# Patient Record
Sex: FEMALE | Race: WHITE | HISPANIC OR LATINO
[De-identification: names, ages, dates, MRNs, and addresses within clinical notes are randomized per-mention and may not be internally consistent; named-entity substitution may affect disease eponyms.]

---

## 2020-11-23 ENCOUNTER — APPOINTMENT (OUTPATIENT)
Dept: NEUROLOGY | Facility: CLINIC | Age: 75
End: 2020-11-23
Payer: MEDICARE

## 2020-11-23 VITALS
WEIGHT: 98 LBS | HEART RATE: 76 BPM | TEMPERATURE: 97.8 F | BODY MASS INDEX: 22.04 KG/M2 | SYSTOLIC BLOOD PRESSURE: 137 MMHG | DIASTOLIC BLOOD PRESSURE: 78 MMHG | HEIGHT: 56 IN

## 2020-11-23 DIAGNOSIS — Z86.79 PERSONAL HISTORY OF OTHER DISEASES OF THE CIRCULATORY SYSTEM: ICD-10-CM

## 2020-11-23 PROCEDURE — 99205 OFFICE O/P NEW HI 60 MIN: CPT

## 2020-11-24 PROBLEM — Z86.79 HISTORY OF ESSENTIAL HYPERTENSION: Status: RESOLVED | Noted: 2020-11-24 | Resolved: 2020-11-24

## 2020-11-24 NOTE — REVIEW OF SYSTEMS
[Confused or Disoriented] : confusion [Memory Lapses or Loss] : memory loss [Decr. Concentrating Ability] : decreased concentrating ability [Difficulty with Language] : ~M difficulty with language [Changed Thought Patterns] : changed thought patterns [Change In Personality] : personality change [Emotional Problems] : emotional problems [Negative] : Heme/Lymph

## 2020-11-24 NOTE — HISTORY OF PRESENT ILLNESS
[FreeTextEntry1] : DANISHA NAVARRETE is a 75 year old woman is being seen as a new patient for memory changes and two episode of unresponsiveness for the past 10 years. Patient is Latvian speaker and is accompanied to the clinic with her son. Per her son her symptoms include very poor short term memory, hallucination including both visual and auditory, restlessness and sometimes agitation. She has poor sleep and has hard time falling sleep. She lives with her niece and her son visits her frequently. In 2014 she was admitted to [facility] for one episode of altered mental status and stiffness. Her son reports that she was sitting on the table when suddenly stopped talking with on LOC. She was noted to have general stiffness.  Her Brain MRI back in 2014 showed microvascular changes but no acute infarct. Carotid Doppler showed bilateral <40 percent stenosis. Per her son VEEG was inconclusive but she was started on seizure medication but she stopped it after two months. She remain symptom free for few years until few years ago she had similar symptoms when she was in [facility]. She then stropped talking and her eyes remained closed for 3-4 hours. She was admitted to hospital and per report her work up was inconclusive.

## 2020-11-24 NOTE — PHYSICAL EXAM
[FreeTextEntry1] : Mental status: Awake, alert and oriented x1.  Poor Recent and remote memory intact.  Naming and repetition are intact. Poor Attention/concentration. No dysarthria, no aphasia. MOCA score is 11/20. She did poorly on visuospatial, naming, recalls, attention and calculation. \par Cranial nerves: Pupils equally round and reactive to light, visual fields full, no nystagmus, extraocular muscles intact, V1 through V3 intact bilaterally and symmetric, face symmetric, hearing intact to finger rub, palate elevation symmetric, tongue was midline.\par Motor:  MRC grading 5/5 b/l UE/LE.   strength 5/5 except the LLE( amputated) \par Sensation: Intact to light touch\par Coordination: No dysmetria on finger-to-nose\par Reflexes: 2+ in bilateral UE/LE. \par Gait:uses the wheelchair. \par \par

## 2020-11-24 NOTE — ASSESSMENT
[FreeTextEntry1] : DANISHA NAVARRETE is a 75 year old woman with history of hypertension, TIA, CVA and CAD is here to establish her care. She has two episodes of staring and unresponsiveness suspected for seizure but her work up been negative in 2014  and last year in [facility]. In addition she has symptoms of dementia including poor memory, insomnia, hallucinations and behavioral changes. Her MOCA score is 11/20.\par \par # AD/Dementia: \par - Start Aricept 5 mg \par - Seroquel 25 mg qhs ( after EKG) \par \par #TIA/CVA \par - Continue ASA and Lipitor  \par \par # History of unresponsiveness \par - Not on AED\par - Continue to monitor

## 2021-01-01 ENCOUNTER — TRANSCRIPTION ENCOUNTER (OUTPATIENT)
Age: 76
End: 2021-01-01

## 2021-01-01 ENCOUNTER — NON-APPOINTMENT (OUTPATIENT)
Age: 76
End: 2021-01-01

## 2021-01-01 ENCOUNTER — APPOINTMENT (OUTPATIENT)
Dept: NEUROLOGY | Facility: CLINIC | Age: 76
End: 2021-01-01
Payer: MEDICARE

## 2021-01-01 ENCOUNTER — APPOINTMENT (OUTPATIENT)
Dept: NEUROLOGY | Facility: CLINIC | Age: 76
End: 2021-01-01

## 2021-01-01 ENCOUNTER — INPATIENT (INPATIENT)
Facility: HOSPITAL | Age: 76
LOS: 3 days | Discharge: HOME | End: 2021-12-26
Attending: INTERNAL MEDICINE | Admitting: INTERNAL MEDICINE
Payer: MEDICARE

## 2021-01-01 ENCOUNTER — INPATIENT (INPATIENT)
Facility: HOSPITAL | Age: 76
LOS: 4 days | Discharge: HOME | End: 2021-06-30
Attending: INTERNAL MEDICINE | Admitting: INTERNAL MEDICINE
Payer: MEDICARE

## 2021-01-01 ENCOUNTER — INPATIENT (INPATIENT)
Facility: HOSPITAL | Age: 76
LOS: 8 days | Discharge: HOME | End: 2021-12-04
Attending: PSYCHIATRY & NEUROLOGY | Admitting: PSYCHIATRY & NEUROLOGY
Payer: MEDICARE

## 2021-01-01 VITALS
OXYGEN SATURATION: 99 % | SYSTOLIC BLOOD PRESSURE: 182 MMHG | DIASTOLIC BLOOD PRESSURE: 79 MMHG | HEART RATE: 55 BPM | RESPIRATION RATE: 18 BRPM | TEMPERATURE: 97 F

## 2021-01-01 VITALS
HEART RATE: 116 BPM | TEMPERATURE: 100 F | SYSTOLIC BLOOD PRESSURE: 107 MMHG | OXYGEN SATURATION: 99 % | RESPIRATION RATE: 18 BRPM | DIASTOLIC BLOOD PRESSURE: 58 MMHG | HEIGHT: 59 IN

## 2021-01-01 VITALS
DIASTOLIC BLOOD PRESSURE: 73 MMHG | HEIGHT: 59 IN | BODY MASS INDEX: 19.76 KG/M2 | WEIGHT: 98 LBS | OXYGEN SATURATION: 98 % | TEMPERATURE: 98.3 F | SYSTOLIC BLOOD PRESSURE: 130 MMHG | HEART RATE: 65 BPM

## 2021-01-01 VITALS
DIASTOLIC BLOOD PRESSURE: 64 MMHG | TEMPERATURE: 96 F | SYSTOLIC BLOOD PRESSURE: 133 MMHG | RESPIRATION RATE: 18 BRPM | HEART RATE: 86 BPM

## 2021-01-01 VITALS
RESPIRATION RATE: 18 BRPM | WEIGHT: 101.41 LBS | DIASTOLIC BLOOD PRESSURE: 94 MMHG | SYSTOLIC BLOOD PRESSURE: 168 MMHG | TEMPERATURE: 98 F

## 2021-01-01 VITALS
SYSTOLIC BLOOD PRESSURE: 132 MMHG | TEMPERATURE: 98 F | DIASTOLIC BLOOD PRESSURE: 70 MMHG | HEART RATE: 97 BPM | RESPIRATION RATE: 18 BRPM

## 2021-01-01 VITALS
TEMPERATURE: 98 F | DIASTOLIC BLOOD PRESSURE: 58 MMHG | RESPIRATION RATE: 18 BRPM | SYSTOLIC BLOOD PRESSURE: 147 MMHG | HEART RATE: 95 BPM

## 2021-01-01 DIAGNOSIS — Z89.612 ACQUIRED ABSENCE OF LEFT LEG ABOVE KNEE: Chronic | ICD-10-CM

## 2021-01-01 DIAGNOSIS — R13.10 DYSPHAGIA, UNSPECIFIED: ICD-10-CM

## 2021-01-01 DIAGNOSIS — G40.909 EPILEPSY, UNSPECIFIED, NOT INTRACTABLE, WITHOUT STATUS EPILEPTICUS: ICD-10-CM

## 2021-01-01 DIAGNOSIS — R74.8 ABNORMAL LEVELS OF OTHER SERUM ENZYMES: ICD-10-CM

## 2021-01-01 DIAGNOSIS — N39.0 URINARY TRACT INFECTION, SITE NOT SPECIFIED: ICD-10-CM

## 2021-01-01 DIAGNOSIS — Z79.2 LONG TERM (CURRENT) USE OF ANTIBIOTICS: ICD-10-CM

## 2021-01-01 DIAGNOSIS — R51.9 HEADACHE, UNSPECIFIED: ICD-10-CM

## 2021-01-01 DIAGNOSIS — R33.9 RETENTION OF URINE, UNSPECIFIED: ICD-10-CM

## 2021-01-01 DIAGNOSIS — J96.00 ACUTE RESPIRATORY FAILURE, UNSPECIFIED WHETHER WITH HYPOXIA OR HYPERCAPNIA: ICD-10-CM

## 2021-01-01 DIAGNOSIS — E53.8 DEFICIENCY OF OTHER SPECIFIED B GROUP VITAMINS: ICD-10-CM

## 2021-01-01 DIAGNOSIS — R41.82 ALTERED MENTAL STATUS, UNSPECIFIED: ICD-10-CM

## 2021-01-01 DIAGNOSIS — R41.9 UNSPECIFIED SYMPTOMS AND SIGNS INVOLVING COGNITIVE FUNCTIONS AND AWARENESS: ICD-10-CM

## 2021-01-01 DIAGNOSIS — F02.81 DEMENTIA IN OTHER DISEASES CLASSIFIED ELSEWHERE, UNSPECIFIED SEVERITY, WITH BEHAVIORAL DISTURBANCE: ICD-10-CM

## 2021-01-01 DIAGNOSIS — Z89.612 ACQUIRED ABSENCE OF LEFT LEG ABOVE KNEE: ICD-10-CM

## 2021-01-01 DIAGNOSIS — Z87.891 PERSONAL HISTORY OF NICOTINE DEPENDENCE: ICD-10-CM

## 2021-01-01 DIAGNOSIS — G93.41 METABOLIC ENCEPHALOPATHY: ICD-10-CM

## 2021-01-01 DIAGNOSIS — F03.90 UNSPECIFIED DEMENTIA WITHOUT BEHAVIORAL DISTURBANCE: ICD-10-CM

## 2021-01-01 DIAGNOSIS — Z89.522 ACQUIRED ABSENCE OF LEFT KNEE: ICD-10-CM

## 2021-01-01 DIAGNOSIS — I10 ESSENTIAL (PRIMARY) HYPERTENSION: ICD-10-CM

## 2021-01-01 DIAGNOSIS — Z90.5 ACQUIRED ABSENCE OF KIDNEY: Chronic | ICD-10-CM

## 2021-01-01 DIAGNOSIS — I69.392 FACIAL WEAKNESS FOLLOWING CEREBRAL INFARCTION: ICD-10-CM

## 2021-01-01 DIAGNOSIS — G81.94 HEMIPLEGIA, UNSPECIFIED AFFECTING LEFT NONDOMINANT SIDE: ICD-10-CM

## 2021-01-01 DIAGNOSIS — K59.00 CONSTIPATION, UNSPECIFIED: ICD-10-CM

## 2021-01-01 DIAGNOSIS — G30.9 ALZHEIMER'S DISEASE, UNSPECIFIED: ICD-10-CM

## 2021-01-01 DIAGNOSIS — Z91.14 PATIENT'S OTHER NONCOMPLIANCE WITH MEDICATION REGIMEN: ICD-10-CM

## 2021-01-01 DIAGNOSIS — R00.0 TACHYCARDIA, UNSPECIFIED: ICD-10-CM

## 2021-01-01 DIAGNOSIS — R29.734 NIHSS SCORE 34: ICD-10-CM

## 2021-01-01 DIAGNOSIS — E78.5 HYPERLIPIDEMIA, UNSPECIFIED: ICD-10-CM

## 2021-01-01 DIAGNOSIS — Z96.652 PRESENCE OF LEFT ARTIFICIAL KNEE JOINT: ICD-10-CM

## 2021-01-01 DIAGNOSIS — R41.0 DISORIENTATION, UNSPECIFIED: ICD-10-CM

## 2021-01-01 DIAGNOSIS — E83.52 HYPERCALCEMIA: ICD-10-CM

## 2021-01-01 DIAGNOSIS — Z90.5 ACQUIRED ABSENCE OF KIDNEY: ICD-10-CM

## 2021-01-01 DIAGNOSIS — Z86.73 PERSONAL HISTORY OF TRANSIENT ISCHEMIC ATTACK (TIA), AND CEREBRAL INFARCTION W/OUT RESIDUAL DEFICITS: ICD-10-CM

## 2021-01-01 DIAGNOSIS — R00.1 BRADYCARDIA, UNSPECIFIED: ICD-10-CM

## 2021-01-01 DIAGNOSIS — R29.6 REPEATED FALLS: ICD-10-CM

## 2021-01-01 LAB
25(OH)D3 SERPL-MCNC: 17 NG/ML
A1C WITH ESTIMATED AVERAGE GLUCOSE RESULT: 5.7 % — HIGH (ref 4–5.6)
ALBUMIN SERPL ELPH-MCNC: 3.2 G/DL — LOW (ref 3.5–5.2)
ALBUMIN SERPL ELPH-MCNC: 3.3 G/DL — LOW (ref 3.5–5.2)
ALBUMIN SERPL ELPH-MCNC: 3.3 G/DL — LOW (ref 3.5–5.2)
ALBUMIN SERPL ELPH-MCNC: 3.4 G/DL — LOW (ref 3.5–5.2)
ALBUMIN SERPL ELPH-MCNC: 3.5 G/DL — SIGNIFICANT CHANGE UP (ref 3.5–5.2)
ALBUMIN SERPL ELPH-MCNC: 3.5 G/DL — SIGNIFICANT CHANGE UP (ref 3.5–5.2)
ALBUMIN SERPL ELPH-MCNC: 3.7 G/DL — SIGNIFICANT CHANGE UP (ref 3.5–5.2)
ALBUMIN SERPL ELPH-MCNC: 3.8 G/DL — SIGNIFICANT CHANGE UP (ref 3.5–5.2)
ALBUMIN SERPL ELPH-MCNC: 3.9 G/DL — SIGNIFICANT CHANGE UP (ref 3.5–5.2)
ALBUMIN SERPL ELPH-MCNC: 4 G/DL — SIGNIFICANT CHANGE UP (ref 3.5–5.2)
ALBUMIN SERPL ELPH-MCNC: 4 G/DL — SIGNIFICANT CHANGE UP (ref 3.5–5.2)
ALBUMIN SERPL ELPH-MCNC: 4.1 G/DL — SIGNIFICANT CHANGE UP (ref 3.5–5.2)
ALBUMIN SERPL ELPH-MCNC: 4.4 G/DL
ALBUMIN SERPL ELPH-MCNC: 4.5 G/DL — SIGNIFICANT CHANGE UP (ref 3.5–5.2)
ALP BLD-CCNC: 174 U/L
ALP SERPL-CCNC: 104 U/L — SIGNIFICANT CHANGE UP (ref 30–115)
ALP SERPL-CCNC: 113 U/L — SIGNIFICANT CHANGE UP (ref 30–115)
ALP SERPL-CCNC: 119 U/L — HIGH (ref 30–115)
ALP SERPL-CCNC: 123 U/L — HIGH (ref 30–115)
ALP SERPL-CCNC: 124 U/L — HIGH (ref 30–115)
ALP SERPL-CCNC: 126 U/L — HIGH (ref 30–115)
ALP SERPL-CCNC: 134 U/L — HIGH (ref 30–115)
ALP SERPL-CCNC: 135 U/L — HIGH (ref 30–115)
ALP SERPL-CCNC: 150 U/L — HIGH (ref 30–115)
ALP SERPL-CCNC: 157 U/L — HIGH (ref 30–115)
ALP SERPL-CCNC: 161 U/L — HIGH (ref 30–115)
ALP SERPL-CCNC: 162 U/L — HIGH (ref 30–115)
ALP SERPL-CCNC: 169 U/L — HIGH (ref 30–115)
ALP SERPL-CCNC: 174 U/L — HIGH (ref 30–115)
ALP SERPL-CCNC: 183 U/L — HIGH (ref 30–115)
ALP SERPL-CCNC: 202 U/L — HIGH (ref 30–115)
ALP SERPL-CCNC: 205 U/L — HIGH (ref 30–115)
ALP SERPL-CCNC: 208 U/L — HIGH (ref 30–115)
ALP SERPL-CCNC: 97 U/L — SIGNIFICANT CHANGE UP (ref 30–115)
ALT FLD-CCNC: 10 U/L — SIGNIFICANT CHANGE UP (ref 0–41)
ALT FLD-CCNC: 11 U/L — SIGNIFICANT CHANGE UP (ref 0–41)
ALT FLD-CCNC: 11 U/L — SIGNIFICANT CHANGE UP (ref 0–41)
ALT FLD-CCNC: 14 U/L — SIGNIFICANT CHANGE UP (ref 0–41)
ALT FLD-CCNC: 14 U/L — SIGNIFICANT CHANGE UP (ref 0–41)
ALT FLD-CCNC: 187 U/L — HIGH (ref 0–41)
ALT FLD-CCNC: 19 U/L — SIGNIFICANT CHANGE UP (ref 0–41)
ALT FLD-CCNC: 20 U/L — SIGNIFICANT CHANGE UP (ref 0–41)
ALT FLD-CCNC: 229 U/L — HIGH (ref 0–41)
ALT FLD-CCNC: 27 U/L — SIGNIFICANT CHANGE UP (ref 0–41)
ALT FLD-CCNC: 34 U/L — SIGNIFICANT CHANGE UP (ref 0–41)
ALT FLD-CCNC: 45 U/L — HIGH (ref 0–41)
ALT FLD-CCNC: 6 U/L — SIGNIFICANT CHANGE UP (ref 0–41)
ALT FLD-CCNC: 64 U/L — HIGH (ref 0–41)
ALT FLD-CCNC: 8 U/L — SIGNIFICANT CHANGE UP (ref 0–41)
ALT FLD-CCNC: 9 U/L — SIGNIFICANT CHANGE UP (ref 0–41)
ALT FLD-CCNC: 9 U/L — SIGNIFICANT CHANGE UP (ref 0–41)
ALT SERPL-CCNC: 14 U/L
AMMONIA BLD-MCNC: 30 UMOL/L — SIGNIFICANT CHANGE UP (ref 11–55)
AMMONIA PLAS-MCNC: 18 UMOL/L
ANION GAP SERPL CALC-SCNC: 10 MMOL/L — SIGNIFICANT CHANGE UP (ref 7–14)
ANION GAP SERPL CALC-SCNC: 10 MMOL/L — SIGNIFICANT CHANGE UP (ref 7–14)
ANION GAP SERPL CALC-SCNC: 11 MMOL/L — SIGNIFICANT CHANGE UP (ref 7–14)
ANION GAP SERPL CALC-SCNC: 11 MMOL/L — SIGNIFICANT CHANGE UP (ref 7–14)
ANION GAP SERPL CALC-SCNC: 12 MMOL/L — SIGNIFICANT CHANGE UP (ref 7–14)
ANION GAP SERPL CALC-SCNC: 13 MMOL/L — SIGNIFICANT CHANGE UP (ref 7–14)
ANION GAP SERPL CALC-SCNC: 13 MMOL/L — SIGNIFICANT CHANGE UP (ref 7–14)
ANION GAP SERPL CALC-SCNC: 14 MMOL/L — SIGNIFICANT CHANGE UP (ref 7–14)
ANION GAP SERPL CALC-SCNC: 15 MMOL/L — HIGH (ref 7–14)
ANION GAP SERPL CALC-SCNC: 16 MMOL/L — HIGH (ref 7–14)
ANION GAP SERPL CALC-SCNC: 16 MMOL/L — HIGH (ref 7–14)
ANION GAP SERPL CALC-SCNC: 17 MMOL/L — HIGH (ref 7–14)
ANION GAP SERPL CALC-SCNC: 8 MMOL/L — SIGNIFICANT CHANGE UP (ref 7–14)
ANION GAP SERPL CALC-SCNC: 8 MMOL/L — SIGNIFICANT CHANGE UP (ref 7–14)
ANION GAP SERPL CALC-SCNC: 9 MMOL/L
APPEARANCE UR: ABNORMAL
APPEARANCE UR: ABNORMAL
APPEARANCE UR: CLEAR — SIGNIFICANT CHANGE UP
APTT BLD: 36.4 SEC — SIGNIFICANT CHANGE UP (ref 27–39.2)
APTT BLD: 54.4 SEC — HIGH (ref 27–39.2)
AST SERPL-CCNC: 10 U/L — SIGNIFICANT CHANGE UP (ref 0–41)
AST SERPL-CCNC: 11 U/L — SIGNIFICANT CHANGE UP (ref 0–41)
AST SERPL-CCNC: 14 U/L
AST SERPL-CCNC: 14 U/L — SIGNIFICANT CHANGE UP (ref 0–41)
AST SERPL-CCNC: 15 U/L — SIGNIFICANT CHANGE UP (ref 0–41)
AST SERPL-CCNC: 15 U/L — SIGNIFICANT CHANGE UP (ref 0–41)
AST SERPL-CCNC: 16 U/L — SIGNIFICANT CHANGE UP (ref 0–41)
AST SERPL-CCNC: 21 U/L — SIGNIFICANT CHANGE UP (ref 0–41)
AST SERPL-CCNC: 210 U/L — HIGH (ref 0–41)
AST SERPL-CCNC: 22 U/L — SIGNIFICANT CHANGE UP (ref 0–41)
AST SERPL-CCNC: 29 U/L — SIGNIFICANT CHANGE UP (ref 0–41)
AST SERPL-CCNC: 405 U/L — HIGH (ref 0–41)
BACTERIA # UR AUTO: ABNORMAL
BASE EXCESS BLDA CALC-SCNC: -3.8 MMOL/L — LOW (ref -2–3)
BASOPHILS # BLD AUTO: 0.02 K/UL — SIGNIFICANT CHANGE UP (ref 0–0.2)
BASOPHILS # BLD AUTO: 0.03 K/UL — SIGNIFICANT CHANGE UP (ref 0–0.2)
BASOPHILS # BLD AUTO: 0.04 K/UL — SIGNIFICANT CHANGE UP (ref 0–0.2)
BASOPHILS # BLD AUTO: 0.05 K/UL
BASOPHILS # BLD AUTO: 0.05 K/UL — SIGNIFICANT CHANGE UP (ref 0–0.2)
BASOPHILS # BLD AUTO: 0.05 K/UL — SIGNIFICANT CHANGE UP (ref 0–0.2)
BASOPHILS NFR BLD AUTO: 0.2 % — SIGNIFICANT CHANGE UP (ref 0–1)
BASOPHILS NFR BLD AUTO: 0.3 % — SIGNIFICANT CHANGE UP (ref 0–1)
BASOPHILS NFR BLD AUTO: 0.3 % — SIGNIFICANT CHANGE UP (ref 0–1)
BASOPHILS NFR BLD AUTO: 0.4 % — SIGNIFICANT CHANGE UP (ref 0–1)
BASOPHILS NFR BLD AUTO: 0.5 % — SIGNIFICANT CHANGE UP (ref 0–1)
BASOPHILS NFR BLD AUTO: 0.6 % — SIGNIFICANT CHANGE UP (ref 0–1)
BASOPHILS NFR BLD AUTO: 0.6 % — SIGNIFICANT CHANGE UP (ref 0–1)
BASOPHILS NFR BLD AUTO: 0.7 % — SIGNIFICANT CHANGE UP (ref 0–1)
BASOPHILS NFR BLD AUTO: 0.7 % — SIGNIFICANT CHANGE UP (ref 0–1)
BASOPHILS NFR BLD AUTO: 0.8 %
BILIRUB SERPL-MCNC: 0.2 MG/DL — SIGNIFICANT CHANGE UP (ref 0.2–1.2)
BILIRUB SERPL-MCNC: 0.3 MG/DL — SIGNIFICANT CHANGE UP (ref 0.2–1.2)
BILIRUB SERPL-MCNC: 0.4 MG/DL
BILIRUB SERPL-MCNC: 0.4 MG/DL — SIGNIFICANT CHANGE UP (ref 0.2–1.2)
BILIRUB SERPL-MCNC: 0.4 MG/DL — SIGNIFICANT CHANGE UP (ref 0.2–1.2)
BILIRUB SERPL-MCNC: 0.5 MG/DL — SIGNIFICANT CHANGE UP (ref 0.2–1.2)
BILIRUB SERPL-MCNC: 0.6 MG/DL — SIGNIFICANT CHANGE UP (ref 0.2–1.2)
BILIRUB SERPL-MCNC: 0.7 MG/DL — SIGNIFICANT CHANGE UP (ref 0.2–1.2)
BILIRUB SERPL-MCNC: 0.8 MG/DL — SIGNIFICANT CHANGE UP (ref 0.2–1.2)
BILIRUB SERPL-MCNC: 0.8 MG/DL — SIGNIFICANT CHANGE UP (ref 0.2–1.2)
BILIRUB UR-MCNC: NEGATIVE — SIGNIFICANT CHANGE UP
BUN SERPL-MCNC: 10 MG/DL — SIGNIFICANT CHANGE UP (ref 10–20)
BUN SERPL-MCNC: 11 MG/DL — SIGNIFICANT CHANGE UP (ref 10–20)
BUN SERPL-MCNC: 12 MG/DL — SIGNIFICANT CHANGE UP (ref 10–20)
BUN SERPL-MCNC: 13 MG/DL — SIGNIFICANT CHANGE UP (ref 10–20)
BUN SERPL-MCNC: 13 MG/DL — SIGNIFICANT CHANGE UP (ref 10–20)
BUN SERPL-MCNC: 14 MG/DL — SIGNIFICANT CHANGE UP (ref 10–20)
BUN SERPL-MCNC: 15 MG/DL — SIGNIFICANT CHANGE UP (ref 10–20)
BUN SERPL-MCNC: 16 MG/DL
BUN SERPL-MCNC: 16 MG/DL — SIGNIFICANT CHANGE UP (ref 10–20)
BUN SERPL-MCNC: 18 MG/DL — SIGNIFICANT CHANGE UP (ref 10–20)
BUN SERPL-MCNC: 20 MG/DL — SIGNIFICANT CHANGE UP (ref 10–20)
C DIFF BY PCR RESULT: POSITIVE
C DIFF TOX GENS STL QL NAA+PROBE: SIGNIFICANT CHANGE UP
CA-I BLD-SCNC: 1.3 MMOL/L — SIGNIFICANT CHANGE UP (ref 1.15–1.33)
CA-I BLD-SCNC: 1.4 MMOL/L — HIGH (ref 1.12–1.3)
CALCIUM SERPL-MCNC: 10 MG/DL — SIGNIFICANT CHANGE UP (ref 8.5–10.1)
CALCIUM SERPL-MCNC: 10.1 MG/DL — SIGNIFICANT CHANGE UP (ref 8.5–10.1)
CALCIUM SERPL-MCNC: 10.1 MG/DL — SIGNIFICANT CHANGE UP (ref 8.5–10.1)
CALCIUM SERPL-MCNC: 10.2 MG/DL — HIGH (ref 8.5–10.1)
CALCIUM SERPL-MCNC: 10.3 MG/DL — HIGH (ref 8.5–10.1)
CALCIUM SERPL-MCNC: 10.4 MG/DL — HIGH (ref 8.5–10.1)
CALCIUM SERPL-MCNC: 10.4 MG/DL — HIGH (ref 8.5–10.1)
CALCIUM SERPL-MCNC: 10.4 MG/DL — SIGNIFICANT CHANGE UP (ref 8.4–10.5)
CALCIUM SERPL-MCNC: 10.5 MG/DL — HIGH (ref 8.5–10.1)
CALCIUM SERPL-MCNC: 10.6 MG/DL — HIGH (ref 8.5–10.1)
CALCIUM SERPL-MCNC: 10.9 MG/DL
CALCIUM SERPL-MCNC: 9.2 MG/DL — SIGNIFICANT CHANGE UP (ref 8.5–10.1)
CALCIUM SERPL-MCNC: 9.3 MG/DL — SIGNIFICANT CHANGE UP (ref 8.5–10.1)
CALCIUM SERPL-MCNC: 9.3 MG/DL — SIGNIFICANT CHANGE UP (ref 8.5–10.1)
CALCIUM SERPL-MCNC: 9.4 MG/DL — SIGNIFICANT CHANGE UP (ref 8.5–10.1)
CALCIUM SERPL-MCNC: 9.5 MG/DL — SIGNIFICANT CHANGE UP (ref 8.5–10.1)
CALCIUM SERPL-MCNC: 9.5 MG/DL — SIGNIFICANT CHANGE UP (ref 8.5–10.1)
CALCIUM SERPL-MCNC: 9.6 MG/DL — SIGNIFICANT CHANGE UP (ref 8.5–10.1)
CALCIUM SERPL-MCNC: 9.7 MG/DL — SIGNIFICANT CHANGE UP (ref 8.5–10.1)
CALCIUM SERPL-MCNC: 9.7 MG/DL — SIGNIFICANT CHANGE UP (ref 8.5–10.1)
CALCIUM SERPL-MCNC: 9.8 MG/DL — SIGNIFICANT CHANGE UP (ref 8.5–10.1)
CALCIUM SERPL-MCNC: 9.8 MG/DL — SIGNIFICANT CHANGE UP (ref 8.5–10.1)
CALCIUM SERPL-MCNC: 9.9 MG/DL — SIGNIFICANT CHANGE UP (ref 8.5–10.1)
CHLORIDE SERPL-SCNC: 105 MMOL/L — SIGNIFICANT CHANGE UP (ref 98–110)
CHLORIDE SERPL-SCNC: 106 MMOL/L — SIGNIFICANT CHANGE UP (ref 98–110)
CHLORIDE SERPL-SCNC: 106 MMOL/L — SIGNIFICANT CHANGE UP (ref 98–110)
CHLORIDE SERPL-SCNC: 107 MMOL/L — SIGNIFICANT CHANGE UP (ref 98–110)
CHLORIDE SERPL-SCNC: 108 MMOL/L — SIGNIFICANT CHANGE UP (ref 98–110)
CHLORIDE SERPL-SCNC: 109 MMOL/L — SIGNIFICANT CHANGE UP (ref 98–110)
CHLORIDE SERPL-SCNC: 110 MMOL/L — SIGNIFICANT CHANGE UP (ref 98–110)
CHLORIDE SERPL-SCNC: 110 MMOL/L — SIGNIFICANT CHANGE UP (ref 98–110)
CHLORIDE SERPL-SCNC: 111 MMOL/L
CHLORIDE SERPL-SCNC: 111 MMOL/L — HIGH (ref 98–110)
CHLORIDE SERPL-SCNC: 112 MMOL/L — HIGH (ref 98–110)
CHLORIDE SERPL-SCNC: 112 MMOL/L — HIGH (ref 98–110)
CHLORIDE SERPL-SCNC: 113 MMOL/L — HIGH (ref 98–110)
CHLORIDE SERPL-SCNC: 113 MMOL/L — HIGH (ref 98–110)
CHOLEST SERPL-MCNC: 104 MG/DL — SIGNIFICANT CHANGE UP
CHOLEST SERPL-MCNC: 108 MG/DL
CHOLEST SERPL-MCNC: 111 MG/DL — SIGNIFICANT CHANGE UP
CO2 SERPL-SCNC: 13 MMOL/L — LOW (ref 17–32)
CO2 SERPL-SCNC: 15 MMOL/L — LOW (ref 17–32)
CO2 SERPL-SCNC: 16 MMOL/L — LOW (ref 17–32)
CO2 SERPL-SCNC: 17 MMOL/L — SIGNIFICANT CHANGE UP (ref 17–32)
CO2 SERPL-SCNC: 18 MMOL/L — SIGNIFICANT CHANGE UP (ref 17–32)
CO2 SERPL-SCNC: 18 MMOL/L — SIGNIFICANT CHANGE UP (ref 17–32)
CO2 SERPL-SCNC: 19 MMOL/L — SIGNIFICANT CHANGE UP (ref 17–32)
CO2 SERPL-SCNC: 20 MMOL/L — SIGNIFICANT CHANGE UP (ref 17–32)
CO2 SERPL-SCNC: 21 MMOL/L — SIGNIFICANT CHANGE UP (ref 17–32)
CO2 SERPL-SCNC: 22 MMOL/L — SIGNIFICANT CHANGE UP (ref 17–32)
CO2 SERPL-SCNC: 22 MMOL/L — SIGNIFICANT CHANGE UP (ref 17–32)
CO2 SERPL-SCNC: 23 MMOL/L — SIGNIFICANT CHANGE UP (ref 17–32)
CO2 SERPL-SCNC: 24 MMOL/L — SIGNIFICANT CHANGE UP (ref 17–32)
CO2 SERPL-SCNC: 25 MMOL/L — SIGNIFICANT CHANGE UP (ref 17–32)
CO2 SERPL-SCNC: 25 MMOL/L — SIGNIFICANT CHANGE UP (ref 17–32)
CO2 SERPL-SCNC: 26 MMOL/L
COLOR SPEC: YELLOW — SIGNIFICANT CHANGE UP
COVID-19 NUCLEOCAPSID GAM AB INTERP: NEGATIVE — SIGNIFICANT CHANGE UP
COVID-19 NUCLEOCAPSID TOTAL GAM ANTIBODY RESULT: 0.08 INDEX — SIGNIFICANT CHANGE UP
COVID-19 SPIKE DOMAIN AB INTERP: NEGATIVE — SIGNIFICANT CHANGE UP
COVID-19 SPIKE DOMAIN AB INTERP: POSITIVE
COVID-19 SPIKE DOMAIN ANTIBODY RESULT: 0.4 U/ML — SIGNIFICANT CHANGE UP
COVID-19 SPIKE DOMAIN ANTIBODY RESULT: >250 U/ML — HIGH
CREAT SERPL-MCNC: 0.7 MG/DL — SIGNIFICANT CHANGE UP (ref 0.7–1.5)
CREAT SERPL-MCNC: 0.8 MG/DL — SIGNIFICANT CHANGE UP (ref 0.7–1.5)
CREAT SERPL-MCNC: 0.9 MG/DL
CREAT SERPL-MCNC: 0.9 MG/DL — SIGNIFICANT CHANGE UP (ref 0.7–1.5)
CREAT SERPL-MCNC: 1.1 MG/DL — SIGNIFICANT CHANGE UP (ref 0.7–1.5)
CULTURE RESULTS: SIGNIFICANT CHANGE UP
DIFF PNL FLD: NEGATIVE — SIGNIFICANT CHANGE UP
DIFF PNL FLD: NEGATIVE — SIGNIFICANT CHANGE UP
DIFF PNL FLD: SIGNIFICANT CHANGE UP
EOSINOPHIL # BLD AUTO: 0 K/UL — SIGNIFICANT CHANGE UP (ref 0–0.7)
EOSINOPHIL # BLD AUTO: 0.01 K/UL — SIGNIFICANT CHANGE UP (ref 0–0.7)
EOSINOPHIL # BLD AUTO: 0.02 K/UL — SIGNIFICANT CHANGE UP (ref 0–0.7)
EOSINOPHIL # BLD AUTO: 0.02 K/UL — SIGNIFICANT CHANGE UP (ref 0–0.7)
EOSINOPHIL # BLD AUTO: 0.04 K/UL — SIGNIFICANT CHANGE UP (ref 0–0.7)
EOSINOPHIL # BLD AUTO: 0.05 K/UL — SIGNIFICANT CHANGE UP (ref 0–0.7)
EOSINOPHIL # BLD AUTO: 0.1 K/UL — SIGNIFICANT CHANGE UP (ref 0–0.7)
EOSINOPHIL # BLD AUTO: 0.13 K/UL — SIGNIFICANT CHANGE UP (ref 0–0.7)
EOSINOPHIL # BLD AUTO: 0.14 K/UL — SIGNIFICANT CHANGE UP (ref 0–0.7)
EOSINOPHIL # BLD AUTO: 0.15 K/UL — SIGNIFICANT CHANGE UP (ref 0–0.7)
EOSINOPHIL # BLD AUTO: 0.15 K/UL — SIGNIFICANT CHANGE UP (ref 0–0.7)
EOSINOPHIL # BLD AUTO: 0.16 K/UL — SIGNIFICANT CHANGE UP (ref 0–0.7)
EOSINOPHIL # BLD AUTO: 0.22 K/UL
EOSINOPHIL # BLD AUTO: 0.28 K/UL — SIGNIFICANT CHANGE UP (ref 0–0.7)
EOSINOPHIL # BLD AUTO: 0.38 K/UL — SIGNIFICANT CHANGE UP (ref 0–0.7)
EOSINOPHIL # BLD AUTO: 0.47 K/UL — SIGNIFICANT CHANGE UP (ref 0–0.7)
EOSINOPHIL # BLD AUTO: 0.5 K/UL — SIGNIFICANT CHANGE UP (ref 0–0.7)
EOSINOPHIL NFR BLD AUTO: 0 % — SIGNIFICANT CHANGE UP (ref 0–8)
EOSINOPHIL NFR BLD AUTO: 0.2 % — SIGNIFICANT CHANGE UP (ref 0–8)
EOSINOPHIL NFR BLD AUTO: 0.2 % — SIGNIFICANT CHANGE UP (ref 0–8)
EOSINOPHIL NFR BLD AUTO: 0.3 % — SIGNIFICANT CHANGE UP (ref 0–8)
EOSINOPHIL NFR BLD AUTO: 0.4 % — SIGNIFICANT CHANGE UP (ref 0–8)
EOSINOPHIL NFR BLD AUTO: 0.8 % — SIGNIFICANT CHANGE UP (ref 0–8)
EOSINOPHIL NFR BLD AUTO: 1.5 % — SIGNIFICANT CHANGE UP (ref 0–8)
EOSINOPHIL NFR BLD AUTO: 2 % — SIGNIFICANT CHANGE UP (ref 0–8)
EOSINOPHIL NFR BLD AUTO: 2.1 % — SIGNIFICANT CHANGE UP (ref 0–8)
EOSINOPHIL NFR BLD AUTO: 2.3 % — SIGNIFICANT CHANGE UP (ref 0–8)
EOSINOPHIL NFR BLD AUTO: 2.4 % — SIGNIFICANT CHANGE UP (ref 0–8)
EOSINOPHIL NFR BLD AUTO: 2.4 % — SIGNIFICANT CHANGE UP (ref 0–8)
EOSINOPHIL NFR BLD AUTO: 3.1 % — SIGNIFICANT CHANGE UP (ref 0–8)
EOSINOPHIL NFR BLD AUTO: 3.5 %
EOSINOPHIL NFR BLD AUTO: 4.4 % — SIGNIFICANT CHANGE UP (ref 0–8)
EOSINOPHIL NFR BLD AUTO: 6.5 % — SIGNIFICANT CHANGE UP (ref 0–8)
EOSINOPHIL NFR BLD AUTO: 6.7 % — SIGNIFICANT CHANGE UP (ref 0–8)
EPI CELLS # UR: 0 /HPF — SIGNIFICANT CHANGE UP (ref 0–5)
EPI CELLS # UR: 3 /HPF — SIGNIFICANT CHANGE UP (ref 0–5)
EPI CELLS # UR: 3 /HPF — SIGNIFICANT CHANGE UP (ref 0–5)
ESTIMATED AVERAGE GLUCOSE: 117 MG/DL — HIGH (ref 68–114)
FOLATE SERPL-MCNC: 17.7 NG/ML
FOLATE SERPL-MCNC: >20 NG/ML — SIGNIFICANT CHANGE UP
GAS PNL BLDA: SIGNIFICANT CHANGE UP
GGT SERPL-CCNC: 18 U/L — SIGNIFICANT CHANGE UP (ref 1–40)
GLUCOSE SERPL-MCNC: 100 MG/DL — HIGH (ref 70–99)
GLUCOSE SERPL-MCNC: 100 MG/DL — HIGH (ref 70–99)
GLUCOSE SERPL-MCNC: 104 MG/DL — HIGH (ref 70–99)
GLUCOSE SERPL-MCNC: 106 MG/DL — HIGH (ref 70–99)
GLUCOSE SERPL-MCNC: 106 MG/DL — HIGH (ref 70–99)
GLUCOSE SERPL-MCNC: 107 MG/DL — HIGH (ref 70–99)
GLUCOSE SERPL-MCNC: 107 MG/DL — HIGH (ref 70–99)
GLUCOSE SERPL-MCNC: 109 MG/DL — HIGH (ref 70–99)
GLUCOSE SERPL-MCNC: 112 MG/DL — HIGH (ref 70–99)
GLUCOSE SERPL-MCNC: 113 MG/DL — HIGH (ref 70–99)
GLUCOSE SERPL-MCNC: 122 MG/DL — HIGH (ref 70–99)
GLUCOSE SERPL-MCNC: 125 MG/DL — HIGH (ref 70–99)
GLUCOSE SERPL-MCNC: 129 MG/DL — HIGH (ref 70–99)
GLUCOSE SERPL-MCNC: 144 MG/DL — HIGH (ref 70–99)
GLUCOSE SERPL-MCNC: 171 MG/DL — HIGH (ref 70–99)
GLUCOSE SERPL-MCNC: 74 MG/DL — SIGNIFICANT CHANGE UP (ref 70–99)
GLUCOSE SERPL-MCNC: 82 MG/DL — SIGNIFICANT CHANGE UP (ref 70–99)
GLUCOSE SERPL-MCNC: 85 MG/DL
GLUCOSE SERPL-MCNC: 87 MG/DL — SIGNIFICANT CHANGE UP (ref 70–99)
GLUCOSE SERPL-MCNC: 89 MG/DL — SIGNIFICANT CHANGE UP (ref 70–99)
GLUCOSE SERPL-MCNC: 92 MG/DL — SIGNIFICANT CHANGE UP (ref 70–99)
GLUCOSE SERPL-MCNC: 93 MG/DL — SIGNIFICANT CHANGE UP (ref 70–99)
GLUCOSE UR QL: NEGATIVE — SIGNIFICANT CHANGE UP
HCO3 BLDA-SCNC: 21 MMOL/L — SIGNIFICANT CHANGE UP (ref 21–28)
HCT VFR BLD CALC: 31.8 % — LOW (ref 37–47)
HCT VFR BLD CALC: 34.8 % — LOW (ref 37–47)
HCT VFR BLD CALC: 35.8 % — LOW (ref 37–47)
HCT VFR BLD CALC: 36.7 % — LOW (ref 37–47)
HCT VFR BLD CALC: 36.9 % — LOW (ref 37–47)
HCT VFR BLD CALC: 37.1 % — SIGNIFICANT CHANGE UP (ref 37–47)
HCT VFR BLD CALC: 37.4 % — SIGNIFICANT CHANGE UP (ref 37–47)
HCT VFR BLD CALC: 38.3 % — SIGNIFICANT CHANGE UP (ref 37–47)
HCT VFR BLD CALC: 38.3 % — SIGNIFICANT CHANGE UP (ref 37–47)
HCT VFR BLD CALC: 38.6 % — SIGNIFICANT CHANGE UP (ref 37–47)
HCT VFR BLD CALC: 38.8 % — SIGNIFICANT CHANGE UP (ref 37–47)
HCT VFR BLD CALC: 40.2 %
HCT VFR BLD CALC: 40.2 % — SIGNIFICANT CHANGE UP (ref 37–47)
HCT VFR BLD CALC: 40.4 % — SIGNIFICANT CHANGE UP (ref 37–47)
HCT VFR BLD CALC: 40.6 % — SIGNIFICANT CHANGE UP (ref 37–47)
HCT VFR BLD CALC: 40.7 % — SIGNIFICANT CHANGE UP (ref 37–47)
HCT VFR BLD CALC: 42.1 % — SIGNIFICANT CHANGE UP (ref 37–47)
HCT VFR BLD CALC: 43 % — SIGNIFICANT CHANGE UP (ref 37–47)
HCT VFR BLD CALC: 43.1 % — SIGNIFICANT CHANGE UP (ref 37–47)
HDLC SERPL-MCNC: 43 MG/DL
HDLC SERPL-MCNC: 43 MG/DL — LOW
HDLC SERPL-MCNC: 47 MG/DL — LOW
HGB BLD-MCNC: 10.4 G/DL — LOW (ref 12–16)
HGB BLD-MCNC: 11.3 G/DL — LOW (ref 12–16)
HGB BLD-MCNC: 11.6 G/DL — LOW (ref 12–16)
HGB BLD-MCNC: 11.7 G/DL — LOW (ref 12–16)
HGB BLD-MCNC: 11.9 G/DL — LOW (ref 12–16)
HGB BLD-MCNC: 12 G/DL — SIGNIFICANT CHANGE UP (ref 12–16)
HGB BLD-MCNC: 12 G/DL — SIGNIFICANT CHANGE UP (ref 12–16)
HGB BLD-MCNC: 12.1 G/DL — SIGNIFICANT CHANGE UP (ref 12–16)
HGB BLD-MCNC: 12.4 G/DL — SIGNIFICANT CHANGE UP (ref 12–16)
HGB BLD-MCNC: 12.6 G/DL — SIGNIFICANT CHANGE UP (ref 12–16)
HGB BLD-MCNC: 12.6 G/DL — SIGNIFICANT CHANGE UP (ref 12–16)
HGB BLD-MCNC: 12.7 G/DL — SIGNIFICANT CHANGE UP (ref 12–16)
HGB BLD-MCNC: 12.7 G/DL — SIGNIFICANT CHANGE UP (ref 12–16)
HGB BLD-MCNC: 12.8 G/DL
HGB BLD-MCNC: 12.9 G/DL — SIGNIFICANT CHANGE UP (ref 12–16)
HGB BLD-MCNC: 13 G/DL — SIGNIFICANT CHANGE UP (ref 12–16)
HGB BLD-MCNC: 13.2 G/DL — SIGNIFICANT CHANGE UP (ref 12–16)
HGB BLD-MCNC: 13.3 G/DL — SIGNIFICANT CHANGE UP (ref 12–16)
HGB BLD-MCNC: 13.5 G/DL — SIGNIFICANT CHANGE UP (ref 12–16)
HGB BLD-MCNC: 13.6 G/DL — SIGNIFICANT CHANGE UP (ref 12–16)
HGB BLD-MCNC: 13.8 G/DL — SIGNIFICANT CHANGE UP (ref 12–16)
HOROWITZ INDEX BLDA+IHG-RTO: 30 — SIGNIFICANT CHANGE UP
HYALINE CASTS # UR AUTO: 1 /LPF — SIGNIFICANT CHANGE UP (ref 0–7)
HYALINE CASTS # UR AUTO: 1 /LPF — SIGNIFICANT CHANGE UP (ref 0–7)
HYALINE CASTS # UR AUTO: 3 /LPF — SIGNIFICANT CHANGE UP (ref 0–7)
IMM GRANULOCYTES NFR BLD AUTO: 0.1 % — SIGNIFICANT CHANGE UP (ref 0.1–0.3)
IMM GRANULOCYTES NFR BLD AUTO: 0.2 % — SIGNIFICANT CHANGE UP (ref 0.1–0.3)
IMM GRANULOCYTES NFR BLD AUTO: 0.3 %
IMM GRANULOCYTES NFR BLD AUTO: 0.3 % — SIGNIFICANT CHANGE UP (ref 0.1–0.3)
IMM GRANULOCYTES NFR BLD AUTO: 0.4 % — HIGH (ref 0.1–0.3)
IMM GRANULOCYTES NFR BLD AUTO: 0.4 % — HIGH (ref 0.1–0.3)
IMM GRANULOCYTES NFR BLD AUTO: 0.5 % — HIGH (ref 0.1–0.3)
IMM GRANULOCYTES NFR BLD AUTO: 0.5 % — HIGH (ref 0.1–0.3)
INR BLD: 1.05 RATIO — SIGNIFICANT CHANGE UP (ref 0.65–1.3)
INR BLD: 1.08 RATIO — SIGNIFICANT CHANGE UP (ref 0.65–1.3)
KETONES UR-MCNC: NEGATIVE — SIGNIFICANT CHANGE UP
LDLC SERPL CALC-MCNC: 46 MG/DL
LEUKOCYTE ESTERASE UR-ACNC: ABNORMAL
LIPID PNL WITH DIRECT LDL SERPL: 45 MG/DL — SIGNIFICANT CHANGE UP
LIPID PNL WITH DIRECT LDL SERPL: 47 MG/DL — SIGNIFICANT CHANGE UP
LYMPHOCYTES # BLD AUTO: 1.01 K/UL — LOW (ref 1.2–3.4)
LYMPHOCYTES # BLD AUTO: 1.23 K/UL — SIGNIFICANT CHANGE UP (ref 1.2–3.4)
LYMPHOCYTES # BLD AUTO: 1.31 K/UL — SIGNIFICANT CHANGE UP (ref 1.2–3.4)
LYMPHOCYTES # BLD AUTO: 1.37 K/UL — SIGNIFICANT CHANGE UP (ref 1.2–3.4)
LYMPHOCYTES # BLD AUTO: 1.43 K/UL — SIGNIFICANT CHANGE UP (ref 1.2–3.4)
LYMPHOCYTES # BLD AUTO: 1.46 K/UL — SIGNIFICANT CHANGE UP (ref 1.2–3.4)
LYMPHOCYTES # BLD AUTO: 1.5 K/UL — SIGNIFICANT CHANGE UP (ref 1.2–3.4)
LYMPHOCYTES # BLD AUTO: 1.55 K/UL — SIGNIFICANT CHANGE UP (ref 1.2–3.4)
LYMPHOCYTES # BLD AUTO: 1.57 K/UL — SIGNIFICANT CHANGE UP (ref 1.2–3.4)
LYMPHOCYTES # BLD AUTO: 1.62 K/UL — SIGNIFICANT CHANGE UP (ref 1.2–3.4)
LYMPHOCYTES # BLD AUTO: 1.68 K/UL — SIGNIFICANT CHANGE UP (ref 1.2–3.4)
LYMPHOCYTES # BLD AUTO: 1.69 K/UL
LYMPHOCYTES # BLD AUTO: 1.69 K/UL — SIGNIFICANT CHANGE UP (ref 1.2–3.4)
LYMPHOCYTES # BLD AUTO: 1.71 K/UL — SIGNIFICANT CHANGE UP (ref 1.2–3.4)
LYMPHOCYTES # BLD AUTO: 1.73 K/UL — SIGNIFICANT CHANGE UP (ref 1.2–3.4)
LYMPHOCYTES # BLD AUTO: 1.97 K/UL — SIGNIFICANT CHANGE UP (ref 1.2–3.4)
LYMPHOCYTES # BLD AUTO: 1.98 K/UL — SIGNIFICANT CHANGE UP (ref 1.2–3.4)
LYMPHOCYTES # BLD AUTO: 12 % — LOW (ref 20.5–51.1)
LYMPHOCYTES # BLD AUTO: 15.5 % — LOW (ref 20.5–51.1)
LYMPHOCYTES # BLD AUTO: 16.4 % — LOW (ref 20.5–51.1)
LYMPHOCYTES # BLD AUTO: 18.8 % — LOW (ref 20.5–51.1)
LYMPHOCYTES # BLD AUTO: 19.9 % — LOW (ref 20.5–51.1)
LYMPHOCYTES # BLD AUTO: 20 % — LOW (ref 20.5–51.1)
LYMPHOCYTES # BLD AUTO: 23.8 % — SIGNIFICANT CHANGE UP (ref 20.5–51.1)
LYMPHOCYTES # BLD AUTO: 24.8 % — SIGNIFICANT CHANGE UP (ref 20.5–51.1)
LYMPHOCYTES # BLD AUTO: 24.9 % — SIGNIFICANT CHANGE UP (ref 20.5–51.1)
LYMPHOCYTES # BLD AUTO: 25.4 % — SIGNIFICANT CHANGE UP (ref 20.5–51.1)
LYMPHOCYTES # BLD AUTO: 27.1 % — SIGNIFICANT CHANGE UP (ref 20.5–51.1)
LYMPHOCYTES # BLD AUTO: 27.5 % — SIGNIFICANT CHANGE UP (ref 20.5–51.1)
LYMPHOCYTES # BLD AUTO: 27.8 % — SIGNIFICANT CHANGE UP (ref 20.5–51.1)
LYMPHOCYTES # BLD AUTO: 31.3 % — SIGNIFICANT CHANGE UP (ref 20.5–51.1)
LYMPHOCYTES # BLD AUTO: 33.2 % — SIGNIFICANT CHANGE UP (ref 20.5–51.1)
LYMPHOCYTES # BLD AUTO: 8.5 % — LOW (ref 20.5–51.1)
LYMPHOCYTES NFR BLD AUTO: 27 %
MAGNESIUM SERPL-MCNC: 1.7 MG/DL — LOW (ref 1.8–2.4)
MAGNESIUM SERPL-MCNC: 1.8 MG/DL — SIGNIFICANT CHANGE UP (ref 1.8–2.4)
MAGNESIUM SERPL-MCNC: 1.9 MG/DL — SIGNIFICANT CHANGE UP (ref 1.8–2.4)
MAGNESIUM SERPL-MCNC: 2 MG/DL — SIGNIFICANT CHANGE UP (ref 1.8–2.4)
MAGNESIUM SERPL-MCNC: 2.1 MG/DL — SIGNIFICANT CHANGE UP (ref 1.8–2.4)
MAGNESIUM SERPL-MCNC: 2.1 MG/DL — SIGNIFICANT CHANGE UP (ref 1.8–2.4)
MAGNESIUM SERPL-MCNC: 2.2 MG/DL — SIGNIFICANT CHANGE UP (ref 1.8–2.4)
MAN DIFF?: NORMAL
MCHC RBC-ENTMCNC: 28.5 PG — SIGNIFICANT CHANGE UP (ref 27–31)
MCHC RBC-ENTMCNC: 28.6 PG — SIGNIFICANT CHANGE UP (ref 27–31)
MCHC RBC-ENTMCNC: 28.7 PG — SIGNIFICANT CHANGE UP (ref 27–31)
MCHC RBC-ENTMCNC: 28.8 PG — SIGNIFICANT CHANGE UP (ref 27–31)
MCHC RBC-ENTMCNC: 29 PG — SIGNIFICANT CHANGE UP (ref 27–31)
MCHC RBC-ENTMCNC: 29.1 PG — SIGNIFICANT CHANGE UP (ref 27–31)
MCHC RBC-ENTMCNC: 29.2 PG — SIGNIFICANT CHANGE UP (ref 27–31)
MCHC RBC-ENTMCNC: 29.3 PG — SIGNIFICANT CHANGE UP (ref 27–31)
MCHC RBC-ENTMCNC: 29.4 PG — SIGNIFICANT CHANGE UP (ref 27–31)
MCHC RBC-ENTMCNC: 29.6 PG
MCHC RBC-ENTMCNC: 29.6 PG — SIGNIFICANT CHANGE UP (ref 27–31)
MCHC RBC-ENTMCNC: 31.2 G/DL — LOW (ref 32–37)
MCHC RBC-ENTMCNC: 31.4 G/DL — LOW (ref 32–37)
MCHC RBC-ENTMCNC: 31.6 G/DL — LOW (ref 32–37)
MCHC RBC-ENTMCNC: 31.6 G/DL — LOW (ref 32–37)
MCHC RBC-ENTMCNC: 31.8 G/DL
MCHC RBC-ENTMCNC: 31.8 G/DL — LOW (ref 32–37)
MCHC RBC-ENTMCNC: 31.9 G/DL — LOW (ref 32–37)
MCHC RBC-ENTMCNC: 31.9 G/DL — LOW (ref 32–37)
MCHC RBC-ENTMCNC: 32 G/DL — SIGNIFICANT CHANGE UP (ref 32–37)
MCHC RBC-ENTMCNC: 32 G/DL — SIGNIFICANT CHANGE UP (ref 32–37)
MCHC RBC-ENTMCNC: 32.1 G/DL — SIGNIFICANT CHANGE UP (ref 32–37)
MCHC RBC-ENTMCNC: 32.1 G/DL — SIGNIFICANT CHANGE UP (ref 32–37)
MCHC RBC-ENTMCNC: 32.3 G/DL — SIGNIFICANT CHANGE UP (ref 32–37)
MCHC RBC-ENTMCNC: 32.4 G/DL — SIGNIFICANT CHANGE UP (ref 32–37)
MCHC RBC-ENTMCNC: 32.5 G/DL — SIGNIFICANT CHANGE UP (ref 32–37)
MCHC RBC-ENTMCNC: 32.6 G/DL — SIGNIFICANT CHANGE UP (ref 32–37)
MCHC RBC-ENTMCNC: 32.7 G/DL — SIGNIFICANT CHANGE UP (ref 32–37)
MCHC RBC-ENTMCNC: 32.9 G/DL — SIGNIFICANT CHANGE UP (ref 32–37)
MCHC RBC-ENTMCNC: 33.2 G/DL — SIGNIFICANT CHANGE UP (ref 32–37)
MCV RBC AUTO: 88.5 FL — SIGNIFICANT CHANGE UP (ref 81–99)
MCV RBC AUTO: 89 FL — SIGNIFICANT CHANGE UP (ref 81–99)
MCV RBC AUTO: 89.1 FL — SIGNIFICANT CHANGE UP (ref 81–99)
MCV RBC AUTO: 89.2 FL — SIGNIFICANT CHANGE UP (ref 81–99)
MCV RBC AUTO: 89.3 FL — SIGNIFICANT CHANGE UP (ref 81–99)
MCV RBC AUTO: 89.4 FL — SIGNIFICANT CHANGE UP (ref 81–99)
MCV RBC AUTO: 89.6 FL — SIGNIFICANT CHANGE UP (ref 81–99)
MCV RBC AUTO: 89.6 FL — SIGNIFICANT CHANGE UP (ref 81–99)
MCV RBC AUTO: 89.7 FL — SIGNIFICANT CHANGE UP (ref 81–99)
MCV RBC AUTO: 89.8 FL — SIGNIFICANT CHANGE UP (ref 81–99)
MCV RBC AUTO: 90.2 FL — SIGNIFICANT CHANGE UP (ref 81–99)
MCV RBC AUTO: 90.6 FL — SIGNIFICANT CHANGE UP (ref 81–99)
MCV RBC AUTO: 90.7 FL — SIGNIFICANT CHANGE UP (ref 81–99)
MCV RBC AUTO: 90.9 FL — SIGNIFICANT CHANGE UP (ref 81–99)
MCV RBC AUTO: 91.1 FL — SIGNIFICANT CHANGE UP (ref 81–99)
MCV RBC AUTO: 91.1 FL — SIGNIFICANT CHANGE UP (ref 81–99)
MCV RBC AUTO: 91.4 FL — SIGNIFICANT CHANGE UP (ref 81–99)
MCV RBC AUTO: 91.7 FL — SIGNIFICANT CHANGE UP (ref 81–99)
MCV RBC AUTO: 92.1 FL — SIGNIFICANT CHANGE UP (ref 81–99)
MCV RBC AUTO: 93.1 FL
MCV RBC AUTO: 93.1 FL — SIGNIFICANT CHANGE UP (ref 81–99)
MONOCYTES # BLD AUTO: 0.32 K/UL — SIGNIFICANT CHANGE UP (ref 0.1–0.6)
MONOCYTES # BLD AUTO: 0.46 K/UL — SIGNIFICANT CHANGE UP (ref 0.1–0.6)
MONOCYTES # BLD AUTO: 0.46 K/UL — SIGNIFICANT CHANGE UP (ref 0.1–0.6)
MONOCYTES # BLD AUTO: 0.48 K/UL — SIGNIFICANT CHANGE UP (ref 0.1–0.6)
MONOCYTES # BLD AUTO: 0.48 K/UL — SIGNIFICANT CHANGE UP (ref 0.1–0.6)
MONOCYTES # BLD AUTO: 0.56 K/UL — SIGNIFICANT CHANGE UP (ref 0.1–0.6)
MONOCYTES # BLD AUTO: 0.59 K/UL
MONOCYTES # BLD AUTO: 0.6 K/UL — SIGNIFICANT CHANGE UP (ref 0.1–0.6)
MONOCYTES # BLD AUTO: 0.62 K/UL — HIGH (ref 0.1–0.6)
MONOCYTES # BLD AUTO: 0.62 K/UL — HIGH (ref 0.1–0.6)
MONOCYTES # BLD AUTO: 0.65 K/UL — HIGH (ref 0.1–0.6)
MONOCYTES # BLD AUTO: 0.66 K/UL — HIGH (ref 0.1–0.6)
MONOCYTES # BLD AUTO: 0.77 K/UL — HIGH (ref 0.1–0.6)
MONOCYTES # BLD AUTO: 0.95 K/UL — HIGH (ref 0.1–0.6)
MONOCYTES # BLD AUTO: 1.02 K/UL — HIGH (ref 0.1–0.6)
MONOCYTES NFR BLD AUTO: 11.5 % — HIGH (ref 1.7–9.3)
MONOCYTES NFR BLD AUTO: 11.9 % — HIGH (ref 1.7–9.3)
MONOCYTES NFR BLD AUTO: 12.5 % — HIGH (ref 1.7–9.3)
MONOCYTES NFR BLD AUTO: 4.9 % — SIGNIFICANT CHANGE UP (ref 1.7–9.3)
MONOCYTES NFR BLD AUTO: 6.2 % — SIGNIFICANT CHANGE UP (ref 1.7–9.3)
MONOCYTES NFR BLD AUTO: 6.8 % — SIGNIFICANT CHANGE UP (ref 1.7–9.3)
MONOCYTES NFR BLD AUTO: 7 % — SIGNIFICANT CHANGE UP (ref 1.7–9.3)
MONOCYTES NFR BLD AUTO: 7.7 % — SIGNIFICANT CHANGE UP (ref 1.7–9.3)
MONOCYTES NFR BLD AUTO: 7.7 % — SIGNIFICANT CHANGE UP (ref 1.7–9.3)
MONOCYTES NFR BLD AUTO: 8 % — SIGNIFICANT CHANGE UP (ref 1.7–9.3)
MONOCYTES NFR BLD AUTO: 8.2 % — SIGNIFICANT CHANGE UP (ref 1.7–9.3)
MONOCYTES NFR BLD AUTO: 8.6 % — SIGNIFICANT CHANGE UP (ref 1.7–9.3)
MONOCYTES NFR BLD AUTO: 8.7 % — SIGNIFICANT CHANGE UP (ref 1.7–9.3)
MONOCYTES NFR BLD AUTO: 8.7 % — SIGNIFICANT CHANGE UP (ref 1.7–9.3)
MONOCYTES NFR BLD AUTO: 8.8 % — SIGNIFICANT CHANGE UP (ref 1.7–9.3)
MONOCYTES NFR BLD AUTO: 8.9 % — SIGNIFICANT CHANGE UP (ref 1.7–9.3)
MONOCYTES NFR BLD AUTO: 9.4 %
MRSA PCR RESULT.: NEGATIVE — SIGNIFICANT CHANGE UP
NEUTROPHILS # BLD AUTO: 12.11 K/UL — HIGH (ref 1.4–6.5)
NEUTROPHILS # BLD AUTO: 2.78 K/UL — SIGNIFICANT CHANGE UP (ref 1.4–6.5)
NEUTROPHILS # BLD AUTO: 3.23 K/UL — SIGNIFICANT CHANGE UP (ref 1.4–6.5)
NEUTROPHILS # BLD AUTO: 3.38 K/UL — SIGNIFICANT CHANGE UP (ref 1.4–6.5)
NEUTROPHILS # BLD AUTO: 3.44 K/UL — SIGNIFICANT CHANGE UP (ref 1.4–6.5)
NEUTROPHILS # BLD AUTO: 3.59 K/UL — SIGNIFICANT CHANGE UP (ref 1.4–6.5)
NEUTROPHILS # BLD AUTO: 3.68 K/UL
NEUTROPHILS # BLD AUTO: 3.87 K/UL — SIGNIFICANT CHANGE UP (ref 1.4–6.5)
NEUTROPHILS # BLD AUTO: 4.33 K/UL — SIGNIFICANT CHANGE UP (ref 1.4–6.5)
NEUTROPHILS # BLD AUTO: 4.46 K/UL — SIGNIFICANT CHANGE UP (ref 1.4–6.5)
NEUTROPHILS # BLD AUTO: 4.72 K/UL — SIGNIFICANT CHANGE UP (ref 1.4–6.5)
NEUTROPHILS # BLD AUTO: 4.79 K/UL — SIGNIFICANT CHANGE UP (ref 1.4–6.5)
NEUTROPHILS # BLD AUTO: 5.09 K/UL — SIGNIFICANT CHANGE UP (ref 1.4–6.5)
NEUTROPHILS # BLD AUTO: 5.12 K/UL — SIGNIFICANT CHANGE UP (ref 1.4–6.5)
NEUTROPHILS # BLD AUTO: 5.89 K/UL — SIGNIFICANT CHANGE UP (ref 1.4–6.5)
NEUTROPHILS # BLD AUTO: 6.31 K/UL — SIGNIFICANT CHANGE UP (ref 1.4–6.5)
NEUTROPHILS # BLD AUTO: 9.47 K/UL — HIGH (ref 1.4–6.5)
NEUTROPHILS NFR BLD AUTO: 53.3 % — SIGNIFICANT CHANGE UP (ref 42.2–75.2)
NEUTROPHILS NFR BLD AUTO: 56.7 % — SIGNIFICANT CHANGE UP (ref 42.2–75.2)
NEUTROPHILS NFR BLD AUTO: 59 %
NEUTROPHILS NFR BLD AUTO: 59.9 % — SIGNIFICANT CHANGE UP (ref 42.2–75.2)
NEUTROPHILS NFR BLD AUTO: 60.2 % — SIGNIFICANT CHANGE UP (ref 42.2–75.2)
NEUTROPHILS NFR BLD AUTO: 61.1 % — SIGNIFICANT CHANGE UP (ref 42.2–75.2)
NEUTROPHILS NFR BLD AUTO: 61.4 % — SIGNIFICANT CHANGE UP (ref 42.2–75.2)
NEUTROPHILS NFR BLD AUTO: 62.1 % — SIGNIFICANT CHANGE UP (ref 42.2–75.2)
NEUTROPHILS NFR BLD AUTO: 63.8 % — SIGNIFICANT CHANGE UP (ref 42.2–75.2)
NEUTROPHILS NFR BLD AUTO: 65.7 % — SIGNIFICANT CHANGE UP (ref 42.2–75.2)
NEUTROPHILS NFR BLD AUTO: 65.9 % — SIGNIFICANT CHANGE UP (ref 42.2–75.2)
NEUTROPHILS NFR BLD AUTO: 68.4 % — SIGNIFICANT CHANGE UP (ref 42.2–75.2)
NEUTROPHILS NFR BLD AUTO: 68.7 % — SIGNIFICANT CHANGE UP (ref 42.2–75.2)
NEUTROPHILS NFR BLD AUTO: 70.9 % — SIGNIFICANT CHANGE UP (ref 42.2–75.2)
NEUTROPHILS NFR BLD AUTO: 78.8 % — HIGH (ref 42.2–75.2)
NEUTROPHILS NFR BLD AUTO: 79.2 % — HIGH (ref 42.2–75.2)
NEUTROPHILS NFR BLD AUTO: 83.5 % — HIGH (ref 42.2–75.2)
NITRITE UR-MCNC: NEGATIVE — SIGNIFICANT CHANGE UP
NITRITE UR-MCNC: NEGATIVE — SIGNIFICANT CHANGE UP
NITRITE UR-MCNC: POSITIVE
NON HDL CHOLESTEROL: 61 MG/DL — SIGNIFICANT CHANGE UP
NON HDL CHOLESTEROL: 64 MG/DL — SIGNIFICANT CHANGE UP
NONHDLC SERPL-MCNC: 65 MG/DL
NRBC # BLD: 0 /100 WBCS — SIGNIFICANT CHANGE UP (ref 0–0)
PCO2 BLDA: 35 MMHG — SIGNIFICANT CHANGE UP (ref 32–35)
PH BLDA: 7.38 — SIGNIFICANT CHANGE UP (ref 7.35–7.45)
PH UR: 6.5 — SIGNIFICANT CHANGE UP (ref 5–8)
PH UR: 6.5 — SIGNIFICANT CHANGE UP (ref 5–8)
PH UR: 7 — SIGNIFICANT CHANGE UP (ref 5–8)
PHOSPHATE SERPL-MCNC: 1.7 MG/DL — LOW (ref 2.1–4.9)
PHOSPHATE SERPL-MCNC: 2.4 MG/DL — SIGNIFICANT CHANGE UP (ref 2.1–4.9)
PHOSPHATE SERPL-MCNC: 2.5 MG/DL — SIGNIFICANT CHANGE UP (ref 2.1–4.9)
PHOSPHATE SERPL-MCNC: 2.5 MG/DL — SIGNIFICANT CHANGE UP (ref 2.1–4.9)
PHOSPHATE SERPL-MCNC: 2.6 MG/DL — SIGNIFICANT CHANGE UP (ref 2.1–4.9)
PHOSPHATE SERPL-MCNC: 2.8 MG/DL — SIGNIFICANT CHANGE UP (ref 2.1–4.9)
PHOSPHATE SERPL-MCNC: 3.1 MG/DL — SIGNIFICANT CHANGE UP (ref 2.1–4.9)
PLATELET # BLD AUTO: 129 K/UL — LOW (ref 130–400)
PLATELET # BLD AUTO: 129 K/UL — LOW (ref 130–400)
PLATELET # BLD AUTO: 144 K/UL — SIGNIFICANT CHANGE UP (ref 130–400)
PLATELET # BLD AUTO: 169 K/UL — SIGNIFICANT CHANGE UP (ref 130–400)
PLATELET # BLD AUTO: 184 K/UL — SIGNIFICANT CHANGE UP (ref 130–400)
PLATELET # BLD AUTO: 190 K/UL — SIGNIFICANT CHANGE UP (ref 130–400)
PLATELET # BLD AUTO: 191 K/UL — SIGNIFICANT CHANGE UP (ref 130–400)
PLATELET # BLD AUTO: 197 K/UL — SIGNIFICANT CHANGE UP (ref 130–400)
PLATELET # BLD AUTO: 198 K/UL — SIGNIFICANT CHANGE UP (ref 130–400)
PLATELET # BLD AUTO: 202 K/UL — SIGNIFICANT CHANGE UP (ref 130–400)
PLATELET # BLD AUTO: 206 K/UL — SIGNIFICANT CHANGE UP (ref 130–400)
PLATELET # BLD AUTO: 206 K/UL — SIGNIFICANT CHANGE UP (ref 130–400)
PLATELET # BLD AUTO: 210 K/UL — SIGNIFICANT CHANGE UP (ref 130–400)
PLATELET # BLD AUTO: 211 K/UL — SIGNIFICANT CHANGE UP (ref 130–400)
PLATELET # BLD AUTO: 222 K/UL — SIGNIFICANT CHANGE UP (ref 130–400)
PLATELET # BLD AUTO: 225 K/UL — SIGNIFICANT CHANGE UP (ref 130–400)
PLATELET # BLD AUTO: 230 K/UL
PLATELET # BLD AUTO: 231 K/UL — SIGNIFICANT CHANGE UP (ref 130–400)
PLATELET # BLD AUTO: 234 K/UL — SIGNIFICANT CHANGE UP (ref 130–400)
PLATELET # BLD AUTO: 279 K/UL — SIGNIFICANT CHANGE UP (ref 130–400)
PLATELET # BLD AUTO: 95 K/UL — LOW (ref 130–400)
PO2 BLDA: 162 MMHG — HIGH (ref 83–108)
POTASSIUM SERPL-MCNC: 3.1 MMOL/L — LOW (ref 3.5–5)
POTASSIUM SERPL-MCNC: 3.3 MMOL/L — LOW (ref 3.5–5)
POTASSIUM SERPL-MCNC: 3.5 MMOL/L — SIGNIFICANT CHANGE UP (ref 3.5–5)
POTASSIUM SERPL-MCNC: 3.7 MMOL/L — SIGNIFICANT CHANGE UP (ref 3.5–5)
POTASSIUM SERPL-MCNC: 3.9 MMOL/L — SIGNIFICANT CHANGE UP (ref 3.5–5)
POTASSIUM SERPL-MCNC: 4 MMOL/L — SIGNIFICANT CHANGE UP (ref 3.5–5)
POTASSIUM SERPL-MCNC: 4.1 MMOL/L — SIGNIFICANT CHANGE UP (ref 3.5–5)
POTASSIUM SERPL-MCNC: 4.1 MMOL/L — SIGNIFICANT CHANGE UP (ref 3.5–5)
POTASSIUM SERPL-MCNC: 4.2 MMOL/L — SIGNIFICANT CHANGE UP (ref 3.5–5)
POTASSIUM SERPL-MCNC: 4.5 MMOL/L — SIGNIFICANT CHANGE UP (ref 3.5–5)
POTASSIUM SERPL-MCNC: 4.9 MMOL/L — SIGNIFICANT CHANGE UP (ref 3.5–5)
POTASSIUM SERPL-SCNC: 3.1 MMOL/L — LOW (ref 3.5–5)
POTASSIUM SERPL-SCNC: 3.3 MMOL/L — LOW (ref 3.5–5)
POTASSIUM SERPL-SCNC: 3.5 MMOL/L — SIGNIFICANT CHANGE UP (ref 3.5–5)
POTASSIUM SERPL-SCNC: 3.7 MMOL/L — SIGNIFICANT CHANGE UP (ref 3.5–5)
POTASSIUM SERPL-SCNC: 3.9 MMOL/L — SIGNIFICANT CHANGE UP (ref 3.5–5)
POTASSIUM SERPL-SCNC: 4 MMOL/L — SIGNIFICANT CHANGE UP (ref 3.5–5)
POTASSIUM SERPL-SCNC: 4.1 MMOL/L — SIGNIFICANT CHANGE UP (ref 3.5–5)
POTASSIUM SERPL-SCNC: 4.1 MMOL/L — SIGNIFICANT CHANGE UP (ref 3.5–5)
POTASSIUM SERPL-SCNC: 4.2 MMOL/L — SIGNIFICANT CHANGE UP (ref 3.5–5)
POTASSIUM SERPL-SCNC: 4.4 MMOL/L
POTASSIUM SERPL-SCNC: 4.5 MMOL/L — SIGNIFICANT CHANGE UP (ref 3.5–5)
POTASSIUM SERPL-SCNC: 4.9 MMOL/L — SIGNIFICANT CHANGE UP (ref 3.5–5)
PROT SERPL-MCNC: 5.8 G/DL — LOW (ref 6–8)
PROT SERPL-MCNC: 5.8 G/DL — LOW (ref 6–8)
PROT SERPL-MCNC: 6 G/DL — SIGNIFICANT CHANGE UP (ref 6–8)
PROT SERPL-MCNC: 6.2 G/DL — SIGNIFICANT CHANGE UP (ref 6–8)
PROT SERPL-MCNC: 6.3 G/DL — SIGNIFICANT CHANGE UP (ref 6–8)
PROT SERPL-MCNC: 6.4 G/DL — SIGNIFICANT CHANGE UP (ref 6–8)
PROT SERPL-MCNC: 6.4 G/DL — SIGNIFICANT CHANGE UP (ref 6–8)
PROT SERPL-MCNC: 6.6 G/DL — SIGNIFICANT CHANGE UP (ref 6–8)
PROT SERPL-MCNC: 6.7 G/DL — SIGNIFICANT CHANGE UP (ref 6–8)
PROT SERPL-MCNC: 6.7 G/DL — SIGNIFICANT CHANGE UP (ref 6–8)
PROT SERPL-MCNC: 6.8 G/DL — SIGNIFICANT CHANGE UP (ref 6–8)
PROT SERPL-MCNC: 6.9 G/DL — SIGNIFICANT CHANGE UP (ref 6–8)
PROT SERPL-MCNC: 7.6 G/DL
PROT SERPL-MCNC: 7.7 G/DL — SIGNIFICANT CHANGE UP (ref 6–8)
PROT UR-MCNC: SIGNIFICANT CHANGE UP
PROTHROM AB SERPL-ACNC: 12.1 SEC — SIGNIFICANT CHANGE UP (ref 9.95–12.87)
PROTHROM AB SERPL-ACNC: 12.4 SEC — SIGNIFICANT CHANGE UP (ref 9.95–12.87)
PTH-INTACT FLD-MCNC: 84 PG/ML — HIGH (ref 15–65)
RBC # BLD: 3.51 M/UL — LOW (ref 4.2–5.4)
RBC # BLD: 3.9 M/UL — LOW (ref 4.2–5.4)
RBC # BLD: 3.97 M/UL — LOW (ref 4.2–5.4)
RBC # BLD: 4.03 M/UL — LOW (ref 4.2–5.4)
RBC # BLD: 4.08 M/UL — LOW (ref 4.2–5.4)
RBC # BLD: 4.11 M/UL — LOW (ref 4.2–5.4)
RBC # BLD: 4.16 M/UL — LOW (ref 4.2–5.4)
RBC # BLD: 4.17 M/UL — LOW (ref 4.2–5.4)
RBC # BLD: 4.32 M/UL
RBC # BLD: 4.33 M/UL — SIGNIFICANT CHANGE UP (ref 4.2–5.4)
RBC # BLD: 4.34 M/UL — SIGNIFICANT CHANGE UP (ref 4.2–5.4)
RBC # BLD: 4.42 M/UL — SIGNIFICANT CHANGE UP (ref 4.2–5.4)
RBC # BLD: 4.48 M/UL — SIGNIFICANT CHANGE UP (ref 4.2–5.4)
RBC # BLD: 4.52 M/UL — SIGNIFICANT CHANGE UP (ref 4.2–5.4)
RBC # BLD: 4.53 M/UL — SIGNIFICANT CHANGE UP (ref 4.2–5.4)
RBC # BLD: 4.56 M/UL — SIGNIFICANT CHANGE UP (ref 4.2–5.4)
RBC # BLD: 4.64 M/UL — SIGNIFICANT CHANGE UP (ref 4.2–5.4)
RBC # BLD: 4.73 M/UL — SIGNIFICANT CHANGE UP (ref 4.2–5.4)
RBC # BLD: 4.73 M/UL — SIGNIFICANT CHANGE UP (ref 4.2–5.4)
RBC # FLD: 13.3 % — SIGNIFICANT CHANGE UP (ref 11.5–14.5)
RBC # FLD: 13.4 % — SIGNIFICANT CHANGE UP (ref 11.5–14.5)
RBC # FLD: 13.5 %
RBC # FLD: 13.5 % — SIGNIFICANT CHANGE UP (ref 11.5–14.5)
RBC # FLD: 13.5 % — SIGNIFICANT CHANGE UP (ref 11.5–14.5)
RBC # FLD: 13.6 % — SIGNIFICANT CHANGE UP (ref 11.5–14.5)
RBC # FLD: 13.7 % — SIGNIFICANT CHANGE UP (ref 11.5–14.5)
RBC # FLD: 13.8 % — SIGNIFICANT CHANGE UP (ref 11.5–14.5)
RBC # FLD: 13.8 % — SIGNIFICANT CHANGE UP (ref 11.5–14.5)
RBC # FLD: 13.9 % — SIGNIFICANT CHANGE UP (ref 11.5–14.5)
RBC # FLD: 14 % — SIGNIFICANT CHANGE UP (ref 11.5–14.5)
RBC # FLD: 14 % — SIGNIFICANT CHANGE UP (ref 11.5–14.5)
RBC # FLD: 14.2 % — SIGNIFICANT CHANGE UP (ref 11.5–14.5)
RBC # FLD: 14.2 % — SIGNIFICANT CHANGE UP (ref 11.5–14.5)
RBC # FLD: 14.3 % — SIGNIFICANT CHANGE UP (ref 11.5–14.5)
RBC # FLD: 14.3 % — SIGNIFICANT CHANGE UP (ref 11.5–14.5)
RBC # FLD: 14.7 % — HIGH (ref 11.5–14.5)
RBC CASTS # UR COMP ASSIST: 0 /HPF — SIGNIFICANT CHANGE UP (ref 0–4)
RBC CASTS # UR COMP ASSIST: 1 /HPF — SIGNIFICANT CHANGE UP (ref 0–4)
RBC CASTS # UR COMP ASSIST: 1 /HPF — SIGNIFICANT CHANGE UP (ref 0–4)
SAO2 % BLDA: 100 % — HIGH (ref 94–98)
SARS-COV-2 IGG+IGM SERPL QL IA: 0.08 INDEX — SIGNIFICANT CHANGE UP
SARS-COV-2 IGG+IGM SERPL QL IA: 0.4 U/ML — SIGNIFICANT CHANGE UP
SARS-COV-2 IGG+IGM SERPL QL IA: >250 U/ML — HIGH
SARS-COV-2 IGG+IGM SERPL QL IA: NEGATIVE — SIGNIFICANT CHANGE UP
SARS-COV-2 IGG+IGM SERPL QL IA: NEGATIVE — SIGNIFICANT CHANGE UP
SARS-COV-2 IGG+IGM SERPL QL IA: POSITIVE
SARS-COV-2 RNA SPEC QL NAA+PROBE: SIGNIFICANT CHANGE UP
SODIUM SERPL-SCNC: 139 MMOL/L — SIGNIFICANT CHANGE UP (ref 135–146)
SODIUM SERPL-SCNC: 140 MMOL/L — SIGNIFICANT CHANGE UP (ref 135–146)
SODIUM SERPL-SCNC: 140 MMOL/L — SIGNIFICANT CHANGE UP (ref 135–146)
SODIUM SERPL-SCNC: 141 MMOL/L — SIGNIFICANT CHANGE UP (ref 135–146)
SODIUM SERPL-SCNC: 142 MMOL/L — SIGNIFICANT CHANGE UP (ref 135–146)
SODIUM SERPL-SCNC: 143 MMOL/L — SIGNIFICANT CHANGE UP (ref 135–146)
SODIUM SERPL-SCNC: 144 MMOL/L — SIGNIFICANT CHANGE UP (ref 135–146)
SODIUM SERPL-SCNC: 145 MMOL/L — SIGNIFICANT CHANGE UP (ref 135–146)
SODIUM SERPL-SCNC: 145 MMOL/L — SIGNIFICANT CHANGE UP (ref 135–146)
SODIUM SERPL-SCNC: 146 MMOL/L
SP GR SPEC: 1.01 — SIGNIFICANT CHANGE UP (ref 1.01–1.03)
SPECIMEN SOURCE: SIGNIFICANT CHANGE UP
T PALLIDUM AB TITR SER: NEGATIVE — SIGNIFICANT CHANGE UP
T4 AB SER-ACNC: 8.5 UG/DL — SIGNIFICANT CHANGE UP (ref 4.6–12)
TRIGL SERPL-MCNC: 64 MG/DL — SIGNIFICANT CHANGE UP
TRIGL SERPL-MCNC: 92 MG/DL — SIGNIFICANT CHANGE UP
TRIGL SERPL-MCNC: 98 MG/DL
TROPONIN T SERPL-MCNC: <0.01 NG/ML — SIGNIFICANT CHANGE UP
TSH SERPL-MCNC: 0.92 UIU/ML — SIGNIFICANT CHANGE UP (ref 0.27–4.2)
TSH SERPL-MCNC: 1.52 UIU/ML — SIGNIFICANT CHANGE UP (ref 0.27–4.2)
UROBILINOGEN FLD QL: ABNORMAL
UROBILINOGEN FLD QL: SIGNIFICANT CHANGE UP
UROBILINOGEN FLD QL: SIGNIFICANT CHANGE UP
VALPROATE SERPL-MCNC: 76 UG/ML — SIGNIFICANT CHANGE UP (ref 50–100)
VIT B12 SERPL-MCNC: 802 PG/ML
VIT B12 SERPL-MCNC: 810 PG/ML — SIGNIFICANT CHANGE UP (ref 232–1245)
WBC # BLD: 11.1 K/UL — HIGH (ref 4.8–10.8)
WBC # BLD: 11.94 K/UL — HIGH (ref 4.8–10.8)
WBC # BLD: 12.13 K/UL — HIGH (ref 4.8–10.8)
WBC # BLD: 13.92 K/UL — HIGH (ref 4.8–10.8)
WBC # BLD: 14.51 K/UL — HIGH (ref 4.8–10.8)
WBC # BLD: 5.08 K/UL — SIGNIFICANT CHANGE UP (ref 4.8–10.8)
WBC # BLD: 5.21 K/UL — SIGNIFICANT CHANGE UP (ref 4.8–10.8)
WBC # BLD: 5.26 K/UL — SIGNIFICANT CHANGE UP (ref 4.8–10.8)
WBC # BLD: 5.63 K/UL — SIGNIFICANT CHANGE UP (ref 4.8–10.8)
WBC # BLD: 5.64 K/UL — SIGNIFICANT CHANGE UP (ref 4.8–10.8)
WBC # BLD: 6.23 K/UL — SIGNIFICANT CHANGE UP (ref 4.8–10.8)
WBC # BLD: 6.32 K/UL — SIGNIFICANT CHANGE UP (ref 4.8–10.8)
WBC # BLD: 6.5 K/UL — SIGNIFICANT CHANGE UP (ref 4.8–10.8)
WBC # BLD: 6.77 K/UL — SIGNIFICANT CHANGE UP (ref 4.8–10.8)
WBC # BLD: 6.88 K/UL — SIGNIFICANT CHANGE UP (ref 4.8–10.8)
WBC # BLD: 7.19 K/UL — SIGNIFICANT CHANGE UP (ref 4.8–10.8)
WBC # BLD: 7.5 K/UL — SIGNIFICANT CHANGE UP (ref 4.8–10.8)
WBC # BLD: 7.75 K/UL — SIGNIFICANT CHANGE UP (ref 4.8–10.8)
WBC # BLD: 8.61 K/UL — SIGNIFICANT CHANGE UP (ref 4.8–10.8)
WBC # BLD: 8.9 K/UL — SIGNIFICANT CHANGE UP (ref 4.8–10.8)
WBC # FLD AUTO: 11.1 K/UL — HIGH (ref 4.8–10.8)
WBC # FLD AUTO: 11.94 K/UL — HIGH (ref 4.8–10.8)
WBC # FLD AUTO: 12.13 K/UL — HIGH (ref 4.8–10.8)
WBC # FLD AUTO: 13.92 K/UL — HIGH (ref 4.8–10.8)
WBC # FLD AUTO: 14.51 K/UL — HIGH (ref 4.8–10.8)
WBC # FLD AUTO: 5.08 K/UL — SIGNIFICANT CHANGE UP (ref 4.8–10.8)
WBC # FLD AUTO: 5.21 K/UL — SIGNIFICANT CHANGE UP (ref 4.8–10.8)
WBC # FLD AUTO: 5.26 K/UL — SIGNIFICANT CHANGE UP (ref 4.8–10.8)
WBC # FLD AUTO: 5.63 K/UL — SIGNIFICANT CHANGE UP (ref 4.8–10.8)
WBC # FLD AUTO: 5.64 K/UL — SIGNIFICANT CHANGE UP (ref 4.8–10.8)
WBC # FLD AUTO: 6.23 K/UL — SIGNIFICANT CHANGE UP (ref 4.8–10.8)
WBC # FLD AUTO: 6.25 K/UL
WBC # FLD AUTO: 6.32 K/UL — SIGNIFICANT CHANGE UP (ref 4.8–10.8)
WBC # FLD AUTO: 6.5 K/UL — SIGNIFICANT CHANGE UP (ref 4.8–10.8)
WBC # FLD AUTO: 6.77 K/UL — SIGNIFICANT CHANGE UP (ref 4.8–10.8)
WBC # FLD AUTO: 6.88 K/UL — SIGNIFICANT CHANGE UP (ref 4.8–10.8)
WBC # FLD AUTO: 7.19 K/UL — SIGNIFICANT CHANGE UP (ref 4.8–10.8)
WBC # FLD AUTO: 7.5 K/UL — SIGNIFICANT CHANGE UP (ref 4.8–10.8)
WBC # FLD AUTO: 7.75 K/UL — SIGNIFICANT CHANGE UP (ref 4.8–10.8)
WBC # FLD AUTO: 8.61 K/UL — SIGNIFICANT CHANGE UP (ref 4.8–10.8)
WBC # FLD AUTO: 8.9 K/UL — SIGNIFICANT CHANGE UP (ref 4.8–10.8)
WBC UR QL: 18 /HPF — HIGH (ref 0–5)
WBC UR QL: 224 /HPF — HIGH (ref 0–5)
WBC UR QL: 23 /HPF — HIGH (ref 0–5)

## 2021-01-01 PROCEDURE — 99291 CRITICAL CARE FIRST HOUR: CPT

## 2021-01-01 PROCEDURE — 71045 X-RAY EXAM CHEST 1 VIEW: CPT | Mod: 26

## 2021-01-01 PROCEDURE — 95720 EEG PHY/QHP EA INCR W/VEEG: CPT

## 2021-01-01 PROCEDURE — 99231 SBSQ HOSP IP/OBS SF/LOW 25: CPT

## 2021-01-01 PROCEDURE — 36620 INSERTION CATHETER ARTERY: CPT

## 2021-01-01 PROCEDURE — 72170 X-RAY EXAM OF PELVIS: CPT | Mod: 26

## 2021-01-01 PROCEDURE — 99233 SBSQ HOSP IP/OBS HIGH 50: CPT

## 2021-01-01 PROCEDURE — 70450 CT HEAD/BRAIN W/O DYE: CPT | Mod: 26

## 2021-01-01 PROCEDURE — 99232 SBSQ HOSP IP/OBS MODERATE 35: CPT

## 2021-01-01 PROCEDURE — 99221 1ST HOSP IP/OBS SF/LOW 40: CPT

## 2021-01-01 PROCEDURE — 73551 X-RAY EXAM OF FEMUR 1: CPT | Mod: 26,LT

## 2021-01-01 PROCEDURE — 99291 CRITICAL CARE FIRST HOUR: CPT | Mod: 25

## 2021-01-01 PROCEDURE — 99239 HOSP IP/OBS DSCHRG MGMT >30: CPT

## 2021-01-01 PROCEDURE — 70553 MRI BRAIN STEM W/O & W/DYE: CPT | Mod: 26

## 2021-01-01 PROCEDURE — 73562 X-RAY EXAM OF KNEE 3: CPT | Mod: 26,RT

## 2021-01-01 PROCEDURE — 93306 TTE W/DOPPLER COMPLETE: CPT | Mod: 26

## 2021-01-01 PROCEDURE — 70450 CT HEAD/BRAIN W/O DYE: CPT | Mod: 26,MA

## 2021-01-01 PROCEDURE — 99223 1ST HOSP IP/OBS HIGH 75: CPT

## 2021-01-01 PROCEDURE — 71045 X-RAY EXAM CHEST 1 VIEW: CPT | Mod: 26,76

## 2021-01-01 PROCEDURE — 93970 EXTREMITY STUDY: CPT | Mod: 26

## 2021-01-01 PROCEDURE — 93010 ELECTROCARDIOGRAM REPORT: CPT

## 2021-01-01 PROCEDURE — 95819 EEG AWAKE AND ASLEEP: CPT | Mod: 26

## 2021-01-01 PROCEDURE — 99221 1ST HOSP IP/OBS SF/LOW 40: CPT | Mod: GC,AI

## 2021-01-01 PROCEDURE — 99222 1ST HOSP IP/OBS MODERATE 55: CPT

## 2021-01-01 PROCEDURE — 99214 OFFICE O/P EST MOD 30 MIN: CPT

## 2021-01-01 PROCEDURE — 99285 EMERGENCY DEPT VISIT HI MDM: CPT

## 2021-01-01 PROCEDURE — 99213 OFFICE O/P EST LOW 20 MIN: CPT

## 2021-01-01 PROCEDURE — 71045 X-RAY EXAM CHEST 1 VIEW: CPT | Mod: 26,77

## 2021-01-01 PROCEDURE — 70498 CT ANGIOGRAPHY NECK: CPT | Mod: 26,MA

## 2021-01-01 PROCEDURE — 70496 CT ANGIOGRAPHY HEAD: CPT | Mod: 26,MA

## 2021-01-01 PROCEDURE — 95816 EEG AWAKE AND DROWSY: CPT | Mod: 26

## 2021-01-01 PROCEDURE — G0427: CPT | Mod: 95

## 2021-01-01 PROCEDURE — 0042T: CPT

## 2021-01-01 PROCEDURE — 76770 US EXAM ABDO BACK WALL COMP: CPT | Mod: 26

## 2021-01-01 PROCEDURE — 70551 MRI BRAIN STEM W/O DYE: CPT | Mod: 26

## 2021-01-01 PROCEDURE — 31500 INSERT EMERGENCY AIRWAY: CPT

## 2021-01-01 RX ORDER — IPRATROPIUM/ALBUTEROL SULFATE 18-103MCG
3 AEROSOL WITH ADAPTER (GRAM) INHALATION EVERY 6 HOURS
Refills: 0 | Status: DISCONTINUED | OUTPATIENT
Start: 2021-01-01 | End: 2021-01-01

## 2021-01-01 RX ORDER — ENOXAPARIN SODIUM 100 MG/ML
40 INJECTION SUBCUTANEOUS DAILY
Refills: 0 | Status: DISCONTINUED | OUTPATIENT
Start: 2021-01-01 | End: 2021-01-01

## 2021-01-01 RX ORDER — GABAPENTIN 400 MG/1
1 CAPSULE ORAL
Qty: 90 | Refills: 0
Start: 2021-01-01 | End: 2021-01-01

## 2021-01-01 RX ORDER — LIDOCAINE 4 G/100G
1 CREAM TOPICAL ONCE
Refills: 0 | Status: COMPLETED | OUTPATIENT
Start: 2021-01-01 | End: 2021-01-01

## 2021-01-01 RX ORDER — SODIUM,POTASSIUM PHOSPHATES 278-250MG
1 POWDER IN PACKET (EA) ORAL
Refills: 0 | Status: COMPLETED | OUTPATIENT
Start: 2021-01-01 | End: 2021-01-01

## 2021-01-01 RX ORDER — POTASSIUM CHLORIDE 20 MEQ
20 PACKET (EA) ORAL ONCE
Refills: 0 | Status: COMPLETED | OUTPATIENT
Start: 2021-01-01 | End: 2021-01-01

## 2021-01-01 RX ORDER — ACETAMINOPHEN 500 MG
650 TABLET ORAL EVERY 6 HOURS
Refills: 0 | Status: DISCONTINUED | OUTPATIENT
Start: 2021-01-01 | End: 2021-01-01

## 2021-01-01 RX ORDER — QUETIAPINE FUMARATE 200 MG/1
25 TABLET, FILM COATED ORAL
Qty: 0 | Refills: 0 | DISCHARGE

## 2021-01-01 RX ORDER — HALOPERIDOL DECANOATE 100 MG/ML
0.5 INJECTION INTRAMUSCULAR ONCE
Refills: 0 | Status: COMPLETED | OUTPATIENT
Start: 2021-01-01 | End: 2021-01-01

## 2021-01-01 RX ORDER — DIVALPROEX SODIUM 500 MG/1
1 TABLET, DELAYED RELEASE ORAL
Qty: 0 | Refills: 0 | DISCHARGE
Start: 2021-01-01

## 2021-01-01 RX ORDER — MAGNESIUM SULFATE 500 MG/ML
2 VIAL (ML) INJECTION ONCE
Refills: 0 | Status: COMPLETED | OUTPATIENT
Start: 2021-01-01 | End: 2021-01-01

## 2021-01-01 RX ORDER — TRAMADOL HYDROCHLORIDE 50 MG/1
25 TABLET ORAL ONCE
Refills: 0 | Status: DISCONTINUED | OUTPATIENT
Start: 2021-01-01 | End: 2021-01-01

## 2021-01-01 RX ORDER — QUETIAPINE FUMARATE 200 MG/1
25 TABLET, FILM COATED ORAL
Refills: 0 | Status: DISCONTINUED | OUTPATIENT
Start: 2021-01-01 | End: 2021-01-01

## 2021-01-01 RX ORDER — ASPIRIN/CALCIUM CARB/MAGNESIUM 324 MG
81 TABLET ORAL
Qty: 10 | Refills: 0
Start: 2021-01-01

## 2021-01-01 RX ORDER — ROCURONIUM BROMIDE 10 MG/ML
100 VIAL (ML) INTRAVENOUS ONCE
Refills: 0 | Status: COMPLETED | OUTPATIENT
Start: 2021-01-01 | End: 2021-01-01

## 2021-01-01 RX ORDER — ATORVASTATIN CALCIUM 80 MG/1
10 TABLET, FILM COATED ORAL AT BEDTIME
Refills: 0 | Status: DISCONTINUED | OUTPATIENT
Start: 2021-01-01 | End: 2021-01-01

## 2021-01-01 RX ORDER — ASPIRIN/CALCIUM CARB/MAGNESIUM 324 MG
81 TABLET ORAL DAILY
Refills: 0 | Status: DISCONTINUED | OUTPATIENT
Start: 2021-01-01 | End: 2021-01-01

## 2021-01-01 RX ORDER — CLONAZEPAM 1 MG
1 TABLET ORAL
Qty: 30 | Refills: 0
Start: 2021-01-01 | End: 2021-01-01

## 2021-01-01 RX ORDER — HALOPERIDOL DECANOATE 100 MG/ML
1 INJECTION INTRAMUSCULAR ONCE
Refills: 0 | Status: COMPLETED | OUTPATIENT
Start: 2021-01-01 | End: 2021-01-01

## 2021-01-01 RX ORDER — LEVETIRACETAM 250 MG/1
1000 TABLET, FILM COATED ORAL EVERY 12 HOURS
Refills: 0 | Status: DISCONTINUED | OUTPATIENT
Start: 2021-01-01 | End: 2021-01-01

## 2021-01-01 RX ORDER — POTASSIUM CHLORIDE 20 MEQ
40 PACKET (EA) ORAL ONCE
Refills: 0 | Status: DISCONTINUED | OUTPATIENT
Start: 2021-01-01 | End: 2021-01-01

## 2021-01-01 RX ORDER — CHLORHEXIDINE GLUCONATE 213 G/1000ML
15 SOLUTION TOPICAL EVERY 12 HOURS
Refills: 0 | Status: DISCONTINUED | OUTPATIENT
Start: 2021-01-01 | End: 2021-01-01

## 2021-01-01 RX ORDER — CLONAZEPAM 1 MG
0.5 TABLET ORAL AT BEDTIME
Refills: 0 | Status: DISCONTINUED | OUTPATIENT
Start: 2021-01-01 | End: 2021-01-01

## 2021-01-01 RX ORDER — FOLIC ACID 1 MG/1
1 TABLET ORAL
Qty: 30 | Refills: 5 | Status: ACTIVE | COMMUNITY
Start: 2021-03-02 | End: 1900-01-01

## 2021-01-01 RX ORDER — POTASSIUM CHLORIDE 20 MEQ
40 PACKET (EA) ORAL ONCE
Refills: 0 | Status: COMPLETED | OUTPATIENT
Start: 2021-01-01 | End: 2021-01-01

## 2021-01-01 RX ORDER — QUETIAPINE FUMARATE 200 MG/1
50 TABLET, FILM COATED ORAL ONCE
Refills: 0 | Status: COMPLETED | OUTPATIENT
Start: 2021-01-01 | End: 2021-01-01

## 2021-01-01 RX ORDER — ATORVASTATIN CALCIUM 80 MG/1
40 TABLET, FILM COATED ORAL AT BEDTIME
Refills: 0 | Status: DISCONTINUED | OUTPATIENT
Start: 2021-01-01 | End: 2021-01-01

## 2021-01-01 RX ORDER — ATORVASTATIN CALCIUM 80 MG/1
80 TABLET, FILM COATED ORAL AT BEDTIME
Refills: 0 | Status: DISCONTINUED | OUTPATIENT
Start: 2021-01-01 | End: 2021-01-01

## 2021-01-01 RX ORDER — SENNA PLUS 8.6 MG/1
2 TABLET ORAL AT BEDTIME
Refills: 0 | Status: DISCONTINUED | OUTPATIENT
Start: 2021-01-01 | End: 2021-01-01

## 2021-01-01 RX ORDER — PIPERACILLIN AND TAZOBACTAM 4; .5 G/20ML; G/20ML
3.38 INJECTION, POWDER, LYOPHILIZED, FOR SOLUTION INTRAVENOUS EVERY 8 HOURS
Refills: 0 | Status: DISCONTINUED | OUTPATIENT
Start: 2021-01-01 | End: 2021-01-01

## 2021-01-01 RX ORDER — QUETIAPINE FUMARATE 200 MG/1
25 TABLET, FILM COATED ORAL THREE TIMES A DAY
Refills: 0 | Status: DISCONTINUED | OUTPATIENT
Start: 2021-01-01 | End: 2021-01-01

## 2021-01-01 RX ORDER — EPINEPHRINE 11.25MG/ML
3 SOLUTION, NON-ORAL INHALATION ONCE
Refills: 0 | Status: DISCONTINUED | OUTPATIENT
Start: 2021-01-01 | End: 2021-01-01

## 2021-01-01 RX ORDER — AMLODIPINE BESYLATE 2.5 MG/1
1 TABLET ORAL
Qty: 0 | Refills: 0 | DISCHARGE

## 2021-01-01 RX ORDER — CEFTRIAXONE 500 MG/1
1000 INJECTION, POWDER, FOR SOLUTION INTRAMUSCULAR; INTRAVENOUS ONCE
Refills: 0 | Status: COMPLETED | OUTPATIENT
Start: 2021-01-01 | End: 2021-01-01

## 2021-01-01 RX ORDER — HALOPERIDOL DECANOATE 100 MG/ML
2.5 INJECTION INTRAMUSCULAR ONCE
Refills: 0 | Status: DISCONTINUED | OUTPATIENT
Start: 2021-01-01 | End: 2021-01-01

## 2021-01-01 RX ORDER — TRAMADOL HYDROCHLORIDE 50 MG/1
25 TABLET ORAL EVERY 6 HOURS
Refills: 0 | Status: DISCONTINUED | OUTPATIENT
Start: 2021-01-01 | End: 2021-01-01

## 2021-01-01 RX ORDER — SODIUM CHLORIDE 9 MG/ML
500 INJECTION INTRAMUSCULAR; INTRAVENOUS; SUBCUTANEOUS ONCE
Refills: 0 | Status: COMPLETED | OUTPATIENT
Start: 2021-01-01 | End: 2021-01-01

## 2021-01-01 RX ORDER — METOPROLOL TARTRATE 50 MG
1 TABLET ORAL
Qty: 0 | Refills: 0 | DISCHARGE

## 2021-01-01 RX ORDER — POTASSIUM PHOSPHATE, MONOBASIC POTASSIUM PHOSPHATE, DIBASIC 236; 224 MG/ML; MG/ML
30 INJECTION, SOLUTION INTRAVENOUS ONCE
Refills: 0 | Status: COMPLETED | OUTPATIENT
Start: 2021-01-01 | End: 2021-01-01

## 2021-01-01 RX ORDER — METOPROLOL TARTRATE 50 MG
25 TABLET ORAL ONCE
Refills: 0 | Status: COMPLETED | OUTPATIENT
Start: 2021-01-01 | End: 2021-01-01

## 2021-01-01 RX ORDER — ALTEPLASE 100 MG
4.1 KIT INTRAVENOUS ONCE
Refills: 0 | Status: COMPLETED | OUTPATIENT
Start: 2021-01-01 | End: 2021-01-01

## 2021-01-01 RX ORDER — QUETIAPINE FUMARATE 200 MG/1
100 TABLET, FILM COATED ORAL AT BEDTIME
Refills: 0 | Status: DISCONTINUED | OUTPATIENT
Start: 2021-01-01 | End: 2021-01-01

## 2021-01-01 RX ORDER — QUETIAPINE FUMARATE 200 MG/1
25 TABLET, FILM COATED ORAL EVERY 8 HOURS
Refills: 0 | Status: DISCONTINUED | OUTPATIENT
Start: 2021-01-01 | End: 2021-01-01

## 2021-01-01 RX ORDER — QUETIAPINE FUMARATE 200 MG/1
1 TABLET, FILM COATED ORAL
Qty: 0 | Refills: 0 | DISCHARGE
Start: 2021-01-01 | End: 2021-01-01

## 2021-01-01 RX ORDER — FOLIC ACID 0.8 MG
1 TABLET ORAL DAILY
Refills: 0 | Status: DISCONTINUED | OUTPATIENT
Start: 2021-01-01 | End: 2021-01-01

## 2021-01-01 RX ORDER — GABAPENTIN 400 MG/1
100 CAPSULE ORAL EVERY 8 HOURS
Refills: 0 | Status: DISCONTINUED | OUTPATIENT
Start: 2021-01-01 | End: 2021-01-01

## 2021-01-01 RX ORDER — HYDROCORTISONE 1 %
2.5 OINTMENT (GRAM) TOPICAL
Qty: 5 | Refills: 0
Start: 2021-01-01

## 2021-01-01 RX ORDER — PROPOFOL 10 MG/ML
40 INJECTION, EMULSION INTRAVENOUS
Qty: 1000 | Refills: 0 | Status: DISCONTINUED | OUTPATIENT
Start: 2021-01-01 | End: 2021-01-01

## 2021-01-01 RX ORDER — DIVALPROEX SODIUM 500 MG/1
1 TABLET, DELAYED RELEASE ORAL
Qty: 60 | Refills: 0
Start: 2021-01-01 | End: 2021-01-01

## 2021-01-01 RX ORDER — METOPROLOL TARTRATE 50 MG
25 TABLET ORAL
Refills: 0 | Status: DISCONTINUED | OUTPATIENT
Start: 2021-01-01 | End: 2021-01-01

## 2021-01-01 RX ORDER — METOPROLOL TARTRATE 50 MG
50 TABLET ORAL DAILY
Refills: 0 | Status: DISCONTINUED | OUTPATIENT
Start: 2021-01-01 | End: 2021-01-01

## 2021-01-01 RX ORDER — LANOLIN ALCOHOL/MO/W.PET/CERES
1 CREAM (GRAM) TOPICAL
Qty: 0 | Refills: 0 | DISCHARGE

## 2021-01-01 RX ORDER — NICARDIPINE HYDROCHLORIDE 30 MG/1
5 CAPSULE, EXTENDED RELEASE ORAL
Qty: 40 | Refills: 0 | Status: DISCONTINUED | OUTPATIENT
Start: 2021-01-01 | End: 2021-01-01

## 2021-01-01 RX ORDER — QUETIAPINE FUMARATE 200 MG/1
50 TABLET, FILM COATED ORAL AT BEDTIME
Refills: 0 | Status: DISCONTINUED | OUTPATIENT
Start: 2021-01-01 | End: 2021-01-01

## 2021-01-01 RX ORDER — VANCOMYCIN HCL 1 G
125 VIAL (EA) INTRAVENOUS EVERY 6 HOURS
Refills: 0 | Status: DISCONTINUED | OUTPATIENT
Start: 2021-01-01 | End: 2021-01-01

## 2021-01-01 RX ORDER — CHLORHEXIDINE GLUCONATE 213 G/1000ML
1 SOLUTION TOPICAL
Refills: 0 | Status: DISCONTINUED | OUTPATIENT
Start: 2021-01-01 | End: 2021-01-01

## 2021-01-01 RX ORDER — ROCURONIUM BROMIDE 10 MG/ML
46 VIAL (ML) INTRAVENOUS ONCE
Refills: 0 | Status: DISCONTINUED | OUTPATIENT
Start: 2021-01-01 | End: 2021-01-01

## 2021-01-01 RX ORDER — LANOLIN ALCOHOL/MO/W.PET/CERES
5 CREAM (GRAM) TOPICAL AT BEDTIME
Refills: 0 | Status: DISCONTINUED | OUTPATIENT
Start: 2021-01-01 | End: 2021-01-01

## 2021-01-01 RX ORDER — DEXMEDETOMIDINE HYDROCHLORIDE IN 0.9% SODIUM CHLORIDE 4 UG/ML
0.2 INJECTION INTRAVENOUS
Qty: 400 | Refills: 0 | Status: DISCONTINUED | OUTPATIENT
Start: 2021-01-01 | End: 2021-01-01

## 2021-01-01 RX ORDER — FAMOTIDINE 10 MG/ML
20 INJECTION INTRAVENOUS EVERY 12 HOURS
Refills: 0 | Status: DISCONTINUED | OUTPATIENT
Start: 2021-01-01 | End: 2021-01-01

## 2021-01-01 RX ORDER — PROPOFOL 10 MG/ML
5 INJECTION, EMULSION INTRAVENOUS
Qty: 1000 | Refills: 0 | Status: DISCONTINUED | OUTPATIENT
Start: 2021-01-01 | End: 2021-01-01

## 2021-01-01 RX ORDER — TAMSULOSIN HYDROCHLORIDE 0.4 MG/1
2 CAPSULE ORAL
Qty: 0 | Refills: 0 | DISCHARGE
Start: 2021-01-01

## 2021-01-01 RX ORDER — QUETIAPINE FUMARATE 200 MG/1
1 TABLET, FILM COATED ORAL
Qty: 90 | Refills: 0
Start: 2021-01-01 | End: 2021-01-01

## 2021-01-01 RX ORDER — PROPOFOL 10 MG/ML
30 INJECTION, EMULSION INTRAVENOUS
Qty: 1000 | Refills: 0 | Status: DISCONTINUED | OUTPATIENT
Start: 2021-01-01 | End: 2021-01-01

## 2021-01-01 RX ORDER — PIPERACILLIN AND TAZOBACTAM 4; .5 G/20ML; G/20ML
3.38 INJECTION, POWDER, LYOPHILIZED, FOR SOLUTION INTRAVENOUS ONCE
Refills: 0 | Status: COMPLETED | OUTPATIENT
Start: 2021-01-01 | End: 2021-01-01

## 2021-01-01 RX ORDER — FAMOTIDINE 10 MG/ML
20 INJECTION INTRAVENOUS DAILY
Refills: 0 | Status: DISCONTINUED | OUTPATIENT
Start: 2021-01-01 | End: 2021-01-01

## 2021-01-01 RX ORDER — QUETIAPINE FUMARATE 200 MG/1
1 TABLET, FILM COATED ORAL
Qty: 0 | Refills: 0 | DISCHARGE

## 2021-01-01 RX ORDER — LANOLIN ALCOHOL/MO/W.PET/CERES
1 CREAM (GRAM) TOPICAL
Qty: 30 | Refills: 0
Start: 2021-01-01 | End: 2021-01-01

## 2021-01-01 RX ORDER — VANCOMYCIN HCL 1 G
2.5 VIAL (EA) INTRAVENOUS
Qty: 120 | Refills: 0
Start: 2021-01-01 | End: 2022-01-01

## 2021-01-01 RX ORDER — TRAMADOL HYDROCHLORIDE 50 MG/1
0.5 TABLET ORAL
Qty: 10 | Refills: 0
Start: 2021-01-01

## 2021-01-01 RX ORDER — ALTEPLASE 100 MG
37.3 KIT INTRAVENOUS ONCE
Refills: 0 | Status: COMPLETED | OUTPATIENT
Start: 2021-01-01 | End: 2021-01-01

## 2021-01-01 RX ORDER — ACETAMINOPHEN 500 MG
1000 TABLET ORAL ONCE
Refills: 0 | Status: COMPLETED | OUTPATIENT
Start: 2021-01-01 | End: 2021-01-01

## 2021-01-01 RX ORDER — LANOLIN ALCOHOL/MO/W.PET/CERES
10 CREAM (GRAM) TOPICAL AT BEDTIME
Refills: 0 | Status: DISCONTINUED | OUTPATIENT
Start: 2021-01-01 | End: 2021-01-01

## 2021-01-01 RX ORDER — VANCOMYCIN HCL 1 G
1 VIAL (EA) INTRAVENOUS
Qty: 52 | Refills: 0
Start: 2021-01-01 | End: 2022-01-01

## 2021-01-01 RX ORDER — SODIUM CHLORIDE 9 MG/ML
500 INJECTION, SOLUTION INTRAVENOUS ONCE
Refills: 0 | Status: COMPLETED | OUTPATIENT
Start: 2021-01-01 | End: 2021-01-01

## 2021-01-01 RX ORDER — CEFTRIAXONE 500 MG/1
1000 INJECTION, POWDER, FOR SOLUTION INTRAMUSCULAR; INTRAVENOUS EVERY 24 HOURS
Refills: 0 | Status: COMPLETED | OUTPATIENT
Start: 2021-01-01 | End: 2021-01-01

## 2021-01-01 RX ORDER — LEVETIRACETAM 250 MG/1
1000 TABLET, FILM COATED ORAL
Refills: 0 | Status: DISCONTINUED | OUTPATIENT
Start: 2021-01-01 | End: 2021-01-01

## 2021-01-01 RX ORDER — TRAMADOL HYDROCHLORIDE 50 MG/1
0.5 TABLET ORAL
Qty: 0 | Refills: 0 | DISCHARGE
Start: 2021-01-01

## 2021-01-01 RX ORDER — TAMSULOSIN HYDROCHLORIDE 0.4 MG/1
0.8 CAPSULE ORAL AT BEDTIME
Refills: 0 | Status: DISCONTINUED | OUTPATIENT
Start: 2021-01-01 | End: 2021-01-01

## 2021-01-01 RX ORDER — METOPROLOL TARTRATE 50 MG
25 TABLET ORAL DAILY
Refills: 0 | Status: DISCONTINUED | OUTPATIENT
Start: 2021-01-01 | End: 2021-01-01

## 2021-01-01 RX ORDER — TRAMADOL HYDROCHLORIDE 50 MG/1
0.5 TABLET ORAL
Qty: 10 | Refills: 0
Start: 2021-01-01 | End: 2021-01-01

## 2021-01-01 RX ORDER — AMLODIPINE BESYLATE 2.5 MG/1
5 TABLET ORAL DAILY
Refills: 0 | Status: DISCONTINUED | OUTPATIENT
Start: 2021-01-01 | End: 2021-01-01

## 2021-01-01 RX ORDER — LORAZEPAM 0.5 MG/1
0.5 TABLET ORAL TWICE DAILY
Qty: 30 | Refills: 0 | Status: DISCONTINUED | COMMUNITY
Start: 2021-01-06 | End: 2021-01-01

## 2021-01-01 RX ORDER — PREGABALIN 225 MG/1
500 CAPSULE ORAL DAILY
Refills: 0 | Status: DISCONTINUED | OUTPATIENT
Start: 2021-01-01 | End: 2021-01-01

## 2021-01-01 RX ORDER — METOPROLOL TARTRATE 50 MG
1 TABLET ORAL
Qty: 30 | Refills: 0
Start: 2021-01-01 | End: 2021-01-01

## 2021-01-01 RX ORDER — FENTANYL CITRATE 50 UG/ML
25 INJECTION INTRAVENOUS
Refills: 0 | Status: DISCONTINUED | OUTPATIENT
Start: 2021-01-01 | End: 2021-01-01

## 2021-01-01 RX ORDER — LEVETIRACETAM 250 MG/1
1 TABLET, FILM COATED ORAL
Qty: 30 | Refills: 2
Start: 2021-01-01 | End: 2022-01-01

## 2021-01-01 RX ORDER — DIVALPROEX SODIUM 500 MG/1
250 TABLET, DELAYED RELEASE ORAL
Refills: 0 | Status: DISCONTINUED | OUTPATIENT
Start: 2021-01-01 | End: 2021-01-01

## 2021-01-01 RX ORDER — HEPARIN SODIUM 5000 [USP'U]/ML
5000 INJECTION INTRAVENOUS; SUBCUTANEOUS EVERY 12 HOURS
Refills: 0 | Status: DISCONTINUED | OUTPATIENT
Start: 2021-01-01 | End: 2021-01-01

## 2021-01-01 RX ORDER — DICLOFENAC SODIUM 30 MG/G
2 GEL TOPICAL
Qty: 10 | Refills: 0
Start: 2021-01-01

## 2021-01-01 RX ORDER — ETOMIDATE 2 MG/ML
14 INJECTION INTRAVENOUS ONCE
Refills: 0 | Status: DISCONTINUED | OUTPATIENT
Start: 2021-01-01 | End: 2021-01-01

## 2021-01-01 RX ORDER — GABAPENTIN 400 MG/1
1 CAPSULE ORAL
Qty: 0 | Refills: 0 | DISCHARGE
Start: 2021-01-01

## 2021-01-01 RX ORDER — PREGABALIN 225 MG/1
1 CAPSULE ORAL
Qty: 30 | Refills: 0
Start: 2021-01-01

## 2021-01-01 RX ORDER — ACETAMINOPHEN 500 MG
2 TABLET ORAL
Qty: 240 | Refills: 0
Start: 2021-01-01 | End: 2021-01-01

## 2021-01-01 RX ORDER — QUETIAPINE FUMARATE 200 MG/1
1 TABLET, FILM COATED ORAL
Qty: 30 | Refills: 0
Start: 2021-01-01 | End: 2021-01-01

## 2021-01-01 RX ORDER — LEVETIRACETAM 250 MG/1
1000 TABLET, FILM COATED ORAL ONCE
Refills: 0 | Status: COMPLETED | OUTPATIENT
Start: 2021-01-01 | End: 2021-01-01

## 2021-01-01 RX ORDER — GABAPENTIN 400 MG/1
100 CAPSULE ORAL THREE TIMES A DAY
Refills: 0 | Status: DISCONTINUED | OUTPATIENT
Start: 2021-01-01 | End: 2021-01-01

## 2021-01-01 RX ORDER — ETOMIDATE 2 MG/ML
20 INJECTION INTRAVENOUS ONCE
Refills: 0 | Status: COMPLETED | OUTPATIENT
Start: 2021-01-01 | End: 2021-01-01

## 2021-01-01 RX ORDER — TRAMADOL HYDROCHLORIDE 50 MG/1
25 TABLET ORAL EVERY 8 HOURS
Refills: 0 | Status: DISCONTINUED | OUTPATIENT
Start: 2021-01-01 | End: 2021-01-01

## 2021-01-01 RX ORDER — POLYETHYLENE GLYCOL 3350 17 G/17G
17 POWDER, FOR SOLUTION ORAL DAILY
Refills: 0 | Status: DISCONTINUED | OUTPATIENT
Start: 2021-01-01 | End: 2021-01-01

## 2021-01-01 RX ADMIN — LEVETIRACETAM 400 MILLIGRAM(S): 250 TABLET, FILM COATED ORAL at 05:48

## 2021-01-01 RX ADMIN — GABAPENTIN 100 MILLIGRAM(S): 400 CAPSULE ORAL at 21:10

## 2021-01-01 RX ADMIN — DEXMEDETOMIDINE HYDROCHLORIDE IN 0.9% SODIUM CHLORIDE 2.23 MICROGRAM(S)/KG/HR: 4 INJECTION INTRAVENOUS at 14:01

## 2021-01-01 RX ADMIN — Medication 25 MILLIGRAM(S): at 17:24

## 2021-01-01 RX ADMIN — ALTEPLASE 246 MILLIGRAM(S): KIT at 18:43

## 2021-01-01 RX ADMIN — Medication 5 MILLIGRAM(S): at 21:34

## 2021-01-01 RX ADMIN — Medication 25 MILLIGRAM(S): at 17:44

## 2021-01-01 RX ADMIN — PREGABALIN 500 MICROGRAM(S): 225 CAPSULE ORAL at 11:26

## 2021-01-01 RX ADMIN — Medication 25 GRAM(S): at 10:54

## 2021-01-01 RX ADMIN — TAMSULOSIN HYDROCHLORIDE 0.8 MILLIGRAM(S): 0.4 CAPSULE ORAL at 21:01

## 2021-01-01 RX ADMIN — Medication 650 MILLIGRAM(S): at 23:00

## 2021-01-01 RX ADMIN — Medication 650 MILLIGRAM(S): at 22:24

## 2021-01-01 RX ADMIN — HEPARIN SODIUM 5000 UNIT(S): 5000 INJECTION INTRAVENOUS; SUBCUTANEOUS at 05:15

## 2021-01-01 RX ADMIN — SENNA PLUS 2 TABLET(S): 8.6 TABLET ORAL at 21:28

## 2021-01-01 RX ADMIN — PROPOFOL 1.38 MICROGRAM(S)/KG/MIN: 10 INJECTION, EMULSION INTRAVENOUS at 18:33

## 2021-01-01 RX ADMIN — FENTANYL CITRATE 25 MICROGRAM(S): 50 INJECTION INTRAVENOUS at 08:04

## 2021-01-01 RX ADMIN — ENOXAPARIN SODIUM 40 MILLIGRAM(S): 100 INJECTION SUBCUTANEOUS at 11:15

## 2021-01-01 RX ADMIN — Medication 25 MILLIGRAM(S): at 05:45

## 2021-01-01 RX ADMIN — PROPOFOL 8.28 MICROGRAM(S)/KG/MIN: 10 INJECTION, EMULSION INTRAVENOUS at 04:53

## 2021-01-01 RX ADMIN — FENTANYL CITRATE 25 MICROGRAM(S): 50 INJECTION INTRAVENOUS at 05:30

## 2021-01-01 RX ADMIN — Medication 81 MILLIGRAM(S): at 11:06

## 2021-01-01 RX ADMIN — Medication 650 MILLIGRAM(S): at 17:06

## 2021-01-01 RX ADMIN — NICARDIPINE HYDROCHLORIDE 25 MG/HR: 30 CAPSULE, EXTENDED RELEASE ORAL at 20:02

## 2021-01-01 RX ADMIN — FENTANYL CITRATE 25 MICROGRAM(S): 50 INJECTION INTRAVENOUS at 17:00

## 2021-01-01 RX ADMIN — Medication 81 MILLIGRAM(S): at 12:24

## 2021-01-01 RX ADMIN — LEVETIRACETAM 400 MILLIGRAM(S): 250 TABLET, FILM COATED ORAL at 17:27

## 2021-01-01 RX ADMIN — HEPARIN SODIUM 5000 UNIT(S): 5000 INJECTION INTRAVENOUS; SUBCUTANEOUS at 05:44

## 2021-01-01 RX ADMIN — FENTANYL CITRATE 25 MICROGRAM(S): 50 INJECTION INTRAVENOUS at 16:00

## 2021-01-01 RX ADMIN — FAMOTIDINE 20 MILLIGRAM(S): 10 INJECTION INTRAVENOUS at 18:27

## 2021-01-01 RX ADMIN — HEPARIN SODIUM 5000 UNIT(S): 5000 INJECTION INTRAVENOUS; SUBCUTANEOUS at 18:06

## 2021-01-01 RX ADMIN — ATORVASTATIN CALCIUM 10 MILLIGRAM(S): 80 TABLET, FILM COATED ORAL at 21:01

## 2021-01-01 RX ADMIN — QUETIAPINE FUMARATE 50 MILLIGRAM(S): 200 TABLET, FILM COATED ORAL at 21:22

## 2021-01-01 RX ADMIN — LEVETIRACETAM 400 MILLIGRAM(S): 250 TABLET, FILM COATED ORAL at 18:18

## 2021-01-01 RX ADMIN — FENTANYL CITRATE 25 MICROGRAM(S): 50 INJECTION INTRAVENOUS at 19:19

## 2021-01-01 RX ADMIN — AMLODIPINE BESYLATE 5 MILLIGRAM(S): 2.5 TABLET ORAL at 05:05

## 2021-01-01 RX ADMIN — PIPERACILLIN AND TAZOBACTAM 25 GRAM(S): 4; .5 INJECTION, POWDER, LYOPHILIZED, FOR SOLUTION INTRAVENOUS at 21:19

## 2021-01-01 RX ADMIN — SENNA PLUS 2 TABLET(S): 8.6 TABLET ORAL at 21:18

## 2021-01-01 RX ADMIN — GABAPENTIN 100 MILLIGRAM(S): 400 CAPSULE ORAL at 22:01

## 2021-01-01 RX ADMIN — SENNA PLUS 2 TABLET(S): 8.6 TABLET ORAL at 21:31

## 2021-01-01 RX ADMIN — FENTANYL CITRATE 25 MICROGRAM(S): 50 INJECTION INTRAVENOUS at 07:50

## 2021-01-01 RX ADMIN — CHLORHEXIDINE GLUCONATE 1 APPLICATION(S): 213 SOLUTION TOPICAL at 05:47

## 2021-01-01 RX ADMIN — LEVETIRACETAM 400 MILLIGRAM(S): 250 TABLET, FILM COATED ORAL at 12:16

## 2021-01-01 RX ADMIN — PIPERACILLIN AND TAZOBACTAM 25 GRAM(S): 4; .5 INJECTION, POWDER, LYOPHILIZED, FOR SOLUTION INTRAVENOUS at 05:04

## 2021-01-01 RX ADMIN — QUETIAPINE FUMARATE 100 MILLIGRAM(S): 200 TABLET, FILM COATED ORAL at 22:23

## 2021-01-01 RX ADMIN — QUETIAPINE FUMARATE 25 MILLIGRAM(S): 200 TABLET, FILM COATED ORAL at 21:20

## 2021-01-01 RX ADMIN — LEVETIRACETAM 400 MILLIGRAM(S): 250 TABLET, FILM COATED ORAL at 05:43

## 2021-01-01 RX ADMIN — Medication 25 MILLIGRAM(S): at 18:18

## 2021-01-01 RX ADMIN — GABAPENTIN 100 MILLIGRAM(S): 400 CAPSULE ORAL at 21:30

## 2021-01-01 RX ADMIN — Medication 1 MILLIGRAM(S): at 11:17

## 2021-01-01 RX ADMIN — GABAPENTIN 100 MILLIGRAM(S): 400 CAPSULE ORAL at 06:20

## 2021-01-01 RX ADMIN — HEPARIN SODIUM 5000 UNIT(S): 5000 INJECTION INTRAVENOUS; SUBCUTANEOUS at 05:11

## 2021-01-01 RX ADMIN — Medication 5 MILLIGRAM(S): at 21:22

## 2021-01-01 RX ADMIN — ATORVASTATIN CALCIUM 40 MILLIGRAM(S): 80 TABLET, FILM COATED ORAL at 21:49

## 2021-01-01 RX ADMIN — Medication 40 MILLIEQUIVALENT(S): at 12:15

## 2021-01-01 RX ADMIN — CHLORHEXIDINE GLUCONATE 1 APPLICATION(S): 213 SOLUTION TOPICAL at 05:12

## 2021-01-01 RX ADMIN — Medication 3 MILLILITER(S): at 19:38

## 2021-01-01 RX ADMIN — Medication 1 PACKET(S): at 23:20

## 2021-01-01 RX ADMIN — GABAPENTIN 100 MILLIGRAM(S): 400 CAPSULE ORAL at 05:05

## 2021-01-01 RX ADMIN — QUETIAPINE FUMARATE 25 MILLIGRAM(S): 200 TABLET, FILM COATED ORAL at 15:16

## 2021-01-01 RX ADMIN — Medication 50 GRAM(S): at 06:43

## 2021-01-01 RX ADMIN — PIPERACILLIN AND TAZOBACTAM 25 GRAM(S): 4; .5 INJECTION, POWDER, LYOPHILIZED, FOR SOLUTION INTRAVENOUS at 05:32

## 2021-01-01 RX ADMIN — FENTANYL CITRATE 25 MICROGRAM(S): 50 INJECTION INTRAVENOUS at 18:22

## 2021-01-01 RX ADMIN — GABAPENTIN 100 MILLIGRAM(S): 400 CAPSULE ORAL at 15:17

## 2021-01-01 RX ADMIN — QUETIAPINE FUMARATE 25 MILLIGRAM(S): 200 TABLET, FILM COATED ORAL at 21:31

## 2021-01-01 RX ADMIN — FENTANYL CITRATE 25 MICROGRAM(S): 50 INJECTION INTRAVENOUS at 13:00

## 2021-01-01 RX ADMIN — Medication 25 MILLIGRAM(S): at 17:30

## 2021-01-01 RX ADMIN — LEVETIRACETAM 400 MILLIGRAM(S): 250 TABLET, FILM COATED ORAL at 18:41

## 2021-01-01 RX ADMIN — PIPERACILLIN AND TAZOBACTAM 25 GRAM(S): 4; .5 INJECTION, POWDER, LYOPHILIZED, FOR SOLUTION INTRAVENOUS at 15:17

## 2021-01-01 RX ADMIN — TRAMADOL HYDROCHLORIDE 25 MILLIGRAM(S): 50 TABLET ORAL at 01:45

## 2021-01-01 RX ADMIN — CHLORHEXIDINE GLUCONATE 1 APPLICATION(S): 213 SOLUTION TOPICAL at 05:11

## 2021-01-01 RX ADMIN — Medication 81 MILLIGRAM(S): at 11:17

## 2021-01-01 RX ADMIN — Medication 1 MILLIGRAM(S): at 11:35

## 2021-01-01 RX ADMIN — PIPERACILLIN AND TAZOBACTAM 25 GRAM(S): 4; .5 INJECTION, POWDER, LYOPHILIZED, FOR SOLUTION INTRAVENOUS at 05:43

## 2021-01-01 RX ADMIN — DIVALPROEX SODIUM 250 MILLIGRAM(S): 500 TABLET, DELAYED RELEASE ORAL at 05:12

## 2021-01-01 RX ADMIN — Medication 25 MILLIGRAM(S): at 06:01

## 2021-01-01 RX ADMIN — TRAMADOL HYDROCHLORIDE 25 MILLIGRAM(S): 50 TABLET ORAL at 12:30

## 2021-01-01 RX ADMIN — AMLODIPINE BESYLATE 5 MILLIGRAM(S): 2.5 TABLET ORAL at 05:15

## 2021-01-01 RX ADMIN — TAMSULOSIN HYDROCHLORIDE 0.8 MILLIGRAM(S): 0.4 CAPSULE ORAL at 21:32

## 2021-01-01 RX ADMIN — Medication 1 MILLIGRAM(S): at 11:06

## 2021-01-01 RX ADMIN — Medication 5 MILLIGRAM(S): at 21:27

## 2021-01-01 RX ADMIN — QUETIAPINE FUMARATE 25 MILLIGRAM(S): 200 TABLET, FILM COATED ORAL at 21:01

## 2021-01-01 RX ADMIN — QUETIAPINE FUMARATE 100 MILLIGRAM(S): 200 TABLET, FILM COATED ORAL at 21:10

## 2021-01-01 RX ADMIN — Medication 100 MILLIGRAM(S): at 18:30

## 2021-01-01 RX ADMIN — ATORVASTATIN CALCIUM 40 MILLIGRAM(S): 80 TABLET, FILM COATED ORAL at 21:32

## 2021-01-01 RX ADMIN — Medication 100 MILLIEQUIVALENT(S): at 08:14

## 2021-01-01 RX ADMIN — FENTANYL CITRATE 25 MICROGRAM(S): 50 INJECTION INTRAVENOUS at 07:30

## 2021-01-01 RX ADMIN — FENTANYL CITRATE 25 MICROGRAM(S): 50 INJECTION INTRAVENOUS at 09:30

## 2021-01-01 RX ADMIN — PROPOFOL 8.01 MICROGRAM(S)/KG/MIN: 10 INJECTION, EMULSION INTRAVENOUS at 05:31

## 2021-01-01 RX ADMIN — GABAPENTIN 100 MILLIGRAM(S): 400 CAPSULE ORAL at 05:11

## 2021-01-01 RX ADMIN — CHLORHEXIDINE GLUCONATE 15 MILLILITER(S): 213 SOLUTION TOPICAL at 18:27

## 2021-01-01 RX ADMIN — HEPARIN SODIUM 5000 UNIT(S): 5000 INJECTION INTRAVENOUS; SUBCUTANEOUS at 17:32

## 2021-01-01 RX ADMIN — QUETIAPINE FUMARATE 25 MILLIGRAM(S): 200 TABLET, FILM COATED ORAL at 11:13

## 2021-01-01 RX ADMIN — AMLODIPINE BESYLATE 5 MILLIGRAM(S): 2.5 TABLET ORAL at 05:51

## 2021-01-01 RX ADMIN — ATORVASTATIN CALCIUM 10 MILLIGRAM(S): 80 TABLET, FILM COATED ORAL at 21:34

## 2021-01-01 RX ADMIN — ATORVASTATIN CALCIUM 10 MILLIGRAM(S): 80 TABLET, FILM COATED ORAL at 21:10

## 2021-01-01 RX ADMIN — LEVETIRACETAM 400 MILLIGRAM(S): 250 TABLET, FILM COATED ORAL at 05:50

## 2021-01-01 RX ADMIN — CEFTRIAXONE 100 MILLIGRAM(S): 500 INJECTION, POWDER, FOR SOLUTION INTRAMUSCULAR; INTRAVENOUS at 11:12

## 2021-01-01 RX ADMIN — GABAPENTIN 100 MILLIGRAM(S): 400 CAPSULE ORAL at 12:45

## 2021-01-01 RX ADMIN — Medication 125 MILLIGRAM(S): at 23:14

## 2021-01-01 RX ADMIN — QUETIAPINE FUMARATE 100 MILLIGRAM(S): 200 TABLET, FILM COATED ORAL at 21:49

## 2021-01-01 RX ADMIN — POLYETHYLENE GLYCOL 3350 17 GRAM(S): 17 POWDER, FOR SOLUTION ORAL at 11:01

## 2021-01-01 RX ADMIN — FAMOTIDINE 20 MILLIGRAM(S): 10 INJECTION INTRAVENOUS at 05:03

## 2021-01-01 RX ADMIN — AMLODIPINE BESYLATE 5 MILLIGRAM(S): 2.5 TABLET ORAL at 05:30

## 2021-01-01 RX ADMIN — GABAPENTIN 100 MILLIGRAM(S): 400 CAPSULE ORAL at 21:32

## 2021-01-01 RX ADMIN — FAMOTIDINE 104 MILLIGRAM(S): 10 INJECTION INTRAVENOUS at 11:53

## 2021-01-01 RX ADMIN — Medication 25 GRAM(S): at 12:15

## 2021-01-01 RX ADMIN — TRAMADOL HYDROCHLORIDE 25 MILLIGRAM(S): 50 TABLET ORAL at 11:10

## 2021-01-01 RX ADMIN — QUETIAPINE FUMARATE 25 MILLIGRAM(S): 200 TABLET, FILM COATED ORAL at 05:47

## 2021-01-01 RX ADMIN — HEPARIN SODIUM 5000 UNIT(S): 5000 INJECTION INTRAVENOUS; SUBCUTANEOUS at 06:05

## 2021-01-01 RX ADMIN — Medication 25 MILLIGRAM(S): at 17:58

## 2021-01-01 RX ADMIN — Medication 25 MILLIGRAM(S): at 18:06

## 2021-01-01 RX ADMIN — Medication 650 MILLIGRAM(S): at 16:19

## 2021-01-01 RX ADMIN — Medication 650 MILLIGRAM(S): at 14:50

## 2021-01-01 RX ADMIN — Medication 650 MILLIGRAM(S): at 11:23

## 2021-01-01 RX ADMIN — HALOPERIDOL DECANOATE 1 MILLIGRAM(S): 100 INJECTION INTRAMUSCULAR at 00:22

## 2021-01-01 RX ADMIN — ATORVASTATIN CALCIUM 80 MILLIGRAM(S): 80 TABLET, FILM COATED ORAL at 21:25

## 2021-01-01 RX ADMIN — Medication 81 MILLIGRAM(S): at 12:42

## 2021-01-01 RX ADMIN — DEXMEDETOMIDINE HYDROCHLORIDE IN 0.9% SODIUM CHLORIDE 2.23 MICROGRAM(S)/KG/HR: 4 INJECTION INTRAVENOUS at 08:55

## 2021-01-01 RX ADMIN — Medication 81 MILLIGRAM(S): at 12:38

## 2021-01-01 RX ADMIN — Medication 25 MILLIGRAM(S): at 05:12

## 2021-01-01 RX ADMIN — GABAPENTIN 100 MILLIGRAM(S): 400 CAPSULE ORAL at 14:22

## 2021-01-01 RX ADMIN — ATORVASTATIN CALCIUM 40 MILLIGRAM(S): 80 TABLET, FILM COATED ORAL at 21:10

## 2021-01-01 RX ADMIN — CHLORHEXIDINE GLUCONATE 1 APPLICATION(S): 213 SOLUTION TOPICAL at 06:16

## 2021-01-01 RX ADMIN — ATORVASTATIN CALCIUM 80 MILLIGRAM(S): 80 TABLET, FILM COATED ORAL at 21:18

## 2021-01-01 RX ADMIN — LEVETIRACETAM 400 MILLIGRAM(S): 250 TABLET, FILM COATED ORAL at 00:18

## 2021-01-01 RX ADMIN — Medication 1 MILLIGRAM(S): at 11:15

## 2021-01-01 RX ADMIN — ATORVASTATIN CALCIUM 10 MILLIGRAM(S): 80 TABLET, FILM COATED ORAL at 21:19

## 2021-01-01 RX ADMIN — Medication 25 MILLIGRAM(S): at 17:33

## 2021-01-01 RX ADMIN — GABAPENTIN 100 MILLIGRAM(S): 400 CAPSULE ORAL at 22:23

## 2021-01-01 RX ADMIN — Medication 125 MILLIGRAM(S): at 12:40

## 2021-01-01 RX ADMIN — HEPARIN SODIUM 5000 UNIT(S): 5000 INJECTION INTRAVENOUS; SUBCUTANEOUS at 17:46

## 2021-01-01 RX ADMIN — FENTANYL CITRATE 25 MICROGRAM(S): 50 INJECTION INTRAVENOUS at 02:30

## 2021-01-01 RX ADMIN — SENNA PLUS 2 TABLET(S): 8.6 TABLET ORAL at 21:10

## 2021-01-01 RX ADMIN — FENTANYL CITRATE 25 MICROGRAM(S): 50 INJECTION INTRAVENOUS at 23:20

## 2021-01-01 RX ADMIN — Medication 650 MILLIGRAM(S): at 15:19

## 2021-01-01 RX ADMIN — LEVETIRACETAM 400 MILLIGRAM(S): 250 TABLET, FILM COATED ORAL at 17:33

## 2021-01-01 RX ADMIN — TRAMADOL HYDROCHLORIDE 25 MILLIGRAM(S): 50 TABLET ORAL at 01:04

## 2021-01-01 RX ADMIN — Medication 50 GRAM(S): at 10:42

## 2021-01-01 RX ADMIN — Medication 25 MILLIGRAM(S): at 06:20

## 2021-01-01 RX ADMIN — Medication 1 MILLIGRAM(S): at 11:09

## 2021-01-01 RX ADMIN — PIPERACILLIN AND TAZOBACTAM 25 GRAM(S): 4; .5 INJECTION, POWDER, LYOPHILIZED, FOR SOLUTION INTRAVENOUS at 14:34

## 2021-01-01 RX ADMIN — QUETIAPINE FUMARATE 25 MILLIGRAM(S): 200 TABLET, FILM COATED ORAL at 05:04

## 2021-01-01 RX ADMIN — QUETIAPINE FUMARATE 25 MILLIGRAM(S): 200 TABLET, FILM COATED ORAL at 21:30

## 2021-01-01 RX ADMIN — Medication 1 MILLIGRAM(S): at 12:24

## 2021-01-01 RX ADMIN — GABAPENTIN 100 MILLIGRAM(S): 400 CAPSULE ORAL at 13:50

## 2021-01-01 RX ADMIN — DIVALPROEX SODIUM 250 MILLIGRAM(S): 500 TABLET, DELAYED RELEASE ORAL at 06:05

## 2021-01-01 RX ADMIN — QUETIAPINE FUMARATE 25 MILLIGRAM(S): 200 TABLET, FILM COATED ORAL at 01:05

## 2021-01-01 RX ADMIN — CEFTRIAXONE 100 MILLIGRAM(S): 500 INJECTION, POWDER, FOR SOLUTION INTRAMUSCULAR; INTRAVENOUS at 13:13

## 2021-01-01 RX ADMIN — Medication 1 PACKET(S): at 18:29

## 2021-01-01 RX ADMIN — HEPARIN SODIUM 5000 UNIT(S): 5000 INJECTION INTRAVENOUS; SUBCUTANEOUS at 06:14

## 2021-01-01 RX ADMIN — Medication 1 PACKET(S): at 05:44

## 2021-01-01 RX ADMIN — Medication 1 MILLIGRAM(S): at 12:38

## 2021-01-01 RX ADMIN — LEVETIRACETAM 400 MILLIGRAM(S): 250 TABLET, FILM COATED ORAL at 05:03

## 2021-01-01 RX ADMIN — Medication 81 MILLIGRAM(S): at 11:26

## 2021-01-01 RX ADMIN — Medication 3 MILLILITER(S): at 09:14

## 2021-01-01 RX ADMIN — GABAPENTIN 100 MILLIGRAM(S): 400 CAPSULE ORAL at 21:01

## 2021-01-01 RX ADMIN — LEVETIRACETAM 400 MILLIGRAM(S): 250 TABLET, FILM COATED ORAL at 21:25

## 2021-01-01 RX ADMIN — GABAPENTIN 100 MILLIGRAM(S): 400 CAPSULE ORAL at 21:49

## 2021-01-01 RX ADMIN — LEVETIRACETAM 400 MILLIGRAM(S): 250 TABLET, FILM COATED ORAL at 05:11

## 2021-01-01 RX ADMIN — CHLORHEXIDINE GLUCONATE 15 MILLILITER(S): 213 SOLUTION TOPICAL at 05:50

## 2021-01-01 RX ADMIN — Medication 125 MILLIGRAM(S): at 12:42

## 2021-01-01 RX ADMIN — Medication 1 MILLIGRAM(S): at 12:16

## 2021-01-01 RX ADMIN — Medication 25 MILLIGRAM(S): at 05:05

## 2021-01-01 RX ADMIN — GABAPENTIN 100 MILLIGRAM(S): 400 CAPSULE ORAL at 06:01

## 2021-01-01 RX ADMIN — QUETIAPINE FUMARATE 25 MILLIGRAM(S): 200 TABLET, FILM COATED ORAL at 21:09

## 2021-01-01 RX ADMIN — FENTANYL CITRATE 25 MICROGRAM(S): 50 INJECTION INTRAVENOUS at 02:17

## 2021-01-01 RX ADMIN — LEVETIRACETAM 400 MILLIGRAM(S): 250 TABLET, FILM COATED ORAL at 17:24

## 2021-01-01 RX ADMIN — Medication 1 MILLIGRAM(S): at 11:21

## 2021-01-01 RX ADMIN — QUETIAPINE FUMARATE 50 MILLIGRAM(S): 200 TABLET, FILM COATED ORAL at 21:27

## 2021-01-01 RX ADMIN — HEPARIN SODIUM 5000 UNIT(S): 5000 INJECTION INTRAVENOUS; SUBCUTANEOUS at 17:57

## 2021-01-01 RX ADMIN — LEVETIRACETAM 400 MILLIGRAM(S): 250 TABLET, FILM COATED ORAL at 06:14

## 2021-01-01 RX ADMIN — TAMSULOSIN HYDROCHLORIDE 0.8 MILLIGRAM(S): 0.4 CAPSULE ORAL at 21:10

## 2021-01-01 RX ADMIN — Medication 125 MILLIGRAM(S): at 05:10

## 2021-01-01 RX ADMIN — Medication 3 MILLILITER(S): at 14:25

## 2021-01-01 RX ADMIN — POTASSIUM PHOSPHATE, MONOBASIC POTASSIUM PHOSPHATE, DIBASIC 83.33 MILLIMOLE(S): 236; 224 INJECTION, SOLUTION INTRAVENOUS at 11:38

## 2021-01-01 RX ADMIN — Medication 3 MILLILITER(S): at 08:20

## 2021-01-01 RX ADMIN — HEPARIN SODIUM 5000 UNIT(S): 5000 INJECTION INTRAVENOUS; SUBCUTANEOUS at 06:21

## 2021-01-01 RX ADMIN — Medication 1 MILLIGRAM(S): at 13:13

## 2021-01-01 RX ADMIN — LEVETIRACETAM 400 MILLIGRAM(S): 250 TABLET, FILM COATED ORAL at 17:32

## 2021-01-01 RX ADMIN — PROPOFOL 8.28 MICROGRAM(S)/KG/MIN: 10 INJECTION, EMULSION INTRAVENOUS at 21:25

## 2021-01-01 RX ADMIN — Medication 1 MILLIGRAM(S): at 12:40

## 2021-01-01 RX ADMIN — Medication 25 MILLIGRAM(S): at 18:10

## 2021-01-01 RX ADMIN — Medication 1 MILLIGRAM(S): at 11:05

## 2021-01-01 RX ADMIN — GABAPENTIN 100 MILLIGRAM(S): 400 CAPSULE ORAL at 13:41

## 2021-01-01 RX ADMIN — SODIUM CHLORIDE 2000 MILLILITER(S): 9 INJECTION, SOLUTION INTRAVENOUS at 14:58

## 2021-01-01 RX ADMIN — Medication 25 MILLIGRAM(S): at 05:15

## 2021-01-01 RX ADMIN — DIVALPROEX SODIUM 250 MILLIGRAM(S): 500 TABLET, DELAYED RELEASE ORAL at 17:44

## 2021-01-01 RX ADMIN — Medication 125 MILLIGRAM(S): at 11:17

## 2021-01-01 RX ADMIN — Medication 650 MILLIGRAM(S): at 10:41

## 2021-01-01 RX ADMIN — PIPERACILLIN AND TAZOBACTAM 25 GRAM(S): 4; .5 INJECTION, POWDER, LYOPHILIZED, FOR SOLUTION INTRAVENOUS at 21:09

## 2021-01-01 RX ADMIN — CHLORHEXIDINE GLUCONATE 15 MILLILITER(S): 213 SOLUTION TOPICAL at 05:03

## 2021-01-01 RX ADMIN — Medication 81 MILLIGRAM(S): at 11:49

## 2021-01-01 RX ADMIN — LEVETIRACETAM 400 MILLIGRAM(S): 250 TABLET, FILM COATED ORAL at 05:04

## 2021-01-01 RX ADMIN — FAMOTIDINE 20 MILLIGRAM(S): 10 INJECTION INTRAVENOUS at 05:50

## 2021-01-01 RX ADMIN — AMLODIPINE BESYLATE 5 MILLIGRAM(S): 2.5 TABLET ORAL at 05:19

## 2021-01-01 RX ADMIN — QUETIAPINE FUMARATE 100 MILLIGRAM(S): 200 TABLET, FILM COATED ORAL at 21:31

## 2021-01-01 RX ADMIN — GABAPENTIN 100 MILLIGRAM(S): 400 CAPSULE ORAL at 13:13

## 2021-01-01 RX ADMIN — Medication 3 MILLILITER(S): at 13:09

## 2021-01-01 RX ADMIN — FAMOTIDINE 20 MILLIGRAM(S): 10 INJECTION INTRAVENOUS at 17:42

## 2021-01-01 RX ADMIN — GABAPENTIN 100 MILLIGRAM(S): 400 CAPSULE ORAL at 16:05

## 2021-01-01 RX ADMIN — Medication 3 MILLILITER(S): at 15:04

## 2021-01-01 RX ADMIN — GABAPENTIN 100 MILLIGRAM(S): 400 CAPSULE ORAL at 11:07

## 2021-01-01 RX ADMIN — CEFTRIAXONE 100 MILLIGRAM(S): 500 INJECTION, POWDER, FOR SOLUTION INTRAMUSCULAR; INTRAVENOUS at 11:21

## 2021-01-01 RX ADMIN — CHLORHEXIDINE GLUCONATE 1 APPLICATION(S): 213 SOLUTION TOPICAL at 06:13

## 2021-01-01 RX ADMIN — Medication 125 MILLIGRAM(S): at 13:13

## 2021-01-01 RX ADMIN — CEFTRIAXONE 100 MILLIGRAM(S): 500 INJECTION, POWDER, FOR SOLUTION INTRAMUSCULAR; INTRAVENOUS at 17:04

## 2021-01-01 RX ADMIN — GABAPENTIN 100 MILLIGRAM(S): 400 CAPSULE ORAL at 05:46

## 2021-01-01 RX ADMIN — DIVALPROEX SODIUM 250 MILLIGRAM(S): 500 TABLET, DELAYED RELEASE ORAL at 14:43

## 2021-01-01 RX ADMIN — HEPARIN SODIUM 5000 UNIT(S): 5000 INJECTION INTRAVENOUS; SUBCUTANEOUS at 05:48

## 2021-01-01 RX ADMIN — Medication 25 MILLIGRAM(S): at 05:47

## 2021-01-01 RX ADMIN — HEPARIN SODIUM 5000 UNIT(S): 5000 INJECTION INTRAVENOUS; SUBCUTANEOUS at 05:29

## 2021-01-01 RX ADMIN — QUETIAPINE FUMARATE 25 MILLIGRAM(S): 200 TABLET, FILM COATED ORAL at 14:34

## 2021-01-01 RX ADMIN — HEPARIN SODIUM 5000 UNIT(S): 5000 INJECTION INTRAVENOUS; SUBCUTANEOUS at 18:42

## 2021-01-01 RX ADMIN — Medication 125 MILLIGRAM(S): at 19:40

## 2021-01-01 RX ADMIN — HEPARIN SODIUM 5000 UNIT(S): 5000 INJECTION INTRAVENOUS; SUBCUTANEOUS at 18:29

## 2021-01-01 RX ADMIN — DIVALPROEX SODIUM 250 MILLIGRAM(S): 500 TABLET, DELAYED RELEASE ORAL at 16:42

## 2021-01-01 RX ADMIN — Medication 81 MILLIGRAM(S): at 11:12

## 2021-01-01 RX ADMIN — Medication 650 MILLIGRAM(S): at 21:23

## 2021-01-01 RX ADMIN — Medication 100 MILLIEQUIVALENT(S): at 06:07

## 2021-01-01 RX ADMIN — Medication 40 MILLIEQUIVALENT(S): at 10:07

## 2021-01-01 RX ADMIN — HEPARIN SODIUM 5000 UNIT(S): 5000 INJECTION INTRAVENOUS; SUBCUTANEOUS at 06:02

## 2021-01-01 RX ADMIN — FENTANYL CITRATE 25 MICROGRAM(S): 50 INJECTION INTRAVENOUS at 12:29

## 2021-01-01 RX ADMIN — AMLODIPINE BESYLATE 5 MILLIGRAM(S): 2.5 TABLET ORAL at 05:47

## 2021-01-01 RX ADMIN — Medication 650 MILLIGRAM(S): at 09:41

## 2021-01-01 RX ADMIN — ENOXAPARIN SODIUM 40 MILLIGRAM(S): 100 INJECTION SUBCUTANEOUS at 11:09

## 2021-01-01 RX ADMIN — LEVETIRACETAM 400 MILLIGRAM(S): 250 TABLET, FILM COATED ORAL at 05:29

## 2021-01-01 RX ADMIN — PIPERACILLIN AND TAZOBACTAM 25 GRAM(S): 4; .5 INJECTION, POWDER, LYOPHILIZED, FOR SOLUTION INTRAVENOUS at 21:04

## 2021-01-01 RX ADMIN — Medication 3 MILLILITER(S): at 20:38

## 2021-01-01 RX ADMIN — QUETIAPINE FUMARATE 50 MILLIGRAM(S): 200 TABLET, FILM COATED ORAL at 01:37

## 2021-01-01 RX ADMIN — Medication 25 MILLIGRAM(S): at 05:28

## 2021-01-01 RX ADMIN — TAMSULOSIN HYDROCHLORIDE 0.8 MILLIGRAM(S): 0.4 CAPSULE ORAL at 21:19

## 2021-01-01 RX ADMIN — FENTANYL CITRATE 25 MICROGRAM(S): 50 INJECTION INTRAVENOUS at 22:49

## 2021-01-01 RX ADMIN — TRAMADOL HYDROCHLORIDE 25 MILLIGRAM(S): 50 TABLET ORAL at 11:23

## 2021-01-01 RX ADMIN — CEFTRIAXONE 100 MILLIGRAM(S): 500 INJECTION, POWDER, FOR SOLUTION INTRAMUSCULAR; INTRAVENOUS at 12:39

## 2021-01-01 RX ADMIN — Medication 125 MILLIGRAM(S): at 06:02

## 2021-01-01 RX ADMIN — SODIUM CHLORIDE 1000 MILLILITER(S): 9 INJECTION INTRAMUSCULAR; INTRAVENOUS; SUBCUTANEOUS at 03:15

## 2021-01-01 RX ADMIN — Medication 125 MILLIGRAM(S): at 18:07

## 2021-01-01 RX ADMIN — HEPARIN SODIUM 5000 UNIT(S): 5000 INJECTION INTRAVENOUS; SUBCUTANEOUS at 18:18

## 2021-01-01 RX ADMIN — CHLORHEXIDINE GLUCONATE 1 APPLICATION(S): 213 SOLUTION TOPICAL at 05:03

## 2021-01-01 RX ADMIN — CHLORHEXIDINE GLUCONATE 1 APPLICATION(S): 213 SOLUTION TOPICAL at 05:28

## 2021-01-01 RX ADMIN — ENOXAPARIN SODIUM 40 MILLIGRAM(S): 100 INJECTION SUBCUTANEOUS at 11:07

## 2021-01-01 RX ADMIN — PIPERACILLIN AND TAZOBACTAM 25 GRAM(S): 4; .5 INJECTION, POWDER, LYOPHILIZED, FOR SOLUTION INTRAVENOUS at 21:28

## 2021-01-01 RX ADMIN — Medication 1 MILLIGRAM(S): at 11:00

## 2021-01-01 RX ADMIN — DIVALPROEX SODIUM 250 MILLIGRAM(S): 500 TABLET, DELAYED RELEASE ORAL at 06:02

## 2021-01-01 RX ADMIN — ATORVASTATIN CALCIUM 40 MILLIGRAM(S): 80 TABLET, FILM COATED ORAL at 21:21

## 2021-01-01 RX ADMIN — Medication 400 MILLIGRAM(S): at 02:25

## 2021-01-01 RX ADMIN — GABAPENTIN 100 MILLIGRAM(S): 400 CAPSULE ORAL at 06:06

## 2021-01-01 RX ADMIN — ATORVASTATIN CALCIUM 40 MILLIGRAM(S): 80 TABLET, FILM COATED ORAL at 21:29

## 2021-01-01 RX ADMIN — Medication 81 MILLIGRAM(S): at 13:13

## 2021-01-01 RX ADMIN — LEVETIRACETAM 400 MILLIGRAM(S): 250 TABLET, FILM COATED ORAL at 18:27

## 2021-01-01 RX ADMIN — FENTANYL CITRATE 25 MICROGRAM(S): 50 INJECTION INTRAVENOUS at 21:05

## 2021-01-01 RX ADMIN — Medication 3 MILLILITER(S): at 20:58

## 2021-01-01 RX ADMIN — ETOMIDATE 20 MILLIGRAM(S): 2 INJECTION INTRAVENOUS at 18:30

## 2021-01-01 RX ADMIN — GABAPENTIN 100 MILLIGRAM(S): 400 CAPSULE ORAL at 05:29

## 2021-01-01 RX ADMIN — HEPARIN SODIUM 5000 UNIT(S): 5000 INJECTION INTRAVENOUS; SUBCUTANEOUS at 17:33

## 2021-01-01 RX ADMIN — AMLODIPINE BESYLATE 5 MILLIGRAM(S): 2.5 TABLET ORAL at 05:41

## 2021-01-01 RX ADMIN — QUETIAPINE FUMARATE 25 MILLIGRAM(S): 200 TABLET, FILM COATED ORAL at 05:28

## 2021-01-01 RX ADMIN — FENTANYL CITRATE 25 MICROGRAM(S): 50 INJECTION INTRAVENOUS at 05:57

## 2021-01-01 RX ADMIN — PREGABALIN 500 MICROGRAM(S): 225 CAPSULE ORAL at 11:05

## 2021-01-01 RX ADMIN — LEVETIRACETAM 400 MILLIGRAM(S): 250 TABLET, FILM COATED ORAL at 17:43

## 2021-01-01 RX ADMIN — POLYETHYLENE GLYCOL 3350 17 GRAM(S): 17 POWDER, FOR SOLUTION ORAL at 12:24

## 2021-01-01 RX ADMIN — Medication 650 MILLIGRAM(S): at 14:18

## 2021-01-01 RX ADMIN — PIPERACILLIN AND TAZOBACTAM 25 GRAM(S): 4; .5 INJECTION, POWDER, LYOPHILIZED, FOR SOLUTION INTRAVENOUS at 05:34

## 2021-01-01 RX ADMIN — Medication 125 MILLIGRAM(S): at 06:07

## 2021-01-01 RX ADMIN — HEPARIN SODIUM 5000 UNIT(S): 5000 INJECTION INTRAVENOUS; SUBCUTANEOUS at 17:30

## 2021-01-01 RX ADMIN — PIPERACILLIN AND TAZOBACTAM 25 GRAM(S): 4; .5 INJECTION, POWDER, LYOPHILIZED, FOR SOLUTION INTRAVENOUS at 13:49

## 2021-01-01 RX ADMIN — QUETIAPINE FUMARATE 50 MILLIGRAM(S): 200 TABLET, FILM COATED ORAL at 21:34

## 2021-01-01 RX ADMIN — QUETIAPINE FUMARATE 100 MILLIGRAM(S): 200 TABLET, FILM COATED ORAL at 21:30

## 2021-01-01 RX ADMIN — HEPARIN SODIUM 5000 UNIT(S): 5000 INJECTION INTRAVENOUS; SUBCUTANEOUS at 05:04

## 2021-01-01 RX ADMIN — ATORVASTATIN CALCIUM 40 MILLIGRAM(S): 80 TABLET, FILM COATED ORAL at 21:27

## 2021-01-01 RX ADMIN — ENOXAPARIN SODIUM 40 MILLIGRAM(S): 100 INJECTION SUBCUTANEOUS at 11:13

## 2021-01-01 RX ADMIN — Medication 25 GRAM(S): at 10:10

## 2021-01-01 RX ADMIN — QUETIAPINE FUMARATE 25 MILLIGRAM(S): 200 TABLET, FILM COATED ORAL at 13:51

## 2021-01-01 RX ADMIN — SENNA PLUS 2 TABLET(S): 8.6 TABLET ORAL at 21:20

## 2021-01-01 RX ADMIN — AMLODIPINE BESYLATE 5 MILLIGRAM(S): 2.5 TABLET ORAL at 21:30

## 2021-01-01 RX ADMIN — Medication 3 MILLILITER(S): at 08:01

## 2021-01-01 RX ADMIN — Medication 25 MILLIGRAM(S): at 06:06

## 2021-01-01 RX ADMIN — GABAPENTIN 100 MILLIGRAM(S): 400 CAPSULE ORAL at 05:44

## 2021-01-01 RX ADMIN — CHLORHEXIDINE GLUCONATE 15 MILLILITER(S): 213 SOLUTION TOPICAL at 17:36

## 2021-01-01 RX ADMIN — QUETIAPINE FUMARATE 25 MILLIGRAM(S): 200 TABLET, FILM COATED ORAL at 05:45

## 2021-01-01 RX ADMIN — FENTANYL CITRATE 25 MICROGRAM(S): 50 INJECTION INTRAVENOUS at 08:30

## 2021-01-01 RX ADMIN — LEVETIRACETAM 400 MILLIGRAM(S): 250 TABLET, FILM COATED ORAL at 05:14

## 2021-01-01 RX ADMIN — Medication 3 MILLILITER(S): at 13:03

## 2021-01-01 RX ADMIN — CEFTRIAXONE 100 MILLIGRAM(S): 500 INJECTION, POWDER, FOR SOLUTION INTRAMUSCULAR; INTRAVENOUS at 10:11

## 2021-01-01 RX ADMIN — ATORVASTATIN CALCIUM 40 MILLIGRAM(S): 80 TABLET, FILM COATED ORAL at 22:37

## 2021-01-01 RX ADMIN — Medication 3 MILLILITER(S): at 19:45

## 2021-01-01 RX ADMIN — CEFTRIAXONE 100 MILLIGRAM(S): 500 INJECTION, POWDER, FOR SOLUTION INTRAMUSCULAR; INTRAVENOUS at 11:15

## 2021-01-01 RX ADMIN — PREGABALIN 500 MICROGRAM(S): 225 CAPSULE ORAL at 12:24

## 2021-01-01 RX ADMIN — ATORVASTATIN CALCIUM 80 MILLIGRAM(S): 80 TABLET, FILM COATED ORAL at 21:13

## 2021-01-01 RX ADMIN — PIPERACILLIN AND TAZOBACTAM 25 GRAM(S): 4; .5 INJECTION, POWDER, LYOPHILIZED, FOR SOLUTION INTRAVENOUS at 06:08

## 2021-01-01 RX ADMIN — TRAMADOL HYDROCHLORIDE 25 MILLIGRAM(S): 50 TABLET ORAL at 01:01

## 2021-01-01 RX ADMIN — CHLORHEXIDINE GLUCONATE 15 MILLILITER(S): 213 SOLUTION TOPICAL at 06:28

## 2021-01-01 RX ADMIN — QUETIAPINE FUMARATE 25 MILLIGRAM(S): 200 TABLET, FILM COATED ORAL at 05:15

## 2021-01-01 RX ADMIN — CHLORHEXIDINE GLUCONATE 1 APPLICATION(S): 213 SOLUTION TOPICAL at 05:51

## 2021-01-01 RX ADMIN — AMLODIPINE BESYLATE 5 MILLIGRAM(S): 2.5 TABLET ORAL at 06:16

## 2021-01-01 RX ADMIN — AMLODIPINE BESYLATE 5 MILLIGRAM(S): 2.5 TABLET ORAL at 06:27

## 2021-01-01 RX ADMIN — PIPERACILLIN AND TAZOBACTAM 25 GRAM(S): 4; .5 INJECTION, POWDER, LYOPHILIZED, FOR SOLUTION INTRAVENOUS at 16:05

## 2021-01-01 RX ADMIN — PREGABALIN 500 MICROGRAM(S): 225 CAPSULE ORAL at 11:01

## 2021-01-01 RX ADMIN — GABAPENTIN 100 MILLIGRAM(S): 400 CAPSULE ORAL at 14:38

## 2021-01-01 RX ADMIN — Medication 1000 MILLIGRAM(S): at 03:37

## 2021-01-01 RX ADMIN — GABAPENTIN 100 MILLIGRAM(S): 400 CAPSULE ORAL at 05:16

## 2021-01-01 RX ADMIN — HEPARIN SODIUM 5000 UNIT(S): 5000 INJECTION INTRAVENOUS; SUBCUTANEOUS at 17:23

## 2021-01-01 RX ADMIN — CHLORHEXIDINE GLUCONATE 1 APPLICATION(S): 213 SOLUTION TOPICAL at 05:05

## 2021-01-01 RX ADMIN — QUETIAPINE FUMARATE 25 MILLIGRAM(S): 200 TABLET, FILM COATED ORAL at 14:33

## 2021-01-01 RX ADMIN — ALTEPLASE 37.3 MILLIGRAM(S): KIT at 18:45

## 2021-01-01 RX ADMIN — Medication 125 MILLIGRAM(S): at 06:42

## 2021-01-01 RX ADMIN — CHLORHEXIDINE GLUCONATE 1 APPLICATION(S): 213 SOLUTION TOPICAL at 05:42

## 2021-01-01 RX ADMIN — ATORVASTATIN CALCIUM 10 MILLIGRAM(S): 80 TABLET, FILM COATED ORAL at 21:29

## 2021-01-01 RX ADMIN — Medication 25 MILLIGRAM(S): at 18:29

## 2021-01-01 RX ADMIN — Medication 125 MILLIGRAM(S): at 00:00

## 2021-01-01 RX ADMIN — FENTANYL CITRATE 25 MICROGRAM(S): 50 INJECTION INTRAVENOUS at 05:02

## 2021-01-01 RX ADMIN — Medication 125 MILLIGRAM(S): at 17:45

## 2021-01-01 RX ADMIN — ATORVASTATIN CALCIUM 40 MILLIGRAM(S): 80 TABLET, FILM COATED ORAL at 21:34

## 2021-01-01 RX ADMIN — Medication 25 MILLIGRAM(S): at 02:45

## 2021-01-01 RX ADMIN — PIPERACILLIN AND TAZOBACTAM 200 GRAM(S): 4; .5 INJECTION, POWDER, LYOPHILIZED, FOR SOLUTION INTRAVENOUS at 12:04

## 2021-01-01 RX ADMIN — CHLORHEXIDINE GLUCONATE 1 APPLICATION(S): 213 SOLUTION TOPICAL at 05:45

## 2021-01-01 RX ADMIN — GABAPENTIN 100 MILLIGRAM(S): 400 CAPSULE ORAL at 14:35

## 2021-01-01 RX ADMIN — SENNA PLUS 2 TABLET(S): 8.6 TABLET ORAL at 21:12

## 2021-01-01 RX ADMIN — ENOXAPARIN SODIUM 40 MILLIGRAM(S): 100 INJECTION SUBCUTANEOUS at 11:21

## 2021-01-01 RX ADMIN — Medication 650 MILLIGRAM(S): at 12:30

## 2021-01-01 RX ADMIN — PIPERACILLIN AND TAZOBACTAM 25 GRAM(S): 4; .5 INJECTION, POWDER, LYOPHILIZED, FOR SOLUTION INTRAVENOUS at 21:30

## 2021-01-01 RX ADMIN — Medication 650 MILLIGRAM(S): at 21:00

## 2021-01-01 RX ADMIN — GABAPENTIN 100 MILLIGRAM(S): 400 CAPSULE ORAL at 21:19

## 2021-01-01 RX ADMIN — TRAMADOL HYDROCHLORIDE 25 MILLIGRAM(S): 50 TABLET ORAL at 12:47

## 2021-01-01 RX ADMIN — Medication 5 MILLIGRAM(S): at 21:29

## 2021-01-01 RX ADMIN — DIVALPROEX SODIUM 250 MILLIGRAM(S): 500 TABLET, DELAYED RELEASE ORAL at 17:30

## 2021-01-01 RX ADMIN — FENTANYL CITRATE 25 MICROGRAM(S): 50 INJECTION INTRAVENOUS at 18:30

## 2021-01-01 RX ADMIN — Medication 81 MILLIGRAM(S): at 12:16

## 2021-01-01 RX ADMIN — Medication 1 MILLIGRAM(S): at 11:13

## 2021-01-06 ENCOUNTER — LABORATORY RESULT (OUTPATIENT)
Age: 76
End: 2021-01-06

## 2021-01-06 ENCOUNTER — OUTPATIENT (OUTPATIENT)
Dept: OUTPATIENT SERVICES | Facility: HOSPITAL | Age: 76
LOS: 1 days | Discharge: HOME | End: 2021-01-06

## 2021-01-06 ENCOUNTER — APPOINTMENT (OUTPATIENT)
Dept: GERIATRICS | Facility: CLINIC | Age: 76
End: 2021-01-06
Payer: MEDICARE

## 2021-01-06 VITALS
WEIGHT: 98 LBS | HEART RATE: 59 BPM | SYSTOLIC BLOOD PRESSURE: 136 MMHG | DIASTOLIC BLOOD PRESSURE: 66 MMHG | OXYGEN SATURATION: 96 % | HEIGHT: 56 IN | TEMPERATURE: 98.7 F | BODY MASS INDEX: 22.04 KG/M2

## 2021-01-06 DIAGNOSIS — G45.9 TRANSIENT CEREBRAL ISCHEMIC ATTACK, UNSPECIFIED: ICD-10-CM

## 2021-01-06 DIAGNOSIS — Q60.0 RENAL AGENESIS, UNILATERAL: ICD-10-CM

## 2021-01-06 DIAGNOSIS — K21.9 GASTRO-ESOPHAGEAL REFLUX DISEASE W/OUT ESOPHAGITIS: ICD-10-CM

## 2021-01-06 DIAGNOSIS — Z60.2 PROBLEMS RELATED TO LIVING ALONE: ICD-10-CM

## 2021-01-06 DIAGNOSIS — I10 ESSENTIAL (PRIMARY) HYPERTENSION: ICD-10-CM

## 2021-01-06 DIAGNOSIS — Z86.79 PERSONAL HISTORY OF OTHER DISEASES OF THE CIRCULATORY SYSTEM: ICD-10-CM

## 2021-01-06 DIAGNOSIS — Z00.00 ENCOUNTER FOR GENERAL ADULT MEDICAL EXAMINATION W/OUT ABNORMAL FINDINGS: ICD-10-CM

## 2021-01-06 PROCEDURE — 99203 OFFICE O/P NEW LOW 30 MIN: CPT | Mod: GC

## 2021-01-06 RX ORDER — DONEPEZIL HYDROCHLORIDE 5 MG/1
5 TABLET ORAL
Qty: 30 | Refills: 3 | Status: DISCONTINUED | COMMUNITY
Start: 2020-11-23 | End: 2021-01-06

## 2021-01-06 RX ORDER — RIFAXIMIN 550 MG/1
550 TABLET ORAL
Qty: 60 | Refills: 0 | Status: ACTIVE | COMMUNITY

## 2021-01-06 RX ORDER — CIMETIDINE 400 MG
400 TABLET ORAL DAILY
Refills: 0 | Status: DISCONTINUED | COMMUNITY
End: 2021-01-06

## 2021-01-06 RX ORDER — AMLODIPINE BESYLATE 5 MG/1
5 TABLET ORAL DAILY
Qty: 30 | Refills: 1 | Status: ACTIVE | COMMUNITY

## 2021-01-06 RX ORDER — ASPIRIN 81 MG
81 TABLET, DELAYED RELEASE (ENTERIC COATED) ORAL DAILY
Refills: 0 | Status: ACTIVE | COMMUNITY

## 2021-01-06 RX ORDER — ATORVASTATIN CALCIUM 40 MG/1
40 TABLET, FILM COATED ORAL DAILY
Qty: 30 | Refills: 0 | Status: ACTIVE | COMMUNITY

## 2021-01-06 RX ORDER — FAMOTIDINE 20 MG/1
20 TABLET ORAL DAILY
Refills: 0 | Status: ACTIVE | COMMUNITY

## 2021-01-06 SDOH — SOCIAL STABILITY - SOCIAL INSECURITY: PROBLEMS RELATED TO LIVING ALONE: Z60.2

## 2021-01-07 DIAGNOSIS — K21.9 GASTRO-ESOPHAGEAL REFLUX DISEASE WITHOUT ESOPHAGITIS: ICD-10-CM

## 2021-01-07 DIAGNOSIS — I10 ESSENTIAL (PRIMARY) HYPERTENSION: ICD-10-CM

## 2021-01-07 DIAGNOSIS — F03.90 UNSPECIFIED DEMENTIA, UNSPECIFIED SEVERITY, WITHOUT BEHAVIORAL DISTURBANCE, PSYCHOTIC DISTURBANCE, MOOD DISTURBANCE, AND ANXIETY: ICD-10-CM

## 2021-01-07 DIAGNOSIS — Q60.0 RENAL AGENESIS, UNILATERAL: ICD-10-CM

## 2021-01-08 PROBLEM — Z60.2 LIVING ALONE: Status: ACTIVE | Noted: 2021-01-08

## 2021-01-08 PROBLEM — G45.9 TIA (TRANSIENT ISCHEMIC ATTACK): Status: RESOLVED | Noted: 2020-11-24 | Resolved: 2021-01-08

## 2021-01-08 PROBLEM — Z86.79 HISTORY OF CORONARY ARTERY DISEASE: Status: RESOLVED | Noted: 2020-11-24 | Resolved: 2021-01-08
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
## 2021-01-08 NOTE — SOCIAL HISTORY
[FreeTextEntry1] : son and niece [FreeTextEntry4] : impaired [No falls in past year] : Patient reported no falls in the past year [Independent] : feeding [Some assistance needed] : walking

## 2021-01-08 NOTE — PHYSICAL EXAM
[General Appearance - Alert] : alert [General Appearance - In No Acute Distress] : in no acute distress [General Appearance - Well-Appearing] : healthy appearing [Sclera] : the sclera and conjunctiva were normal [Normal Oral Mucosa] : normal oral mucosa [Outer Ear] : the ears and nose were normal in appearance [Neck Appearance] : the appearance of the neck was normal [] : the neck was supple [Neck Cervical Mass (___cm)] : no neck mass was observed [Thyroid Diffuse Enlargement] : the thyroid was not enlarged [Respiration, Rhythm And Depth] : normal respiratory rhythm and effort [Exaggerated Use Of Accessory Muscles For Inspiration] : no accessory muscle use [Auscultation Breath Sounds / Voice Sounds] : lungs were clear to auscultation bilaterally [Heart Rate And Rhythm] : heart rate was normal and rhythm regular [Heart Sounds] : normal S1 and S2 [Murmurs] : no murmurs [Edema] : there was no peripheral edema [Bowel Sounds] : normal bowel sounds [Abdomen Soft] : soft [Abdomen Tenderness] : non-tender [Cervical Lymph Nodes Enlarged Posterior Bilaterally] : posterior cervical [Cervical Lymph Nodes Enlarged Anterior Bilaterally] : anterior cervical [No Focal Deficits] : no focal deficits [FreeTextEntry1] : follows simple commends, MMSE done byt neurology
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
## 2021-01-08 NOTE — REVIEW OF SYSTEMS
[Confused] : confusion [Sleep Disturbances] : sleep disturbances [Anxiety] : anxiety [Emotional Problems] : emotional problems [Fever] : no fever [Chills] : no chills [Feeling Poorly] : not feeling poorly [Earache] : no earache [Sore Throat] : no sore throat [Chest Pain] : no chest pain [Palpitations] : no palpitations [Lower Ext Edema] : no lower extremity edema [Shortness Of Breath] : no shortness of breath [Wheezing] : no wheezing [Cough] : no cough [Abdominal Pain] : no abdominal pain [Vomiting] : no vomiting [Constipation] : no constipation [Diarrhea] : no diarrhea [Heartburn] : no heartburn [Dysuria] : no dysuria [Arthralgias] : no arthralgias [Skin Lesions] : no skin lesions [Suicidal] : not suicidal [Change In Personality] : no personality change

## 2021-01-11 ENCOUNTER — EMERGENCY (EMERGENCY)
Facility: HOSPITAL | Age: 76
LOS: 0 days | Discharge: HOME | End: 2021-01-11
Attending: EMERGENCY MEDICINE | Admitting: EMERGENCY MEDICINE
Payer: MEDICARE

## 2021-01-11 ENCOUNTER — NON-APPOINTMENT (OUTPATIENT)
Age: 76
End: 2021-01-11

## 2021-01-11 VITALS
SYSTOLIC BLOOD PRESSURE: 141 MMHG | DIASTOLIC BLOOD PRESSURE: 67 MMHG | TEMPERATURE: 99 F | OXYGEN SATURATION: 99 % | RESPIRATION RATE: 18 BRPM | HEART RATE: 68 BPM

## 2021-01-11 DIAGNOSIS — F03.90 UNSPECIFIED DEMENTIA WITHOUT BEHAVIORAL DISTURBANCE: ICD-10-CM

## 2021-01-11 DIAGNOSIS — F03.90 UNSPECIFIED DEMENTIA, UNSPECIFIED SEVERITY, WITHOUT BEHAVIORAL DISTURBANCE, PSYCHOTIC DISTURBANCE, MOOD DISTURBANCE, AND ANXIETY: ICD-10-CM

## 2021-01-11 DIAGNOSIS — M79.89 OTHER SPECIFIED SOFT TISSUE DISORDERS: ICD-10-CM

## 2021-01-11 DIAGNOSIS — N39.0 URINARY TRACT INFECTION, SITE NOT SPECIFIED: ICD-10-CM

## 2021-01-11 DIAGNOSIS — R44.3 HALLUCINATIONS, UNSPECIFIED: ICD-10-CM

## 2021-01-11 LAB
AFP-TM SERPL-MCNC: 4.2 NG/ML
ALBUMIN SERPL ELPH-MCNC: 4.1 G/DL — SIGNIFICANT CHANGE UP (ref 3.5–5.2)
ALP SERPL-CCNC: 128 U/L — HIGH (ref 30–115)
ALT FLD-CCNC: 17 U/L — SIGNIFICANT CHANGE UP (ref 0–41)
ANION GAP SERPL CALC-SCNC: 8 MMOL/L — SIGNIFICANT CHANGE UP (ref 7–14)
APPEARANCE UR: ABNORMAL
AST SERPL-CCNC: 17 U/L — SIGNIFICANT CHANGE UP (ref 0–41)
BACTERIA # UR AUTO: ABNORMAL
BASOPHILS # BLD AUTO: 0.04 K/UL — SIGNIFICANT CHANGE UP (ref 0–0.2)
BASOPHILS NFR BLD AUTO: 0.5 % — SIGNIFICANT CHANGE UP (ref 0–1)
BILIRUB SERPL-MCNC: 0.6 MG/DL — SIGNIFICANT CHANGE UP (ref 0.2–1.2)
BILIRUB UR-MCNC: NEGATIVE — SIGNIFICANT CHANGE UP
BUN SERPL-MCNC: 14 MG/DL — SIGNIFICANT CHANGE UP (ref 10–20)
CALCIUM SERPL-MCNC: 11 MG/DL — HIGH (ref 8.5–10.1)
CHLORIDE SERPL-SCNC: 105 MMOL/L — SIGNIFICANT CHANGE UP (ref 98–110)
CO2 SERPL-SCNC: 27 MMOL/L — SIGNIFICANT CHANGE UP (ref 17–32)
COLOR SPEC: YELLOW — SIGNIFICANT CHANGE UP
CREAT SERPL-MCNC: 0.8 MG/DL — SIGNIFICANT CHANGE UP (ref 0.7–1.5)
DIFF PNL FLD: NEGATIVE — SIGNIFICANT CHANGE UP
EOSINOPHIL # BLD AUTO: 0.17 K/UL — SIGNIFICANT CHANGE UP (ref 0–0.7)
EOSINOPHIL NFR BLD AUTO: 2.2 % — SIGNIFICANT CHANGE UP (ref 0–8)
EPI CELLS # UR: 3 /HPF — SIGNIFICANT CHANGE UP (ref 0–5)
ETHANOL SERPL-MCNC: <10 MG/DL — SIGNIFICANT CHANGE UP
FOLATE SERPL-MCNC: 3.1 NG/ML
GLUCOSE SERPL-MCNC: 100 MG/DL — HIGH (ref 70–99)
GLUCOSE UR QL: NEGATIVE — SIGNIFICANT CHANGE UP
GRAN CASTS # UR COMP ASSIST: 3 /LPF — HIGH
HCT VFR BLD CALC: 35.9 % — LOW (ref 37–47)
HGB BLD-MCNC: 11.8 G/DL — LOW (ref 12–16)
HYALINE CASTS # UR AUTO: 12 /LPF — HIGH (ref 0–7)
IMM GRANULOCYTES NFR BLD AUTO: 0.4 % — HIGH (ref 0.1–0.3)
KETONES UR-MCNC: NEGATIVE — SIGNIFICANT CHANGE UP
LEUKOCYTE ESTERASE UR-ACNC: ABNORMAL
LYMPHOCYTES # BLD AUTO: 1.85 K/UL — SIGNIFICANT CHANGE UP (ref 1.2–3.4)
LYMPHOCYTES # BLD AUTO: 24.5 % — SIGNIFICANT CHANGE UP (ref 20.5–51.1)
MCHC RBC-ENTMCNC: 30.5 PG — SIGNIFICANT CHANGE UP (ref 27–31)
MCHC RBC-ENTMCNC: 32.9 G/DL — SIGNIFICANT CHANGE UP (ref 32–37)
MCV RBC AUTO: 92.8 FL — SIGNIFICANT CHANGE UP (ref 81–99)
MONOCYTES # BLD AUTO: 0.57 K/UL — SIGNIFICANT CHANGE UP (ref 0.1–0.6)
MONOCYTES NFR BLD AUTO: 7.5 % — SIGNIFICANT CHANGE UP (ref 1.7–9.3)
NEUTROPHILS # BLD AUTO: 4.9 K/UL — SIGNIFICANT CHANGE UP (ref 1.4–6.5)
NEUTROPHILS NFR BLD AUTO: 64.9 % — SIGNIFICANT CHANGE UP (ref 42.2–75.2)
NITRITE UR-MCNC: NEGATIVE — SIGNIFICANT CHANGE UP
NRBC # BLD: 0 /100 WBCS — SIGNIFICANT CHANGE UP (ref 0–0)
PH UR: 6.5 — SIGNIFICANT CHANGE UP (ref 5–8)
PLATELET # BLD AUTO: 225 K/UL — SIGNIFICANT CHANGE UP (ref 130–400)
POTASSIUM SERPL-MCNC: 4.1 MMOL/L — SIGNIFICANT CHANGE UP (ref 3.5–5)
POTASSIUM SERPL-SCNC: 4.1 MMOL/L — SIGNIFICANT CHANGE UP (ref 3.5–5)
PROT SERPL-MCNC: 7 G/DL — SIGNIFICANT CHANGE UP (ref 6–8)
PROT UR-MCNC: SIGNIFICANT CHANGE UP
RBC # BLD: 3.87 M/UL — LOW (ref 4.2–5.4)
RBC # FLD: 14 % — SIGNIFICANT CHANGE UP (ref 11.5–14.5)
RBC CASTS # UR COMP ASSIST: 0 /HPF — SIGNIFICANT CHANGE UP (ref 0–4)
SARS-COV-2 IGG SERPL IA-ACNC: <0.1 INDEX
SARS-COV-2 IGG SERPL QL IA: NEGATIVE
SODIUM SERPL-SCNC: 140 MMOL/L — SIGNIFICANT CHANGE UP (ref 135–146)
SP GR SPEC: 1.01 — SIGNIFICANT CHANGE UP (ref 1.01–1.03)
TSH SERPL-ACNC: 1.24 UIU/ML
UROBILINOGEN FLD QL: SIGNIFICANT CHANGE UP
VIT B12 SERPL-MCNC: 212 PG/ML
WBC # BLD: 7.56 K/UL — SIGNIFICANT CHANGE UP (ref 4.8–10.8)
WBC # FLD AUTO: 7.56 K/UL — SIGNIFICANT CHANGE UP (ref 4.8–10.8)
WBC UR QL: 113 /HPF — HIGH (ref 0–5)

## 2021-01-11 PROCEDURE — 93970 EXTREMITY STUDY: CPT | Mod: 26

## 2021-01-11 PROCEDURE — 93010 ELECTROCARDIOGRAM REPORT: CPT

## 2021-01-11 PROCEDURE — 90792 PSYCH DIAG EVAL W/MED SRVCS: CPT | Mod: GC

## 2021-01-11 PROCEDURE — 99284 EMERGENCY DEPT VISIT MOD MDM: CPT

## 2021-01-11 PROCEDURE — 70450 CT HEAD/BRAIN W/O DYE: CPT | Mod: 26

## 2021-01-11 RX ORDER — CEFTRIAXONE 500 MG/1
1000 INJECTION, POWDER, FOR SOLUTION INTRAMUSCULAR; INTRAVENOUS ONCE
Refills: 0 | Status: COMPLETED | OUTPATIENT
Start: 2021-01-11 | End: 2021-01-11

## 2021-01-11 RX ORDER — CEFPODOXIME PROXETIL 100 MG
1 TABLET ORAL
Qty: 20 | Refills: 0
Start: 2021-01-11 | End: 2021-01-20

## 2021-01-11 RX ADMIN — CEFTRIAXONE 100 MILLIGRAM(S): 500 INJECTION, POWDER, FOR SOLUTION INTRAMUSCULAR; INTRAVENOUS at 18:18

## 2021-01-11 NOTE — ED PROVIDER NOTE - CARE PROVIDER_API CALL
Brien Lamb)  Neuromuscular Medicine  [address] [place] [address], [address]  [place], [place] [id number]  Phone: [phone]  Fax: [phone]  Follow Up Time:

## 2021-01-11 NOTE — ED ADULT TRIAGE NOTE - CHIEF COMPLAINT QUOTE
pt having hallucinations, was seen in clinic on 1/6 started on Trazadone but unable to start Seroquel yet which was prescribed for the hallucinations, son reports  left leg swelling and increase agitation at home. patient hasn't slept for 3 days    qi (son) [phone]

## 2021-01-11 NOTE — ED BEHAVIORAL HEALTH ASSESSMENT NOTE - RISK ASSESSMENT
pt is at increased risk due to her inability to safety plan, cognitive decline, visual hallucinations, insomnia, age, and visual hallucinations. This is mitigated by her strong social supports, her engagement with treatment, her lack of suicidality, and lack of past attempts Low Acute Suicide Risk

## 2021-01-11 NOTE — ED PROVIDER NOTE - PATIENT PORTAL LINK FT
You can access the FollowMyHealth Patient Portal offered by [facility] by registering at the following website: [email or web address]. By joining [facility]’s FollowMyHealth portal, you will also be able to view your health information using other applications (apps) compatible with our system.

## 2021-01-11 NOTE — ED ADULT NURSE NOTE - CHIEF COMPLAINT QUOTE
pt having hallucinations, was seen in clinic on 1/6 started on Trazadone but unable to start Seroquel yet which was prescribed for the hallucinations, son reports  left leg swelling and increase agitation at home. patient hasn't slept for 3 days    qi (son) [phone]

## 2021-01-11 NOTE — ED PROVIDER NOTE - OBJECTIVE STATEMENT
75y F pmh dementia presents for eval of hallucinations. As per son pt was recently diagnosed with dementia and has been taking haldol and ativan for agitations. She was started on trazodone x3 days ago and since has not slept with hallucinations of animals coming out of the walls. Son endorses RLE swelling. Denies fever, cough, hematuria, n/v

## 2021-01-11 NOTE — ED BEHAVIORAL HEALTH ASSESSMENT NOTE - HPI (INCLUDE ILLNESS QUALITY, SEVERITY, DURATION, TIMING, CONTEXT, MODIFYING FACTORS, ASSOCIATED SIGNS AND SYMPTOMS)
Pt is a 74yo  female, domiciled in [place] with her niece, retired , 8th grade graduate, with no past psychiatric history, no IPP admissions, no suicide attempts, with past medical history of HTN, TIA, and recent diagnosis of dementia, brought in by her son for worsening hallucinations and agitation. Psychiatry consulted to evaluate hallucinations. Upon approach the pt was lying in a hospital bed in the ED hallway with her son at bedside. The pt was awake and alert, recognized she was in a hospital but didn't know which one, stating the year is 1991, and saying she is in the hospital because of her leg. When asked what is happening she states "when someone is close to dying, the spirits visit them". When asked what she has been seeing in the home she states "I saw a snake" ".. its not that big" "..Its not there to hurt me, just visit". She denies any trouble with sleep or appetite. She denies feeling depressed or paranoid. She denied any thought of killing herself or other people. She denied hearing voices. The pt was unable to recall any words out of 3 at five minutes.     Collateral obtained from the pt's son Jose, [phone]. He states that the pt has been having memory problems and hallucinations for 4 years, and its been progressively getting worse, but that it has never been this severe. He reports that it only happens at night. That she sees people in the house and starts yelling at them to get out and bangs her cane on the wall. She also sees animals and gets just as distressed. He reports that she had a severe episode in november 2020 where she was screaming so they went to a neurologist that diagnosed her with dementia and prescribed Donepezil. He reports her hallucinations and agitation worsening on that medication so it was promptly stopped. After that the pt was still symptomatic but not as agitated so they left it alone. He reports the pt was still not sleeping to they went to a geriatrician who prescribed Trazodone, Ativan, and Seroquel. He reports the pt has been getting trazodone and ativan for 3 days, but he has not started the seroquel. He reports that the pt got worse on this medication, saying "her hallucinations and agitation have tripled". Pt is a 76yo  female, domiciled in [place] with her niece, retired , 8th grade graduate, with no past psychiatric history, no IPP admissions, no suicide attempts, with past medical history of HTN, TIA, , sp leg amputation, and recent diagnosis of dementia, brought in by her son for worsening hallucinations and agitation. Psychiatry consulted to evaluate hallucinations. Upon approach the pt was lying in a hospital bed in the ED hallway with her son at bedside. The pt was awake and alert, recognized she was in a hospital but didn't know which one, stating the year is 1991, and saying she is in the hospital because of her leg. When asked what is happening she states "when someone is close to dying, the spirits visit them". When asked what she has been seeing in the home she states "I saw a snake" ".. its not that big" "..Its not there to hurt me, just visit". She denies any trouble with sleep or appetite. She denies feeling depressed or paranoid. She denied any thought of killing herself or other people. She denied hearing voices. The pt was unable to recall any words out of 3 at five minutes.     Collateral obtained from the pt's son Jose, [phone]. He states that the pt has been having memory problems and hallucinations for 4 years, and its been progressively getting worse, but that it has never been this severe. He reports that it only happens at night. That she sees people in the house and starts yelling at them to get out and bangs her cane on the wall. She also sees animals and gets just as distressed. He reports that she had a severe episode in november 2020 where she was screaming so they went to a neurologist that diagnosed her with dementia and prescribed Donepezil. He reports her hallucinations and agitation worsening on that medication so it was promptly stopped. After that the pt was still symptomatic but not as agitated so they left it alone. He reports the pt was still not sleeping to they went to a geriatrician who prescribed Trazodone, Ativan, and Seroquel. He reports the pt has been getting trazodone and ativan for 3 days, but he has not started the seroquel. He reports that the pt got worse on this medication, saying "her hallucinations and agitation have tripled". Pt is a 76yo  female, domiciled in [place] with her niece, retired , 8th grade graduate, with no past psychiatric history, no IPP admissions, no suicide attempts, with past medical history of HTN, TIA, , sp leg amputation, and recent diagnosis of dementia, brought in by her son for worsening hallucinations and agitation. Psychiatry consulted to evaluate hallucinations. Upon approach the pt was lying in a hospital bed in the ED hallway with her son at bedside. The pt was awake and alert, recognized she was in a hospital but didn't know which one, stating the year is 1991, and saying she is in the hospital because of her leg. When asked what is happening she states "when someone is close to dying, the spirits visit them". When asked what she has been seeing in the home she states "I saw a snake" ".. its not that big" "..Its not there to hurt me, just visit". She denies any trouble with sleep or appetite. She denies feeling depressed or paranoid. She denied any thought of killing herself or other people. She denied hearing voices. The pt was unable to recall any words out of 3 at five minutes.     Collateral obtained from the pt's son Jose, [phone]. He states that the pt has been having memory problems and hallucinations for 4 years, and its been progressively getting worse, but that it has never been this severe. He reports that it only happens at night. That she sees people in the house and starts yelling at them to get out and bangs her cane on the wall. She also sees animals and gets just as distressed. He reports that she had a severe episode in november 2020 where she was screaming so they went to a neurologist that diagnosed her with dementia and prescribed Donepezil. He reports her hallucinations and agitation worsening on that medication so it was promptly stopped. After that the pt was still symptomatic but not as agitated so they left it alone. He reports the pt was still not sleeping to they went to a geriatrician who prescribed Trazodone, Ativan, and Seroquel. He reports the pt has been getting trazodone and ativan for 3 days, but he has not started the seroquel. He reports that the pt got worse on this medication, saying "her hallucinations and agitation have tripled". He reports that the pt did not sleep for 3 days, and slept last night for 3 hours. He reports that today she suddenly was unable to walk and the pt's doctor recommended coming to the ED. He reports the pt's biggest issue is her inability to sleep.

## 2021-01-11 NOTE — ED PROVIDER NOTE - CARE PLAN
Principal Discharge DX:	UTI (urinary tract infection)   Principal Discharge DX:	UTI (urinary tract infection)  Secondary Diagnosis:	Dementia  Secondary Diagnosis:	Leg swelling

## 2021-01-11 NOTE — ED BEHAVIORAL HEALTH ASSESSMENT NOTE - CASE SUMMARY
74 yo  female with history of HTN, TIA  and recent diagnosis of dementia, who presented here at the request of physician for ongoing behavioral disturbance and worsening visual hallucination at home.  Reportedly patient was diagnosed with dementia recently and was started on donepezil which worsened her condition. She was then put on prn Ativan along with standing trazodone at night, which did not provide positive therapeutic effect.  Bhutanese speaking female is seen calm and cooperative during the interview, with poor cognition as noted by her delayed recall of 0 out of 3. Collateral information revealed worsening episode of both agitation and psychosis at night, which can be component of both delirium with superimposed dementia. At this time diagnosis remains questionable Alzheimer disease vs Lewy body dementia vs vascular dementia (least likely).  CT of head is pending along with other dementia work up and EKG. Recommendation is for patient to continue home medication Seroquel that was prescribed which has not yet  started. She is recommended to take 25mg po hs of Seroquel and if not enough dose can be titrated to 50mg po hs. Patient’s son is advised to follow up with a sophia psychiatrist. Referral will be sent to [facility] outpatient psychiatry service. Patient and her family will benefit from social work referral while in ED.

## 2021-01-11 NOTE — ED PROVIDER NOTE - CLINICAL SUMMARY MEDICAL DECISION MAKING FREE TEXT BOX
pt here with worsening dementia, cleared by psych. screening head ct and labs wnl. found to have UTI.  offered admission  but son adamant that patient should be taken home, will have someone stay with the patient. Strict return precautions provided.

## 2021-01-11 NOTE — ED PROVIDER NOTE - PROGRESS NOTE DETAILS
ATTENDING NOTE: 76 y/o F here with son for evaluation of hallucinations. Pt’s son states Pt was diagnosed with dementia 3 days ago, was started on Haldol and PRN Ativan. Since taking the Trazodone Pt has not slept in 3 days and is now hallucinating, seeing animals, snakes and people coming out of her her. Her son also reports R leg swelling. No trauma, or fevers. On exam: On exam: CON: Calm, cooperative, knows name,  and location in ED. HENMT: clear oropharynx, neck supple, (+) PERRL, EOMI, (+) B/L upper lid lag which is baseline for the Pt but may be worse according to son, CV: rrr, equal pulses b/l, RESP: cta b/l, GI:  soft, nontender, no rebound, no guarding, SKIN: no rash, MSK: (+) R leg swelling, distal pulses intact, (+) L leg above knee amputation. NEURO: Moving all EXT, motor function intact. Impression: Worsening dementia now with hallucinations. Will obtain head CT, Screening labs, EKG and consult psych. Will also obtain duplex for LE swelling. ATTENDING NOTE: 74 y/o F here with son for evaluation of hallucinations. Pt’s son states Pt was diagnosed with dementia 3 days ago, was started on Haldol and PRN Ativan. Since taking the Trazodone Pt has not slept in 3 days and is now hallucinating, seeing animals, snakes and people coming out of her her. Her son also reports R leg swelling. No trauma, or fevers. On exam: On exam: CON: Calm, cooperative, knows name,  and location in ED. HENMT: clear oropharynx, neck supple, (+) PERRL, EOMI, (+) B/L upper lid lag which is baseline for the Pt but may be worse according to son, CV: rrr, equal pulses b/l, RESP: cta b/l, GI:  soft, nontender, no rebound, no guarding, SKIN: no rash, MSK: (+) R leg swelling, distal pulses intact, ( no redness or warmth to leg +) L leg above knee amputation. NEURO: Moving all EXT, motor function intact. Impression: Worsening dementia now with hallucinations. Will obtain head CT, Screening labs, EKG and consult psych. Will also obtain duplex for LE swelling.

## 2021-01-11 NOTE — ED PROVIDER NOTE - PHYSICAL EXAMINATION
CONST: NAD  EYES: Sclera and conjunctiva clear.   ENT: No nasal discharge. Oropharynx normal appearing  NECK: Non-tender, no meningeal signs. normal ROM. supple   CARD: S1 S2; No jvd  RESP: Equal BS B/L, No wheezes, rhonchi or rales. No distress  GI: Soft, non-tender, non-distended. no cva tenderness. normal BS  MS: Normal ROM in all extremities. pulses 2 +. no calf tenderness or swelling  SKIN: Warm, dry, no acute rashes. Good turgor  NEURO: A&Ox0, No focal deficits. Strength 5/5 with no sensory deficits.

## 2021-01-11 NOTE — ED PROVIDER NOTE - NSFOLLOWUPINSTRUCTIONS_ED_ALL_ED_FT
Follow up with PMD and Neurology in 1-2 days.    Urinary Tract Infection    A urinary tract infection (UTI) is an infection of any part of the urinary tract, which includes the kidneys, ureters, bladder, and urethra. Risk factors include ignoring your need to urinate, wiping back to front if female, being an uncircumcised male, and having diabetes or a weak immune system. Symptoms include frequent urination, pain or burning with urination, foul smelling urine, cloudy urine, pain in the lower abdomen, blood in the urine, and fever. If you were prescribed an antibiotic medicine, take it as told by your health care provider. Do not stop taking the antibiotic even if you start to feel better.    SEEK IMMEDIATE MEDICAL CARE IF YOU HAVE ANY OF THE FOLLOWING SYMPTOMS: severe back or abdominal pain, fever, inability to keep fluids or medicine down, dizziness/lightheadedness, or a change in mental status.

## 2021-01-11 NOTE — ED ADULT NURSE NOTE - NSIMPLEMENTINTERV_GEN_ALL_ED
Implemented All Fall with Harm Risk Interventions:  [place] to call system. Call bell, personal items and telephone within reach. Instruct patient to call for assistance. Room bathroom lighting operational. Non-slip footwear when patient is off stretcher. Physically safe environment: no spills, clutter or unnecessary equipment. Stretcher in lowest position, wheels locked, appropriate side rails in place. Provide visual cue, wrist band, yellow gown, etc. Monitor gait and stability. Monitor for mental status changes and reorient to person, place, and time. Review medications for side effects contributing to fall risk. Reinforce activity limits and safety measures with patient and family. Provide visual clues: red socks.

## 2021-01-11 NOTE — ED BEHAVIORAL HEALTH ASSESSMENT NOTE - SUMMARY
Pt is a 74yo  female, domiciled in [place] with her niece, retired , 8th grade graduate, with no past psychiatric history, no IPP admissions, no suicide attempts, with past medical history of HTN, TIA, , sp leg amputation, and recent diagnosis of dementia, brought in by her son for worsening hallucinations and agitation. Psychiatry consulted to evaluate hallucinations. The pt has a progressive decline in memory and worsening of her hallucinations. This is likely to be due to a worsening of her cognitive deficits, lewy body vs alzheimers, and less likely delirium, due to the sudden worsening of her symptoms and sudden inability to ambulate. The pt's family has been advised to stop the medications that make her worse: Ativan, trazodone, donepezil. And they have been advised to give the prescribed  outpatient medication seroquel 25mg qhs. The pt is not acutely suicidal, homicidal, depressed, manic, or psychotic, and does not warrant IPP admission at this time. The pt can be provided with a referral to [facility] outpatient psychiatry if they would like to continue seeing a [facility] psychiatrist to treat their insomnia.    Recommendations:  - Delirium workup including but not limited to: TSH, B12, Folate, RPR, UA, Ammonia, Brain MRI  - Consult Neurology  - Stop Ativan and trazodone  - Continue giving Seroquel 25mg qhs  - Consult social work to provide outpatient and housing resources    - Provide resources for [facility] outpatient psychiatry:  [address] [place] [address]   [phone]

## 2021-01-11 NOTE — ED PROVIDER NOTE - NS ED ROS FT
Constitutional: (-) fever  Eyes/ENT: (-) blurry vision, (-) epistaxis  Cardiovascular: (-) chest pain, (-) syncope  Respiratory: (-) cough, (-) shortness of breath  Gastrointestinal: (-) vomiting, (-) diarrhea  Musculoskeletal: (-) neck pain, (-) back pain, (-) joint pain  Integumentary: (+) swelling, (-) rash  Neurological: (-) headache, (-) altered mental status  Psychiatric: (+) hallucinations  Allergic/Immunologic: (-) pruritus

## 2021-01-11 NOTE — ED BEHAVIORAL HEALTH ASSESSMENT NOTE - DESCRIPTION
pt had CT and blood work, psychiatry consulted HTN, TIA domiciled in [place] with her niece, retired , 8th grade graduate

## 2021-01-15 LAB
CULTURE RESULTS: SIGNIFICANT CHANGE UP
SPECIMEN SOURCE: SIGNIFICANT CHANGE UP

## 2021-01-22 ENCOUNTER — APPOINTMENT (OUTPATIENT)
Dept: NEUROLOGY | Facility: CLINIC | Age: 76
End: 2021-01-22
Payer: MEDICARE

## 2021-01-22 VITALS
OXYGEN SATURATION: 99 % | TEMPERATURE: 97.2 F | HEART RATE: 64 BPM | SYSTOLIC BLOOD PRESSURE: 150 MMHG | DIASTOLIC BLOOD PRESSURE: 75 MMHG | HEIGHT: 59 IN | WEIGHT: 98 LBS | BODY MASS INDEX: 19.76 KG/M2

## 2021-01-22 PROCEDURE — 99214 OFFICE O/P EST MOD 30 MIN: CPT

## 2021-01-22 NOTE — HISTORY OF PRESENT ILLNESS
[FreeTextEntry1] : DANISHA JIMÉNEZ is a 75 year old woman is here for follow up visit for dementia. She was last seen as a new patient on November 23rd for memory and cognition decline. MOCA score was 11/30. She was started on Aricept and Seroquel.  She got more agitated and her hallucination got worse. She eventually started Seroquel 25 mg qhs. On January she was seen by Geriatrician and was started on trazodone and lorazepam but per her son she also got worse on those medications. She was taken to hospital on January 11th where CT head showed mild progression of cerebral atrophy and periventricular white matter, subcortical white matter, and internal and external capsule hypo densities.\par Today her son stated that her sleep is a little better with Seroquel and Melatonin. She still gets hallucinations at night. [place] Her nephew and her son visit him every night. She just finished a antibiotic course for UTI. \par

## 2021-01-22 NOTE — REVIEW OF SYSTEMS
[Confused or Disoriented] : confusion [Memory Lapses or Loss] : memory loss [Decr. Concentrating Ability] : decreased concentrating ability [Changed Thought Patterns] : changed thought patterns [Difficulty with Language] : ~M difficulty with language [Repeating Questions] : repeated questioning about recent events [Negative] : Integumentary

## 2021-01-22 NOTE — ASSESSMENT
[FreeTextEntry1] : DANISHA NAVARRETE is a 75 year old woman with history of hypertension, TIA, CVA and CAD is here for follow up visit.  Her MOCA score is 11/20 checked in the last visit. She could not tolerate Aricept, lorazepam and trazodone. Her sleep is better on Seroquel and melatonin. \par \par # AD/Dementia: \par - Cw Seroquel 25 mg qhs \par - Brain MRI w/o contrast checking for degree of atrophy and vascular dementia \par - Revisit in 8 weeks to consider increasing Seroquel \par \par # Vitamin B12 deficiency \par - Recommended to contact PCP to start vitamin B12 injections \par #TIA/CVA \par - Continue ASA and Lipitor \par \par # History of unresponsiveness \par - Not on AED\par - Continue to monitor.

## 2021-01-22 NOTE — PHYSICAL EXAM
[FreeTextEntry1] : Mental status: Awake, alert and oriented x1. Poor Recent and remote memory intact. Naming and repetition are intact. Poor Attention/concentration. \par Cranial nerves: Pupils equally round and reactive to light, visual fields full, no nystagmus, extraocular muscles intact, V1 through V3 intact bilaterally and symmetric, face symmetric, hearing intact to finger rub, palate elevation symmetric, tongue was midline.\par Motor: MRC grading 5/5 b/l UE/LE.  strength 5/5 except the LLE( amputated) \par Sensation: Intact to light touch\par Coordination: No dysmetria on finger-to-nose\par Reflexes: 2+ in bilateral UE\par Gait:deferred

## 2021-02-25 ENCOUNTER — LABORATORY RESULT (OUTPATIENT)
Age: 76
End: 2021-02-25

## 2021-02-26 ENCOUNTER — RX RENEWAL (OUTPATIENT)
Age: 76
End: 2021-02-26

## 2021-02-26 LAB
APPEARANCE: ABNORMAL
BILIRUBIN URINE: NEGATIVE
BLOOD URINE: ABNORMAL
COLOR: YELLOW
GLUCOSE QUALITATIVE U: NEGATIVE
KETONES URINE: NEGATIVE
LEUKOCYTE ESTERASE URINE: ABNORMAL
NITRITE URINE: NEGATIVE
PH URINE: 6.5
PROTEIN URINE: ABNORMAL
SPECIFIC GRAVITY URINE: 1.01
UROBILINOGEN URINE: NORMAL

## 2021-02-26 RX ORDER — METOPROLOL TARTRATE 25 MG/1
25 TABLET, FILM COATED ORAL TWICE DAILY
Qty: 60 | Refills: 0 | Status: ACTIVE | COMMUNITY
Start: 2021-02-26 | End: 1900-01-01

## 2021-03-02 ENCOUNTER — NON-APPOINTMENT (OUTPATIENT)
Age: 76
End: 2021-03-02

## 2021-03-02 LAB
APPEARANCE: ABNORMAL
BILIRUBIN URINE: NEGATIVE
BLOOD URINE: ABNORMAL
COLOR: YELLOW
GLUCOSE QUALITATIVE U: NEGATIVE
KETONES URINE: NEGATIVE
LEUKOCYTE ESTERASE URINE: ABNORMAL
NITRITE URINE: NEGATIVE
PH URINE: 7
PROTEIN URINE: ABNORMAL
SPECIFIC GRAVITY URINE: 1.01
UROBILINOGEN URINE: NORMAL

## 2021-03-03 RX ORDER — ACETAMINOPHEN/DIPHENHYDRAMINE 500MG-25MG
1000 TABLET ORAL DAILY
Qty: 90 | Refills: 5 | Status: ACTIVE | COMMUNITY
Start: 2021-03-02 | End: 1900-01-01

## 2021-06-22 NOTE — REVIEW OF SYSTEMS
[Memory Lapses or Loss] : memory loss [Changed Thought Patterns] : changed thought patterns [Repeating Questions] : repeated questioning about recent events [Sleep Disturbances] : sleep disturbances [Emotional Problems] : emotional problems [Negative] : Heme/Lymph

## 2021-06-22 NOTE — PHYSICAL EXAM
[FreeTextEntry1] : Mental status: Awake, alert and oriented x1. Poor Recent and remote memory intact. Naming and repetition are intact. Poor Attention/concentration. \par Cranial nerves: Pupils equally round and reactive to light, visual fields full, no nystagmus, extraocular muscles intact, V1 through V3 intact bilaterally and symmetric, face symmetric, hearing intact to finger rub, palate elevation symmetric, tongue was midline.\par Motor: MRC grading 5/5 b/l UE/LE.  strength 5/5 except the LLE( amputated) \par Sensation: Intact to light touch\par Coordination: No dysmetria on finger-to-nose\par Reflexes: 2+ in bilateral UE\par

## 2021-06-22 NOTE — HISTORY OF PRESENT ILLNESS
[FreeTextEntry1] : DANISHA JIMÉNEZ is a 75 year old woman is here as a follow up visit for dementia, cognitive decline and behavioral changes. \par She was last seen on Jan 2021. Her MOCA score was 11/30 at the first visit.  She was started on Aricept and Seroquel. She got more agitated and her hallucination got worse. She eventually started Seroquel 25 mg qhs. On January she was seen by Geriatrician and was started on trazodone and lorazepam but per her son she also got worse on those medications. She was taken to hospital on January 11th where CT head showed mild progression of cerebral atrophy and periventricular white matter, subcortical white matter, and internal and external capsule hypo densities. Brain MRI is still pending. Aricept was stopped due to side effect of worsening agitation. Today she is accompanied by her son. She reports visual hallucinations as she see water falling from the wall. She sometimes sees people on the wall. She has hard time falling sleep and sometimes stays on the bed before falling sleep.

## 2021-06-22 NOTE — ASSESSMENT
[FreeTextEntry1] : DANISHA NAVARRETE is a 75 year old woman with history of hypertension, TIA, CVA and CAD is here for follow up visit. Her diagnosis is most likely Alzheimer disease \par \par # AD/Dementia: \par - Increase Seroquel to 50 mg qhs and add 25 mg duting the day \par - Add Lexapro 10 mg \par - Brain MRI w/o contrast checking for degree of atrophy and vascular dementia \par - Revisit in 3-4 months \par \par # Vitamin B12 deficiency \par -Currently on PO supplement. Check level \par \par #TIA/CVA \par - Continue ASA and Lipitor \par \par # History of unresponsiveness \par - Not on AED\par - Routine EEG
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
## 2021-06-25 NOTE — H&P ADULT - NSHPLABSRESULTS_GEN_ALL_CORE
13.0   7.75  )-----------( 225      ( 25 Jun 2021 15:55 )             40.7       06-25    141  |  108  |  15  ----------------------------<  100<H>  4.0   |  25  |  0.8    Ca    10.6<H>      25 Jun 2021 15:55  Mg     1.9     06-25    TPro  7.7  /  Alb  4.5  /  TBili  0.5  /  DBili  x   /  AST  15  /  ALT  14  /  AlkPhos  202<H>  06-25

## 2021-06-25 NOTE — ED ADULT NURSE NOTE - NSIMPLEMENTINTERV_GEN_ALL_ED
Implemented All Fall Risk Interventions:  [place] to call system. Call bell, personal items and telephone within reach. Instruct patient to call for assistance. Room bathroom lighting operational. Non-slip footwear when patient is off stretcher. Physically safe environment: no spills, clutter or unnecessary equipment. Stretcher in lowest position, wheels locked, appropriate side rails in place. Provide visual cue, wrist band, yellow gown, etc. Monitor gait and stability. Monitor for mental status changes and reorient to person, place, and time. Review medications for side effects contributing to fall risk. Reinforce activity limits and safety measures with patient and family.
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
## 2021-06-25 NOTE — ED PROVIDER NOTE - NS ED ROS FT
Constitutional: (+) falls, (-) fever  Eyes/ENT: (-) blurry vision, (-) epistaxis  Cardiovascular: (-) chest pain, (-) syncope  Respiratory: (-) cough, (-) shortness of breath  Gastrointestinal: (-) vomiting, (-) diarrhea  Musculoskeletal: (-) neck pain, (-) back pain, (-) joint pain  Integumentary: (-) rash, (-) edema  Neurological: (-) headache, (-) altered mental status  Psychiatric: (+) aggression, (-) hallucinations  Allergic/Immunologic: (-) pruritus

## 2021-06-25 NOTE — ED PROVIDER NOTE - ATTENDING CONTRIBUTION TO CARE
75 yr old f w/ a pmh significant for dementia, htn, who presents s/p fall and worsening mental status. As per son, yesterday pt had a mechanical fall and landed on the ottoman. Pt did not hit her head, had LOC, or any other complaitns. Pts son also states that she has also been more aggressive than usual. Pt and son deny any other medical complaints.     VITAL SIGNS: I have reviewed nursing notes and confirm.  CONSTITUTIONAL: non-toxic, well appearing  SKIN: no rash, no petechiae.  EYES: EOMI, pink conjunctiva, anicteric  ENT: tongue midline, no exudates, MMM  NECK: Supple; no meningismus, no JVD  CARD: RRR, no murmurs, equal radial pulses bilaterally 2+  RESP: CTAB, no respiratory distress  ABD: Soft, non-tender, non-distended, no peritoneal signs, no HSM, no CVA tenderness  EXT: Normal ROM x3. No edema. No calves tenderness  NEURO: Alert, oriented. CN2-12 intact, equal strength bilaterally.  PSYCH: Cooperative, appropriate.    a/p  75 yr old f that presents with worsenign mental status and fall   -labs  -ekg  -ua  -imaging  -consider admission

## 2021-06-25 NOTE — ED ADULT TRIAGE NOTE - PATIENT ON (OXYGEN DELIVERY METHOD)
Renal-Progress Notes


Subjective Notes


Notes


NONE





History of Present Illness


Hx of present illness


NO CHANGE





Vitals


Vitals





 Vital Signs








  Date Time  Temp Pulse Resp B/P Pulse Ox O2 Delivery O2 Flow Rate FiO2


 


2/27/17 08:00      Room Air  


 


2/27/17 07:20 98.7 65 16 137/51 99   





 98.7       


 


2/26/17 20:00       2.0 








Weight


Weight [ ]





I.O.


Intake and Output











 Intake and Output 


 


 2/27/17





 06:59


 


Intake Total 1450 ml


 


Balance 1450 ml


 


 


 


Intake Oral 1450 ml


 


# Voids 2











Labs


Labs





Laboratory Tests








Test


  2/26/17


17:03 2/26/17


20:51 2/27/17


04:50 2/27/17


07:45


 


Glucose (Fingerstick)


  219mg/dL


(70-99) 276mg/dL


(70-99) 


  154mg/dL


(70-99)


 


White Blood Count


  


  


  3.9x10^3/uL


(4.0-11.0) 


 


 


Red Blood Count


  


  


  2.48x10^6/uL


(3.50-5.40) 


 


 


Hemoglobin


  


  


  7.6g/dL


(12.0-15.5) 


 


 


Hematocrit


  


  


  22.8%


(36.0-47.0) 


 


 


Mean Corpuscular Volume   92fL ()  


 


Mean Corpuscular Hemoglobin   31pg (25-35)  


 


Mean Corpuscular Hemoglobin


Concent 


  


  33g/dL (31-37) 


  


 


 


Red Cell Distribution Width


  


  


  13.9%


(11.5-14.5) 


 


 


Platelet Count


  


  


  174x10^3/uL


(140-400) 


 


 


Neutrophils (%) (Auto)   48% (31-73)  


 


Lymphocytes (%) (Auto)   36% (24-48)  


 


Monocytes (%) (Auto)   10% (0-9)  


 


Eosinophils (%) (Auto)   6% (0-3)  


 


Basophils (%) (Auto)   1% (0-3)  


 


Neutrophils # (Auto)


  


  


  1.9x10^3uL


(1.8-7.7) 


 


 


Lymphocytes # (Auto)


  


  


  1.4x10^3/uL


(1.0-4.8) 


 


 


Monocytes # (Auto)


  


  


  0.4x10^3/uL


(0.0-1.1) 


 


 


Eosinophils # (Auto)


  


  


  0.2x10^3/uL


(0.0-0.7) 


 


 


Basophils # (Auto)


  


  


  0.0x10^3/uL


(0.0-0.2) 


 


 


Sodium Level


  


  


  140mmol/L


(136-145) 


 


 


Potassium Level


  


  


  3.8mmol/L


(3.5-5.1) 


 


 


Chloride Level


  


  


  102mmol/L


() 


 


 


Carbon Dioxide Level


  


  


  26mmol/L


(21-32) 


 


 


Anion Gap   12 (6-14)  


 


Blood Urea Nitrogen   58mg/dL (7-20)  


 


Creatinine


  


  


  4.5mg/dL


(0.6-1.0) 


 


 


Estimated GFR


(Cockcroft-Gault) 


  


  10.0 


  


 


 


Glucose Level


  


  


  228mg/dL


(70-99) 


 


 


Calcium Level


  


  


  7.6mg/dL


(8.5-10.1) 


 











Micro


Micro





Microbiology


2/10/17 Blood Culture - Final, Complete


          NO GROWTH AFTER 5 DAYS


2/23/17 Throat Culture - Final, Complete


          


2/23/17 - Final, Complete


          


2/12/17 Urine Culture - Final, Complete


          


2/12/17 Urine Culture Result 1 (SEAN) - Final, Complete





Review of Systems


Constitutional:  yes: alert, oriented


Ears/Nose/Throat:  Yes: no symptom reported


Eyes:  Yes: no symptom reported


Pulmonary:  Yes no symptom reported


Cardiovascular:  Yes no symptom reported


Gastrointestional:  Yes: constipation


Genitourinary:  Yes: no symptom reported


Musculoskeletal:  Yes: muscle stiffness





Physical Exam


General Appearance:  no apparent distress


Skin:  warm


Respiratory:  bilateral CTA


Heart:  S1S2, RRR


Abdomen:  soft, bowel sounds present


Neurology:  alert, oriented





Assessment


Assessment


IMP





NEW ESRD


ANEMIA


HTN


DM II





PLAN





HD TODAY 


UF TO DW


D/C PLANS NOTED


WILL HD HERE MWF TILL INSURANCE ISSUES STRAIGHTENED OUT FOR OP HD


UPDATED DAUGHTER








JANE CAMARILLO MD Feb 27, 2017 10:59 room air

## 2021-06-25 NOTE — ED PROVIDER NOTE - CLINICAL SUMMARY MEDICAL DECISION MAKING FREE TEXT BOX
75 yr old f that presents with worsenign mental status and fall   -labs  -ekg  -ua  -imaging  -consider admission

## 2021-06-25 NOTE — ED ADULT NURSE NOTE - OBJECTIVE STATEMENT
Pt presents to ED from home after a fall from standing onto ottoman; denies LOC/blood thinners/head trauma. Pt has hx of dementia. As per her son, pt has worsening symptoms, w/ aggressive behavior, especially since her doctor increased her Seroquel several days ago. As per son, pt has not been able to sleep in over 48 hours, which is what he attributes her fall to.

## 2021-06-25 NOTE — H&P ADULT - NSHPPHYSICALEXAM_GEN_ALL_CORE
PHYSICAL EXAM:  GENERAL: NAD, lying in bed comfortably  HEAD:  Atraumatic, Normocephalic  EYES: EOMI, PERRLA, conjunctiva and sclera clear  ENT: Moist mucous membranes  NECK: Supple, No JVD  CHEST/LUNG: Clear to auscultation bilaterally; No rales, rhonchi, wheezing, or rubs. Unlabored respirations  HEART: Regular rate and rhythm; No murmurs, rubs, or gallops  ABDOMEN: Bowel sounds present; Soft, Nontender, Nondistended. No hepatomegaly  EXTREMITIES:  2+ Peripheral Pulses, brisk capillary refill. No clubbing, cyanosis, or edema  NERVOUS SYSTEM:  Alert & Oriented X3, speech clear. No deficits   MSK: FROM all 4 extremities, full and equal strength  SKIN: No rashes or lesions PHYSICAL EXAM:  GENERAL: NAD, lying in bed comfortably  HEAD:  Atraumatic, Normocephalic  EYES: EOMI, PERRLA, conjunctiva and sclera clear  ENT: Moist mucous membranes  NECK: Supple, No JVD  CHEST/LUNG: Clear to auscultation bilaterally; No rales, rhonchi, wheezing, or rubs. Unlabored respirations  HEART: Regular rate and rhythm; No murmurs, rubs, or gallops  ABDOMEN: Bowel sounds present; Soft, Nontender, Nondistended. No hepatomegaly  EXTREMITIES:  2+ Peripheral Pulses, brisk capillary refill. No clubbing, cyanosis, or edema  NERVOUS SYSTEM:  Alert & Oriented X3, speech clear. No deficits   MSK: FROM all 4 extremities, full and equal strength, Left AKA  SKIN: No rashes or lesions

## 2021-06-25 NOTE — ED PROVIDER NOTE - PHYSICAL EXAMINATION
CONST: NAD  EYES: Sclera and conjunctiva clear.   ENT: No nasal discharge. Oropharynx normal appearing, no erythema or exudates. No abscess or swelling. Uvula midline.   NECK: Non-tender, no meningeal signs. normal ROM. supple   CARD: S1 S2; No jvd  RESP: Equal BS B/L, No wheezes, rhonchi or rales. No distress  GI: Soft, non-tender, non-distended. no cva tenderness. normal BS  MS: LLE amputation. Normal ROM in all extremities. pulses 2 +. no calf tenderness or swelling  SKIN: Warm, dry, no acute rashes. Good turgor  NEURO: A&Ox2, No focal deficits. Strength 5/5 with no sensory deficits.

## 2021-06-25 NOTE — ED PROVIDER NOTE - CARE PLAN
Principal Discharge DX:	Urinary tract infection without hematuria, site unspecified  Secondary Diagnosis:	Alzheimer's dementia without behavioral disturbance, unspecified timing of dementia onset
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
## 2021-06-25 NOTE — ED ADULT TRIAGE NOTE - CHIEF COMPLAINT QUOTE
Pt fell last night from standing onto ottoman. denies LOC. denies blood thinners. denies head trauma. hx of dementia. As per son worsening symptoms, demonstrating aggressive behavior at home.Pt calm in triage

## 2021-06-26 NOTE — PROGRESS NOTE ADULT - ATTENDING COMMENTS
patient seen and examined with resident earlier today.  patient is awake, alert, appropriate in her responses.  she has no c/o pain, SOB  Vital Signs Last 24 Hrs  T(C): 35.7 (26 Jun 2021 12:55), Max: 36.7 (26 Jun 2021 05:19)  T(F): 96.3 (26 Jun 2021 12:55), Max: 98.1 (26 Jun 2021 05:19)  HR: 95 (26 Jun 2021 12:55) (61 - 101)  BP: 116/59 (26 Jun 2021 12:55) (116/59 - 171/92)  RR: 18 (26 Jun 2021 12:55) (18 - 20)  SpO2: 99% (25 Jun 2021 21:23) (99% - 99%)     conj pink, no jaundice  neck supple. no bruit no JVD  lungs clear to auscultation with good air entry bilaterally  heart regular rhythm. no murmur or gallop  abd. soft nontender BS pos.  extremiites s/p left AKA, right leg no edema noted.  good skin turgor  Labs:                   12.9   5.63  )-----------( 206      ( 26 Jun 2021 07:39 )             40.2   06-26    142  |  109  |  11  ----------------------------<  87  4.0   |  22  |  0.7    Ca    10.3<H>      26 Jun 2021 07:39  Mg     1.8     06-26    TPro  6.9  /  Alb  4.0  /  TBili  0.5  /  DBili  x   /  AST  14  /  ALT  11  /  AlkPhos  174<H>  06-26    A: frequent falls - ?worsening dementia  dementia - CT shows extensive microvascular disease may be vascular dementia  hypercalcemia and elevated alkaline phosphatase - ?hyper PTH or Vit D intoxication or immobilization hypercalcemia  s/p left AKA - stable  possible UTI - pos. leukocyte esterase and pos. wbc's and patient had change in MS  P: urine culture   treat with IV ceftriaxone  moniotr Ca  MRI brain as requested by neurology  check PTH/25OH Vit D level  PT eval
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
## 2021-06-27 NOTE — PHYSICAL THERAPY INITIAL EVALUATION ADULT - ASSISTIVE DEVICE FOR TRANSFER: SIT/STAND, REHAB EVAL
"Cardiology Office Note      Primary/Referring Physician:   Imelda Gaston MD    Reason for Visit:  Lan Melgar returns for an office visit in follow up of cardiovascular disease. History of Presenting Illness:  Lan Melgar was seen recently for a plethora of complaints including worsening shortness of breath and fatigue. Shortness of breath occurs primarily with bending over, such as tying his shoes and after taking his prescribed medications. Questionable orthopnea was reported last night. Since September 2020, he has gained 13 lb in weight; uses CPAP at home reliably for sleep apnea. States that prior to medication administration he feels âfairâ but after medications, shortness of breath worsens, weakness worsens as does fatigue and he notes random chest pain and cramping in his arms and legs. He further reports worsening fatigue and brief "" fainting spells\"", describing suddenly blacking out for 2-3 minutes. Episodes are typically preceded by an urge to relieve himself and by lightheadedness; last event was more than a month ago at which time he fell and broke his right toe. He underwent an echocardiogram which is described below.     Problem List:    Paroxysmal atrial fibrillation - on anticoagulation  Â· RF Ablation 10/9/2018  Â· Cardioversion, 7/2018 and 9/2018 and 12/2018  Â· Amiodarone induced hyperthyroidism - converted to sinus rhythm with Tikosyn, 9/2020  Â· Normal coronary arteries; cath 2017  Â· EF 50%; Echo 12/2019  Cerebrovascular accident (1/30/17)  Â· Left parietal stroke with aphasia- treated with IV TPA  Â· Expressive aphasia; mild motor weakness  Hypertension  Hyperlipidemia  Obstructive sleep apnea  Juvenile Diabetes mellitus  Â· Associated retinopathy and nephropathy  Â· Insulin Pump  Chronic kidney disease- stage 3  GERD  Dizziness/lightheadedness  Depression  Degenerative disc disease  Â· Cervical spine C5/C6 involvement    Medications and Allergies:     Current Facility-Administered " Medications   Medication Dose Route Frequency Provider Last Rate Last Admin   â¢ sodium chloride 0.9% infusion   Intravenous Continuous Betty Augustine  mL/hr at 05/03/21 0834 New Bag at 05/03/21 0834   â¢ sodium chloride (PF) 0.9 % injection 2 mL  2 mL Intracatheter 2 times per day Betty Augustine MD   2 mL at 05/03/21 0835   â¢ sodium chloride 0.9 % flush bag 25 mL  25 mL Intravenous PRN Betty Augustine MD       â¢ hydrALAZINE (APRESOLINE) injection 10 mg  10 mg Intravenous Q4H PRN Betty Augustine MD          ALLERGIES:   Allergen Reactions   â¢ Captopril Other (See Comments)     Cough/sputum   â¢ Ibuprofen Other (See Comments)     Acute renal (kidney) failure   â¢ Morphine Nausea & Vomiting         Social History:   , 2 children. Non smoker. No alcohol. 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 No regular exercise/unable - as outlined above. Family History:  No premature coronary artery disease. Review of Systems:  As in History of Present Illness. All remaining systems are reviewed and are negative. Physical Exam:  Visit Vitals  BP (!) 153/72   Pulse 73   Temp 97.6 Â°F (36.4 Â°C) (Temporal)   Resp (!) 24   Ht 6' (1.829 m)   Wt 111.4 kg   SpO2 98%   BMI 33.31 kg/mÂ²     Wt Readings from Last 4 Encounters:   05/03/21 111.4 kg   04/13/21 111.6 kg   03/24/21 114.2 kg   01/28/21 112 kg     General:  Well developed, obese. Comfortable. Neck:  Carotid upstrokes are normal. Soft bilateral bruits. Cardiovascular:  Normal S1 and S2; regular rhythm; 2/6 systolic murmur; no gallops or rubs. Lungs/Chest:  Clear to auscultation. Respiratory effort is normal.  Abdomen:  Soft and non tender. Bowel sounds are normal.   Extremities:  No clubbing, cyanosis; no edema. Pulses:  Dorsalis Pedis- 2+ bilaterally; Posterior Tibials absent bilaterally. Skin:  Warm, dry and intact. No rash or ulceration noted.   Neurological:  No focal neurological deficits except for difficulty with word finding/expressing himself - worse when anxious/excited. Psychiatric:  Mood and affect are normal.     Recent Labs   Lab 04/21/21  1232 06/09/20  0929   Cholesterol 196 134   HDL 73 56   CALCLDL 108 71   Triglycerides 77 37   Non-HDL Cholesterol 123 78     Recent Labs   Lab 05/03/21  0828 04/21/21  1232 02/24/21  1248 01/20/21  1514 10/20/20  1313 09/24/20  1644   Sodium 136 139 139 139  --   --    Potassium 4.1 3.7 3.6 3.8  --   --    Chloride 101 99 100 104  --   --    Creatinine 1.65* 1.72* 1.65* 1.42*  --   --    BUN 30* 25* 31* 28*  --   --    BUN/ Creatinine Ratio 18 15 19 20  --   --    Glucose 171* 120* 106* 128*  --   --    Calcium 9.1 9.5 9.4 9.5  --   --    TSH  --  138.058*  --  139.027* 0.164* <0.008*       Cardiovascular Diagnostics:  EKG 2/24/2021: Sinus bradycardia with 1st degree AV block Nonspecific ST and T wave abnormality. Abnormal ECG  EKG 09/24/2020:  Sinus rhythm/sinus bradycardia with first-degree AV block / ms  EKG 09/11/2020: Sinus bradycardia with 1st degree AV block Low voltage QRS   EKG 10/9/2018: Atrial flutter with variable AV block with ocassional premature ventricular complexes. Low voltage QRS. Nonspecific ST and T wave abnormality. Coronary Angiogram 4/19/17:  Normal coronary arteries. Elevated end diastolic pressure at rest with mild global hypokinesis and mildly reduced systolic function. This study fails to demonstrate any significant obstructive coronary disease. Shortness of breath could be related to elevated end-diastolic pressure. Patient will continue medical therapy with addition of long-acting nitrates. Echo 12/9/2019:  Mild global left ventricular hypokinesis. No regional wall motion abnormalities. Mildly increased left atrial size. Aortic valve mean gradient is 16.1 mmHg. Aortic valve area is 1.3 cmÂ² Mild - moderate aortic valve regurgitation. No significant change since the prior study.     Echo, 4/2021:  Normal left ventricular size, wall thickness and moderately decreased left ventricular systolic function with global hypokinesis. LVEF, 38 %. Mildly increased left atrial volume 34.5 ml/mÂ². Moderate aortic valve stenosis, mean gradient 21.4 mmHg, DEVEN 1.2 cmÂ². Moderate aortic valve regurgitation. Moderate mitral valve regurgitation. Moderate pulmonary hypertension, RVSP 51.5 mmHg. Event monitor 12/2/2016:  Episodes of paroxysmal atrial fibrillation at times with rapid ventricular rates. Event monitor 9/2019:  Sinus bradycardia; average heart rate 57; no tachycardia; no atrial fibrillation, pauses or heart blocks. CAROTID US 1/2014: Mild bilateral carotid plaque, with no significant flow disturbance produced. By standardized velocity criteria, there is 1%-15% disease bilaterally. US Aorta Screening 12/09/2019: No sonographic evidence of abdominal aortic aneurysm. Right common iliac artery aneurysm. Â   Assessment:  Chronic diastolic HF:  Reports worsening exertional shortness of breath and undue fatigue in association with reduction in LV function and pulmonary hypertension. Will proceed with right and left heart cath/PCI as indicated    Paroxysmal atrial fibrillation: Maintaining sinus rhythm following chemical cardioversion with Tikosyn in 12/2018; confirmed with EKG. Hypertension:  Blood pressures are well controlled on current regimen. Hyperlipidemia:  Lipids are well controlled as of June 2020    Recommendation/Plan:   Right and left heart cath/ PCI. I have discussed the procedural details including its risks, benefits and alternatives. Patient demonstrates understanding and agrees to go along with the procedure.     Amy Ferreira MD rolling walker

## 2021-06-27 NOTE — PROGRESS NOTE ADULT - SUBJECTIVE AND OBJECTIVE BOX
patient seen and examined.  patient is awake, alert, appropriate in her responses.  she has no c/o pain, SOB  Vital Signs Last 24 Hrs  T(C): 36.5 (27 Jun 2021 05:23), Max: 36.5 (27 Jun 2021 05:23)  T(F): 97.7 (27 Jun 2021 05:23), Max: 97.7 (27 Jun 2021 05:23)  HR: 101 (27 Jun 2021 05:23) (95 - 101)  BP: 102/59 (27 Jun 2021 05:23) (102/59 - 124/64)  RR: 18 (27 Jun 2021 05:23) (18 - 18)  SpO2: 96% (26 Jun 2021 19:30) (96% - 96%)     conj pink, no jaundice  neck supple. no bruit no JVD  lungs clear to auscultation with good air entry bilaterally  heart regular rhythm. no murmur or gallop  abd. soft nontender BS pos.  extremiites s/p left AKA, right leg no edema noted.  good skin turgor  Labs:                                       12.4   6.32  )-----------( 206      ( 27 Jun 2021 07:33 )             38.8   06-27    142  |  111<H>  |  15  ----------------------------<  104<H>  4.0   |  23  |  0.8    Ca    10.4<H>      27 Jun 2021 07:33  Mg     2.0     06-27    TPro  6.7  /  Alb  3.9  /  TBili  0.5  /  DBili  x   /  AST  10  /  ALT  9   /  AlkPhos  169<H>  06-27    EEG showed Right mid to posterior temporal sharp transients epileptiform activity    A: frequent falls - ?worsening dementia vs. possible seizures  dementia - CT shows extensive microvascular disease may be vascular dementia  hypercalcemia and elevated alkaline phosphatase - ?hyper PTH or Vit D intoxication or immobilization hypercalcemia  s/p left AKA - stable  possible UTI - pos. leukocyte esterase and pos. wbc's and patient had change in MS  P: urine culture still pending  continue IV ceftriaxone - will give 5 days today is Day #3  monitor Ca - still elevated  MRI brain as requested by neurology  discuss EEG results with neurology - ?antiseizure medication  check PTH/25OH Vit D level  PT eval.
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
## 2021-06-28 NOTE — DISCHARGE NOTE NURSING/CASE MANAGEMENT/SOCIAL WORK - PATIENT PORTAL LINK FT
You can access the FollowMyHealth Patient Portal offered by [facility] by registering at the following website: [email or web address]. By joining [facility]’s FollowMyHealth portal, you will also be able to view your health information using other applications (apps) compatible with our system.

## 2021-06-28 NOTE — BEHAVIORAL HEALTH ASSESSMENT NOTE - NSBHREFERDETAILS_PSY_A_CORE_FT
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  Pt does NOT seem to be responding to melatonin and seroquel 25mg @ 12pm and 50mg @ HS. Thanks.

## 2021-06-28 NOTE — BEHAVIORAL HEALTH ASSESSMENT NOTE - NSBHMEDSOTHERFT_PSY_A_CORE
folic acid 1 mg oral tablet: 1 tab(s) orally once a day (26 Jun 2021 00:40)  Lipitor 40 mg oral tablet: 1 tab(s) orally once a day (26 Jun 2021 00:40)  Melatonin 5 mg oral tablet: 1 tab(s) orally once a day (at bedtime) (26 Jun 2021 00:41)  Norvasc 5 mg oral tablet: 1 tab(s) orally once a day (26 Jun 2021 00:41)  QUEtiapine 50 mg oral tablet, extended release: 1 tab(s) orally once a day (in the evening) (26 Jun 2021 00:37)  QUEtiapine extended release: 25 milligram(s) orally once a day (26 Jun 2021 00:38)

## 2021-06-28 NOTE — BEHAVIORAL HEALTH ASSESSMENT NOTE - NSBHCHARTREVIEWIMAGING_PSY_A_CORE FT
< from: CT Head No Cont (06.25.21 @ 20:20) >      EXAM:  CT BRAIN            PROCEDURE DATE:  06/25/2021            INTERPRETATION:  CLINICAL INDICATION: Status post fall    TECHNIQUE: CT of the head was performed without the administration of intravenous contrast.    COMPARISON: CT head dated 1/11/2021    FINDINGS:    There is prominence of the sulci, sylvian fissures, and ventricles, reflecting stable mild diffuse parenchymal volume loss.    There are scattered patchy low attenuations in the periventricular cerebral white matter consistent with stable severe chronic microvascular ischemic changes.    There is no evidence of acute territorial/transcortical infarction or intracranial hemorrhage. There is no space-occupying lesion or midline shift.    There is no evidence of hydrocephalus. There are no extra-axial fluid collections.    Status post bilateral cataract surgery, stable. The imaged portions of the paranasal sinuses are aerated.The mastoid air cells are aerated. The visualized soft tissues and osseous structures appear normal. Partially visualized parotid glands are normal.      IMPRESSION:    No acute intracranial pathology.    Stable severe chronic microvascular ischemic changes.      LUZ COWAN MD; Attending Radiologist  This document has been electronically signed. Jun 25 2021  4:45PM    < end of copied text >
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
## 2021-06-28 NOTE — BEHAVIORAL HEALTH ASSESSMENT NOTE - NSBHCHARTREVIEWINVESTIGATE_PSY_A_CORE FT
< from: 12 Lead ECG (06.25.21 @ 14:35) >    Ventricular Rate 56 BPM    Atrial Rate 56 BPM    P-R Interval 128 ms    QRS Duration 94 ms    Q-T Interval 444 ms    QTC Calculation(Bazett) 428 ms    P Axis 14 degrees    R Axis -16 degrees    T Axis -6 degrees    Diagnosis Line Sinus bradycardia  Voltage criteria for left ventricular hypertrophy  Cannot rule out Inferior infarct , age undetermined  Abnormal ECG    Confirmed by JOSE ENRIQUE ALEX MD ([id number]) on 6/25/2021 3:21:01 PM    < end of copied text >

## 2021-06-28 NOTE — DISCHARGE NOTE PROVIDER - NSDCCPCAREPLAN_GEN_ALL_CORE_FT
PRINCIPAL DISCHARGE DIAGNOSIS  Diagnosis: Dementia  Assessment and Plan of Treatment: You presented to the hospital after suffering a mechanical fall. You were having hallucinations according to your son. You were evaluated by neurology and psychiaty. We performed an EEG, 24 hour VEEG, and MRI of the brain. Please follow up with your Neurologist Dr. Nguyen.       PRINCIPAL DISCHARGE DIAGNOSIS  Diagnosis: Dementia  Assessment and Plan of Treatment: You presented to the hospital after suffering a mechanical fall. You were having hallucinations according to your son. You were evaluated by neurology and psychiaty. We performed an EEG, 24 hour VEEG, and MRI of the brain. The MRI of the brain showed severe chronic microvascular changes and parenchymal atrophy which was the same finding in her previous MRI in 2014. This MRI finding is consistent with the progressive nature of her dementia.  Please follow up with your Neurologist Dr. Nguyen.       PRINCIPAL DISCHARGE DIAGNOSIS  Diagnosis: Dementia  Assessment and Plan of Treatment: You presented to the hospital after suffering a mechanical fall. You were having hallucinations according to your son. You were evaluated by neurology and psychiaty. We performed an EEG, 24 hour VEEG, and MRI of the brain. The MRI of the brain showed severe chronic microvascular changes and parenchymal atrophy which was the same finding in her previous MRI in 2014. This MRI finding is consistent with the progressive nature of her dementia.  Please follow up with your Neurologist Dr. Nguyen in 1-2 weeks. Please also follow up with Geriatric Psychiatry is 1-2 weeks.       PRINCIPAL DISCHARGE DIAGNOSIS  Diagnosis: Dementia  Assessment and Plan of Treatment: You presented to the hospital after suffering a mechanical fall. You were having hallucinations according to your son. You were evaluated by neurology and psychiaty. We performed an EEG, 24 hour VEEG, and MRI of the brain. The MRI of the brain showed severe chronic microvascular changes and parenchymal atrophy which was the same finding in her previous MRI in 2014. This MRI finding is consistent with the progressive nature of her dementia.  Please follow up with your Neurologist Dr. Nguyen in 1-2 weeks. Please also follow up with Geriatric Psychiatry is 1-2 weeks.  Please make an appointment at:  [address] [place] [address]  [phone]       PRINCIPAL DISCHARGE DIAGNOSIS  Diagnosis: Dementia  Assessment and Plan of Treatment: You presented to the hospital after suffering a mechanical fall. Your trauma imaging did not reveal any fractures. You were having hallucinations according to your son which have present for months. You were evaluated by neurology and psychiaty. We performed an EEG, 24 hour VEEG, and MRI of the brain. The EEG showed small eplieptic activity (likley due to worsening of your severe dementia) for which you were started on Depakote. The MRI of the brain showed severe chronic microvascular changes and parenchymal atrophy which was the same finding in her previous MRI in 2014. This MRI finding is consistent with the progressive nature of her dementia.  Please follow up with your Neurologist Dr. Nguyen in 1-2 weeks. Please also follow up with Geriatric Psychiatry is 1-2 weeks.  Please take Cymbalta 100mg at bedtime and 25mg 3 times per day as needed, and all other medications as prescribed.   Please make an appointment with [facility] at:  [address] [place] [address]  [phone]

## 2021-06-28 NOTE — BEHAVIORAL HEALTH ASSESSMENT NOTE - NSBHCONSULTFOLLOWAFTERCARE_PSY_A_CORE FT
On discharge, can refer patient to Geriatric Psychiatrist at [facility], located at [address] [place] [address], phone number - [phone] (patient can be given [phone] to make appointment themselves).

## 2021-06-28 NOTE — DISCHARGE NOTE PROVIDER - NSDCMRMEDTOKEN_GEN_ALL_CORE_FT
folic acid 1 mg oral tablet: 1 tab(s) orally once a day  Lipitor 40 mg oral tablet: 1 tab(s) orally once a day  Melatonin 5 mg oral tablet: 1 tab(s) orally once a day (at bedtime)  Norvasc 5 mg oral tablet: 1 tab(s) orally once a day  QUEtiapine 50 mg oral tablet, extended release: 1 tab(s) orally once a day (in the evening)  QUEtiapine extended release: 25 milligram(s) orally once a day   clonazePAM 0.5 mg oral tablet: 1 tab(s) orally once a day (at bedtime) MDD:0.5mg  divalproex sodium 250 mg oral delayed release tablet: 1 tab(s) orally 2 times a day  folic acid 1 mg oral tablet: 1 tab(s) orally once a day  gabapentin 100 mg oral capsule: 1 cap(s) orally every 8 hours  Lipitor 40 mg oral tablet: 1 tab(s) orally once a day  melatonin 10 mg oral tablet: 1 tab(s) orally once a day (at bedtime)  metoprolol succinate 25 mg oral tablet, extended release: 1 tab(s) orally once a day  Norvasc 5 mg oral tablet: 1 tab(s) orally once a day  QUEtiapine 100 mg oral tablet: 1 tab(s) orally once a day (at bedtime) MDD:100mg  QUEtiapine 25 mg oral tablet: 1 tab(s) orally 3 times a day, As Needed MDD:75mg   acetaminophen 325 mg oral tablet: 2 tab(s) orally every 6 hours, As needed, Temp greater or equal to 38C (100.4F), Mild Pain (1 - 3)  clonazePAM 0.5 mg oral tablet: 1 tab(s) orally once a day (at bedtime) MDD:0.5mg  divalproex sodium 250 mg oral delayed release tablet: 1 tab(s) orally 2 times a day  folic acid 1 mg oral tablet: 1 tab(s) orally once a day  gabapentin 100 mg oral capsule: 1 cap(s) orally every 8 hours  KlonoPIN 0.5 mg oral tablet: 1 tab(s) orally once a day (at bedtime) -for insomnia MDD:1 tab   Lipitor 40 mg oral tablet: 1 tab(s) orally once a day  melatonin 10 mg oral tablet: 1 tab(s) orally once a day (at bedtime)  metoprolol succinate 25 mg oral tablet, extended release: 1 tab(s) orally once a day  Norvasc 5 mg oral tablet: 1 tab(s) orally once a day  QUEtiapine 100 mg oral tablet: 1 tab(s) orally once a day (at bedtime) MDD:100mg  QUEtiapine 25 mg oral tablet: 1 tab(s) orally 3 times a day, As Needed MDD:75mg  traMADol 50 mg oral tablet: 0.5 tab(s) orally every 8 hours, As needed, Severe Pain (7 - 10)  traMADol 50 mg oral tablet: 0.5 tab(s) orally every 8 hours, As Needed -for severe pain MDD:1.5 tab

## 2021-06-28 NOTE — CHART NOTE - NSCHARTNOTEFT_GEN_A_CORE
Patient with history of dementia presents with insomnia and worsening agitation.   REEG showed sharp waves which are no clear to be epileptogenic     - VEEG   - MRI Brain w/o contrast  - Continue Seroquel   - Psych consult for agitation.

## 2021-06-28 NOTE — DISCHARGE NOTE PROVIDER - PROVIDER TOKENS
PROVIDER:[TOKEN:[[id number]]] PROVIDER:[TOKEN:[[id number]]],FREE:[LAST:[[facility]],FIRST:[Psychiatry],PHONE:[(   )    -],FAX:[(   )    -],ADDRESS:[Please make an appointment with [facility] at:    [address] [place] [address]  [phone]]]

## 2021-06-28 NOTE — BEHAVIORAL HEALTH ASSESSMENT NOTE - NSBHCHARTREVIEWLAB_PSY_A_CORE FT
13.6   6.23  )-----------( 198      ( 28 Jun 2021 06:50 )             43.0     06-28    144  |  109  |  20  ----------------------------<  104<H>  3.9   |  25  |  0.8    Ca    9.9      28 Jun 2021 06:50  Mg     1.7     06-28    TPro  6.9  /  Alb  4.1  /  TBili  0.3  /  DBili  x   /  AST  11  /  ALT  9   /  AlkPhos  183<H>  06-28    LIVER FUNCTIONS - ( 28 Jun 2021 06:50 )  Alb: 4.1 g/dL / Pro: 6.9 g/dL / ALK PHOS: 183 U/L / ALT: 9 U/L / AST: 11 U/L / GGT: x

## 2021-06-28 NOTE — BEHAVIORAL HEALTH ASSESSMENT NOTE - MEDICAL RECORD REVIEWED
New to me; chronic  No longer using medication at this time.   Reports that he is doing much better off the medications.   Seeing psychologist once per week.   No SI/HI    Working on mindfulness practices which has been helping his mood.        Yes
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
## 2021-06-28 NOTE — BEHAVIORAL HEALTH ASSESSMENT NOTE - RISK ASSESSMENT
Low Acute Suicide Risk Patient's risk of self-harm is mitigated by her lack of suicidal ideation or attempts, good support from family, engagement in treatment, and access to care

## 2021-06-28 NOTE — BEHAVIORAL HEALTH ASSESSMENT NOTE - NSBHCHARTREVIEWVS_PSY_A_CORE FT
Vital Signs Last 24 Hrs  T(C): 36.8 (28 Jun 2021 13:49), Max: 36.8 (28 Jun 2021 13:49)  T(F): 98.3 (28 Jun 2021 13:49), Max: 98.3 (28 Jun 2021 13:49)  HR: 105 (28 Jun 2021 13:49) (86 - 105)  BP: 120/57 (28 Jun 2021 13:49) (119/65 - 123/64)  BP(mean): --  RR: 18 (28 Jun 2021 13:49) (18 - 18)  SpO2: 98% (28 Jun 2021 07:56) (98% - 98%)

## 2021-06-28 NOTE — DISCHARGE NOTE PROVIDER - CARE PROVIDER_API CALL
Jose David Nguyen)  Neurology  [address] [place] [address]  Phone: [phone]  Fax: [phone]  Follow Up Time:    Jose David Nguyen)  Neurology  [address] [place] [address]  Phone: [phone]  Fax: [phone]  Follow Up Time:     [facility], Psychiatry  Please make an appointment with [facility] at:    [address] [place] [address]  [phone]  Phone: (   )    -  Fax: (   )    -  Follow Up Time:
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
## 2021-06-28 NOTE — PROGRESS NOTE ADULT - SUBJECTIVE AND OBJECTIVE BOX
DANISHA JIMÉNEZ  75y  Female  ***My note supersedes ALL resident notes that I sign.  My corrections for their notes are in my note.***    I can be reached directly on [id number]. My office number is [phone]. My personal cell number is [phone].    INTERVAL EVENTS: Here for f/u of dementia. Pt appears to be at baseline. She is talking fine. This morning she said she felt well enough to go home. This afternoon, she is c/o 10/10 pain in left leg stump (and would not work w/ PT).  Son was at bedside and was upset about getting MRI as outpt.  He feels she may need sedation. I told son that pt can still have sedation as outpt.  He is also concerned about her not sleeping well. He wants to speak w/ neuro attd.  I spoke w/ neuro attd and reviewed case w/ him. Dr Nguyen wants a 24hr VEEG and MRI B and psych eval. He will speak w/ son. The pt mostly says that she wants to go home.    T(F): 98.3 (06-28-21 @ 13:49), Max: 98.3 (06-28-21 @ 13:49)  HR: 105 (06-28-21 @ 13:49) (86 - 105)  BP: 120/57 (06-28-21 @ 13:49) (119/65 - 123/64)  RR: 18 (06-28-21 @ 13:49) (18 - 18)  SpO2: 98% (06-28-21 @ 07:56) (95% - 98%)    Gen: NAD; seems calm; speaks well; has mild baseline confusion  HEENT: PERRL, EOMI, mouth clr, nose clr  Neck: no nodes, no JVD, thyroid nl  lungs: clr  hrt: s1 s2 rrr no murmur  abd: soft, NT/ND, no HS megaly  ext: no edema rt leg, no c/c  lt leg w/ aka; well healed scar; no red, not hot, no swelling, but mild tenderness (pt says 10/10, but not that way on exam, sergei if distracted)  neuro: aa ox2 (not well to time), cn intact, can move all 4 ext    LABS:                      13.6    (    90.9   6.23  )-----------( ---------      198      ( 28 Jun 2021 06:50 )             43.0    (    13.6     144   (   109   (   104      06-28-21 @ 06:50  ----------------------               3.9   (   25   (   20                             -----                        0.8  Ca  9.9   Mg  1.7    P   --     LFT  6.9  (  0.3  (  11       06-28-21 @ 06:50  -------------------------  4.1  (  183  (  9    Alb 4.1  T angela 0.3     AST 11  ALT 9  06-28-21 @ 06:50  Alb 3.9  T angela 0.5     AST 10  ALT 9  06-27-21 @ 07:33  Alb 4.0  T angela 0.5     AST 14  ALT 11  06-26-21 @ 07:39  Alb 4.5  T angela 0.5     AST 15  ALT 14  06-25-21 @ 15:55    Culture - Urine (collected 06-25-21 @ 15:55)  Source: .Urine Clean Catch (Midstream)  Final Report (06-27-21 @ 18:45):    <10,000 CFU/mL Normal Urogenital Marylin    RADIOLOGY & ADDITIONAL TESTS:  < from: CT Head No Cont (06.25.21 @ 20:20) >  Status post bilateral cataract surgery, stable. The imaged portions of the paranasal sinuses are aerated. The mastoid air cells are aerated. The visualized soft tissues and osseous structures appear normal. Partially visualized parotid glands are normal.    IMPRESSION:    No acute intracranial pathology.    Stable severe chronic microvascular ischemic changes.    < end of copied text >    < from: Xray Knee 3 Views, Right (06.25.21 @ 16:17) >  Impression:  No evidence of acute osseous abnormality.    < end of copied text >    < from: Xray Chest 1 View- PORTABLE-Urgent (Xray Chest 1 View- PORTABLE-Urgent .) (06.25.21 @ 16:17) >  Impression:    No radiographic evidence of acute cardiopulmonary disease.    < end of copied text >    MEDICATIONS:    acetaminophen   Tablet .. 650 milliGRAM(s) Oral every 6 hours PRN  amLODIPine   Tablet 5 milliGRAM(s) Oral daily  atorvastatin 40 milliGRAM(s) Oral at bedtime  chlorhexidine 4% Liquid 1 Application(s) Topical <User Schedule>  enoxaparin Injectable 40 milliGRAM(s) SubCutaneous daily  folic acid 1 milliGRAM(s) Oral daily  melatonin 5 milliGRAM(s) Oral at bedtime  QUEtiapine 50 milliGRAM(s) Oral at bedtime  QUEtiapine 25 milliGRAM(s) Oral <User Schedule>
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
## 2021-06-29 NOTE — PROGRESS NOTE ADULT - SUBJECTIVE AND OBJECTIVE BOX
DANISHA JIMÉNEZ  75y  Female  ***My note supersedes ALL resident notes that I sign.  My corrections for their notes are in my note.***    I can be reached directly on [id number]. My office number is [phone]. My personal cell number is [phone].    INTERVAL EVENTS: Here for f/u of dementia. Pt looks comfortable. Says left leg pain is better than yesterday. Pt did EEG and agrees to MRI-B. She would like to go home soon.    T(F): 97.5 (06-29-21 @ 13:10), Max: 98.9 (06-28-21 @ 20:22)  HR: 101 (06-29-21 @ 13:10) (101 - 104)  BP: 127/64 (06-29-21 @ 13:10) (127/64 - 129/64)  RR: 18 (06-29-21 @ 13:10) (18 - 18)  SpO2: 100% (06-29-21 @ 07:38) (100% - 100%)    Gen: NAD; seems calm; speaks well; has mild baseline confusion  HEENT: PERRL, EOMI, mouth clr, nose clr  Neck: no nodes, no JVD, thyroid nl  lungs: clr  hrt: s1 s2 rrr no murmur  abd: soft, NT/ND, no HS megaly  ext: no edema rt leg, no c/c  lt leg w/ aka; well healed scar; no red, not hot, no swelling, but mild tenderness (less if distracted)  neuro: aa ox2 (not well to time), cn intact, can move all 4 ext    LABS:                      13.5    (    90.7   6.88  )-----------( ---------      202      ( 29 Jun 2021 06:46 )             42.1    (    13.9     144   (   108   (   113      06-29-21 @ 06:46  ----------------------               3.7   (   24   (   11                             -----                        0.7  Ca  10.2   Mg  2.1    P   2.5     LFT  6.9  (  0.5  (  14       06-29-21 @ 06:46  -------------------------  3.9  (  157  (  10    Culture - Urine (collected 06-25-21 @ 15:55)  Source: .Urine Clean Catch (Midstream)  Final Report (06-27-21 @ 18:45):    <10,000 CFU/mL Normal Urogenital Marylin    RADIOLOGY & ADDITIONAL TESTS:  VEEG in the last 24 hours:    Background------------- symmetrical, slightly less than optimally organized reaching 7-8 hz    Focal and generalized slowing--------  mild generalized slowing.  mild Right hemispheric focal slowing    Interictal activity----------------  rare right posterior temporal/parietal sharps that appear epileptiform in nature    Events-----------------   none    Seizures--none    Impression:  abnormal  as above    Plan - discussed with the neurology . plan as / the team    MEDICATIONS:    acetaminophen   Tablet .. 650 milliGRAM(s) Oral every 6 hours PRN  amLODIPine   Tablet 5 milliGRAM(s) Oral daily  atorvastatin 40 milliGRAM(s) Oral at bedtime  chlorhexidine 4% Liquid 1 Application(s) Topical <User Schedule>  diVALproex  milliGRAM(s) Oral two times a day  enoxaparin Injectable 40 milliGRAM(s) SubCutaneous daily  folic acid 1 milliGRAM(s) Oral daily  gabapentin 100 milliGRAM(s) Oral every 8 hours  melatonin 5 milliGRAM(s) Oral at bedtime  QUEtiapine 100 milliGRAM(s) Oral at bedtime  QUEtiapine 25 milliGRAM(s) Oral every 8 hours PRN  traMADol 25 milliGRAM(s) Oral every 8 hours PRN

## 2021-06-29 NOTE — EEG REPORT - NS EEG TEXT BOX
Epilepsy Attending Note:     DANISHA JIMÉNEZ    75y Female  MRN [id number]    Vital Signs Last 24 Hrs  T(C): 37.2 (28 Jun 2021 20:22), Max: 37.2 (28 Jun 2021 20:22)  T(F): 98.9 (28 Jun 2021 20:22), Max: 98.9 (28 Jun 2021 20:22)  HR: 104 (28 Jun 2021 20:22) (104 - 105)  BP: 129/64 (28 Jun 2021 20:22) (120/57 - 129/64)  BP(mean): --  RR: 18 (28 Jun 2021 20:22) (18 - 18)  SpO2: 100% (29 Jun 2021 07:38) (100% - 100%)                          13.5   6.88  )-----------( 202      ( 29 Jun 2021 06:46 )             42.1       06-29    144  |  108  |  11  ----------------------------<  113<H>  3.7   |  24  |  0.7    Ca    10.2<H>      29 Jun 2021 06:46  Phos  2.5     06-29  Mg     2.1     06-29    TPro  6.9  /  Alb  3.9  /  TBili  0.5  /  DBili  x   /  AST  14  /  ALT  10  /  AlkPhos  157<H>  06-29      MEDICATIONS  (STANDING):  amLODIPine   Tablet 5 milliGRAM(s) Oral daily  atorvastatin 40 milliGRAM(s) Oral at bedtime  chlorhexidine 4% Liquid 1 Application(s) Topical <User Schedule>  enoxaparin Injectable 40 milliGRAM(s) SubCutaneous daily  folic acid 1 milliGRAM(s) Oral daily  gabapentin 100 milliGRAM(s) Oral every 8 hours  melatonin 5 milliGRAM(s) Oral at bedtime  QUEtiapine 50 milliGRAM(s) Oral at bedtime  QUEtiapine 25 milliGRAM(s) Oral <User Schedule>    MEDICATIONS  (PRN):  acetaminophen   Tablet .. 650 milliGRAM(s) Oral every 6 hours PRN Temp greater or equal to 38C (100.4F), Mild Pain (1 - 3)  traMADol 25 milliGRAM(s) Oral every 8 hours PRN Severe Pain (7 - 10)            VEEG in the last 24 hours:    Background------------- symmetrical, slightly less than optimally organized reaching 7-8 hz    Focal and generalized slowing--------  mild generalized slowing.  mild Right hemispheric focal slowing    Interictal activity----------------  rare right posterior temporal/parietal sharps that appear epileptiform in nature    Events-----------------   none    Seizures--none    Impression:  abnormal  as above    Plan - discussed with the neurology . plan as / the team

## 2021-06-29 NOTE — PROGRESS NOTE ADULT - ASSESSMENT
75y year old Female presenting with worsening confusion and history of dementia. VEEG overnight showed epileptiform discharges.     - UTI and high calcium level assessment per primary team   - Start Depakote 250 mg BID and check the level in AM   - MRI Brain w/o contrast   - Agree with higher dose of Seroquel  - Outpatient follow up after discharge after Brain MRI

## 2021-06-29 NOTE — CHART NOTE - NSCHARTNOTEFT_GEN_A_CORE
Letter of Medical Necessity    The pt has a mobility limitation that significantly impairs the pts ability to participate in one or more MRADLs such as toileting, eating, dressing, and bathing in customary locations in the home. The pts home provides adequate access between rooms for the use of the transport wheelchair. The transport wheelchair will significantly improve the pts ability to participate in MRADLs and will be used on a regular basis in the home. The pts mobility limitation cannot be resolved by the use of a walker or cane. The pt does not have sufficient upper extremity function to safely propel the manual wheelchair in the home during a typical day. the pt has agreed to the use the transport wheelchair that is provided in the home. the patient has a caregiver that is willing and able to propel the transport wheelchair at anytime during the day. Letter of Medical Necessity    The pt has a mobility limitation (Left AKA and worsening Dementia) that significantly impairs the pts ability to participate in one or more MRADLs such as toileting, eating, dressing, and bathing in customary locations in the home. The pts home provides adequate access between rooms for the use of the transport wheelchair. The transport wheelchair will significantly improve the pts ability to participate in MRADLs and will be used on a regular basis in the home. The pts mobility limitation cannot be resolved by the use of a walker or cane. The pt does not have sufficient upper extremity function to safely propel the manual wheelchair in the home during a typical day. the pt has agreed to the use the transport wheelchair that is provided in the home. the patient has a caregiver that is willing and able to propel the transport wheelchair at anytime during the day.
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
## 2021-06-30 NOTE — PROGRESS NOTE ADULT - PROVIDER SPECIALTY LIST ADULT
Hospitalist
Internal Medicine
Neurology
Neurology

## 2021-06-30 NOTE — CHART NOTE - NSCHARTNOTEFT_GEN_A_CORE
75 year old female was admitted for worsening dementia.    Patient requires frequent positioning of the body in ways not feasible with an ordinary bed due to her left above knee amputation.     Gel overlay mattress - patient will need a gel overlay to help of current skin breakdown and to prevent further skin breakdown. It is medically for patient to receive gel overlay due to his/her medical diagnosis.

## 2021-06-30 NOTE — PROGRESS NOTE ADULT - SUBJECTIVE AND OBJECTIVE BOX
DANISHA JIMÉNEZ  75y  Female  ***My note supersedes ALL resident notes that I sign.  My corrections for their notes are in my note.***    I can be reached directly on [facility] [id number]. My office number is [phone]. My personal cell number is [phone].    INTERVAL EVENTS: Here for f/u of dementia. RN reported that pt did not sleep well last night. There was also reports of visual hallucinations (saw roaches crawling and saw a "baby" in the room). Today, pt denies the hallucinations.    T(F): 98.5 (06-30-21 @ 04:55), Max: 99.3 (06-29-21 @ 17:36)  HR: 94 (06-30-21 @ 04:55) (94 - 119)  BP: 105/52 (06-30-21 @ 04:55) (105/52 - 140/66)  RR: 18 (06-30-21 @ 04:55) (18 - 18)  SpO2: 95% (06-30-21 @ 07:55) (95% - 95%)    Gen: NAD; seems calm; speaks well; has mild baseline confusion  HEENT: PERRL, EOMI, mouth clr, nose clr  Neck: no nodes, no JVD, thyroid nl  lungs: clr  hrt: s1 s2 rrr no murmur  abd: soft, NT/ND, no HS megaly  ext: no edema rt leg, no c/c  lt leg w/ aka; well healed scar; no red, not hot, no swelling, but mild tenderness (less if distracted)  neuro: aa ox2 (not well to time), cn intact, can move all 3 ext (can move stump on left side)    LABS:                      11.7    (    91.1   11.94 )-----------( ---------      184      ( 30 Jun 2021 05:54 )             36.7    (    13.8     139   (   107   (   129      06-30-21 @ 05:54  ----------------------               4.0   (   22   (   11                             -----                        0.8  Ca  10.4   Mg  1.8    P   --     LFT  6.3  (  0.6  (  16       06-30-21 @ 05:54  -------------------------  3.7  (  126  (  14    RADIOLOGY & ADDITIONAL TESTS:  < from: MR Head No Cont (06.29.21 @ 17:05) >  IMPRESSION:    1.  No evidence of recent infarct or acute intracranial hemorrhage.    2.  Severe chronic microvascular changes and parenchymal atrophy, not significantly changed since the previous brain MRI dated 5/8/2014.    < end of copied text >    MEDICATIONS:    acetaminophen   Tablet .. 650 milliGRAM(s) Oral every 6 hours PRN  amLODIPine   Tablet 5 milliGRAM(s) Oral daily  aspirin  chewable 81 milliGRAM(s) Oral daily  atorvastatin 40 milliGRAM(s) Oral at bedtime  chlorhexidine 4% Liquid 1 Application(s) Topical <User Schedule>  clonazePAM  Tablet 0.5 milliGRAM(s) Oral at bedtime  diVALproex  milliGRAM(s) Oral two times a day  enoxaparin Injectable 40 milliGRAM(s) SubCutaneous daily  folic acid 1 milliGRAM(s) Oral daily  gabapentin 100 milliGRAM(s) Oral every 8 hours  melatonin 10 milliGRAM(s) Oral at bedtime  metoprolol succinate ER 25 milliGRAM(s) Oral daily  QUEtiapine 100 milliGRAM(s) Oral at bedtime  QUEtiapine 25 milliGRAM(s) Oral every 8 hours PRN  traMADol 25 milliGRAM(s) Oral every 8 hours PRN
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
## 2021-10-06 PROBLEM — I10 ESSENTIAL HYPERTENSION: Status: ACTIVE | Noted: 2021-01-06

## 2021-10-27 PROBLEM — Z86.73 HISTORY OF TIA (TRANSIENT ISCHEMIC ATTACK): Status: ACTIVE | Noted: 2020-11-24

## 2021-10-27 PROBLEM — E53.8 VITAMIN B12 DEFICIENCY: Status: ACTIVE | Noted: 2021-03-02

## 2021-10-27 PROBLEM — E53.8 FOLATE DEFICIENCY: Status: ACTIVE | Noted: 2021-03-02

## 2021-10-27 NOTE — ASSESSMENT
[FreeTextEntry1] : 76 year old woman with history of hypertension, TIA, CVA and CAD is here for follow up visit. Her diagnosis is most likely AD versus vascular dementia with behavioral symptoms. Noted to have epileptiform discharged on VEEG when she was admitted to hospital. Currently has 24/7 home aid. \par \par # AD/vascular dementia \par - cw Seroquel to 100 mg qhs and 25 mg TID PRN \par -cw Lexapro 10 mg \par \par \par # Vitamin B12 deficiency \par -Currently on PO supplement. \par - Continue folic acid \par \par #TIA/CVA \par - Continue ASA and Lipitor \par \par # History of unresponsiveness \par - Seizure potentials on VEEG\par - Continue Depakote 250 mg BID\par \par #Recurrent UTI: \par - Check UA and UC \par \par RTC in 6 months \par

## 2021-10-27 NOTE — HISTORY OF PRESENT ILLNESS
[FreeTextEntry1] : DANISHA JIMÉNEZ is a 76 year old woman is here as a follow up for dementia with behavioral changes. She was last seen on June when Seroquel increased to 50 mg qhs and 25 mg PRN. Overtime due to worsening agitation Seroquel dose increased to 100 mg QHS and 25 mg TID as needed. She was admitted to [facility] for worsening confusion and agitation.R EEG showed  mid to post sharp transient spike and VEEG showed rare right posterior temporal/parietal sharps that appear epileptiform in nature. She was started on Depakote 250 mg BID. She also takes Clonazepam 0.5 mg qhs and is on Lexapro 10 mg. \par Today she presented to office  with her son. Reports episode of worsening confusion when she is positive for UTI. Otherwise she has good days and bad days. Short term memory is poor.

## 2021-11-25 NOTE — ED PROCEDURE NOTE - CPROC ED PHYSICIAN PRESENCE1
"I broke my nail" c/o broken right hand 3rd digit nail pain, bleeding noted I was present during the key portion of the procedure.

## 2021-11-25 NOTE — H&P ADULT - NSHPPHYSICALEXAM_GEN_ALL_CORE
PE:  General: Thin elderly female, sedated on Propofol gtt, on MV.   Skin: Scattered ecchymosis b/l UE  Cardiovascular: Regular rate and rhythm, no murmurs, rubs, or gallops.  Pulmonary: Anterior breath sounds clear bilaterally, no crackles or wheezing. No use of accessory muscles  GI: Abdomen soft, non-distended, non-tender  Extremities: Radial and DP pulses +2, L AKA, no edema RLE    Neuro:  Mental status: Sedated on 30mcg/kg/hr Propofol. No EO. Localizes to noxious w/ b/l UE. No command following.  Cranial nerves: PERRL, gaze midline. No BTT b/l. Face grossly symmetric, limited assessment due to ETT. Cough/corneals intact.  Motor: Localizes to noxious stimuli w/ b/l UE.   Sensation: Grimaces to noxious stimuli of all extremities. PE:  General: Thin elderly female, sedated on Propofol gtt, on MV.   Skin: Scattered ecchymosis b/l UE  Cardiovascular: Regular rate and rhythm, no murmurs, rubs, or gallops.  Pulmonary: Anterior breath sounds clear bilaterally, no crackles or wheezing. No use of accessory muscles  GI: Abdomen soft, non-distended, non-tender  Extremities: Radial and DP pulses +2, L AKA, no edema RLE    Neuro:  Mental status: Sedated on 30mcg/kg/hr Propofol. No EO. Localizes to noxious w/ b/l UE. No command following.  Cranial nerves: PERRL, gaze midline. No BTT b/l. Face grossly symmetric, limited assessment due to ETT. Cough/corneals intact.  Motor: Moves all extremities AG  Sensation: Grimaces to noxious stimuli of all extremities.

## 2021-11-25 NOTE — H&P ADULT - NSICDXPASTMEDICALHX_GEN_ALL_CORE_FT
PAST MEDICAL HISTORY:  Chronic GERD     Dementia     Folate deficiency     History of TIA (transient ischemic attack)     Hypertension     Seizure disorder     Vitamin B12 deficiency

## 2021-11-25 NOTE — ED ADULT NURSE NOTE - OBJECTIVE STATEMENT
Patient presents with acute onset of L side weakness and droop. Patient a&ox3 at baseline, currently verbally unresponsive.
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
## 2021-11-25 NOTE — ED PROVIDER NOTE - OBJECTIVE STATEMENT
76 y f, pmh of dementia, htn, hld, two prior cva, residual right facial droop (mild), pw left sided gaze and weakness. Last know normal 1700.  Symptoms started 20 min when pt was watching tv. Pt suddenly started having left arm stiffness and was not able to say anything to son.  Pt on aspirin no other blood thinners. At baseline, pt ambulates and is Aaox3. Pt is alert but not answering questions. +1 vomiting in ED.
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
## 2021-11-25 NOTE — H&P ADULT - NSHPLABSRESULTS_GEN_ALL_CORE
12.7   6.77  )-----------( 197      ( 25 Nov 2021 18:32 )             40.4   11-25    139  |  105  |  14  ----------------------------<  125<H>  3.7   |  20  |  0.9    Ca    9.3      25 Nov 2021 18:32    TPro  6.7  /  Alb  4.1  /  TBili  0.5  /  DBili  x   /  AST  14  /  ALT  11  /  AlkPhos  161<H>  11-25    PT/INR - ( 25 Nov 2021 18:32 )   PT: 12.40 sec;   INR: 1.08 ratio         PTT - ( 25 Nov 2021 18:32 )  PTT:54.4 sec    CARDIAC MARKERS ( 25 Nov 2021 18:32 )  x     / <0.01 ng/mL / x     / x     / x        ABG - ( 25 Nov 2021 21:01 )  pH, Arterial: 7.35  pH, Blood: x     /  pCO2: 42    /  pO2: 451   / HCO3: 23    / Base Excess: -2.4  /  SaO2: 100.0         EXAM:  CT BRAIN STROKE PROTOCOL          PROCEDURE DATE:  11/25/2021      IMPRESSION:    No evidence of acute intracranial hemorrhage or acute territorial infarct.    Stable severe chronic microvascular ischemic changes.        ******PRELIMINARY REPORT******    ******PRELIMINARY REPORT******          EXAM:  CT ANGIO BRAIN (W)AW IC        EXAM:  CT ANGIO NECK (W)AW IC        EXAM:  CT PERFUSION W MAPS IC        PROCEDURE DATE:  11/25/2021        IMPRESSION:    No evidence of major vascular stenosis or occlusion.    Normal perfusion images of the brain.      ******PRELIMINARY REPORT******    ******PRELIMINARY REPORT******

## 2021-11-25 NOTE — ED PROVIDER NOTE - PROGRESS NOTE DETAILS
Patient presents with stroke like symptoms. Last known well 5pm. Stroke code called on arrival. normal FS. Answering some questions but difficulty to understand, not following commands, left hemiparesis and left gaze preference. CT negative for hemorrhage. Perfusion done. On return from imaging patient reassessed, mentation improved and following commands. Tele stroke screen initiated. On reassessment patient found to be hypoxic to 90% and no longer speaking. Patient intubated for airway protection. As per Dr. Melendrez who assessed via tele stroke screen recommending TPA. Imaging reviewed and patient perfusion imaging negative. Confirmed with Dr. Melendrez to continue with TPA. Verbal consent obtained from family (son Emerson) for tpa, risks discussed. TPA given. Neurology resident bedside throughout. OG : per roland Aguilar for Neuro ICU under [place]

## 2021-11-25 NOTE — CONSULT NOTE ADULT - TIME BILLING
included review of relevant history, clinical examination, review of data and neuro-images, discussion of treatment with the multidisciplinary team and any consultants involved in this patient’s care as well as family discussion.

## 2021-11-25 NOTE — H&P ADULT - ATTENDING COMMENTS
History, events, data and & scans reviewed. I agreed and approved plan of care over phone and examined patient at bedside on 11/26/2021

## 2021-11-25 NOTE — ED PROVIDER NOTE - CLINICAL SUMMARY MEDICAL DECISION MAKING FREE TEXT BOX
Patient presents with stroke like symptoms. Stroke code activated. Left sided deficits. On reassessment patient more hypoxic and no longer answering questions. Patient intubated for airway protection. Tele stroke set up and as per Dr. Melendrez recommending TPA. TPA given. Neuro crit consulted. Patient admitted to neuro ICU for further management.

## 2021-11-25 NOTE — PROCEDURE NOTE - NSINFORMCONSENT_GEN_A_CORE
Verbal consent from son/Benefits, risks, and possible complications of procedure explained to patient/caregiver who verbalized understanding and gave verbal consent.

## 2021-11-25 NOTE — ED PROVIDER NOTE - PHYSICAL EXAMINATION
CONSTITUTIONAL: NAD  SKIN: Warm dry  HEAD: NCAT  EYES: NL inspection  ENT: MMM  NECK: Supple; non tender.  CARD: RRR  RESP: CTAB  ABD: S, ND  EXT: no pedal edema  NEURO: moving right arm. +left gaze. Not answering questions/following command.
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
## 2021-11-26 NOTE — DIETITIAN INITIAL EVALUATION ADULT. - ALTERNATE MEANS OF NUTRITION
No indication for predigested/specialized formula. Noted extra kcal from propofol. Replete to meet est needs./initiate tube feeding If PO diet not warranted, initiate TFs. No indication for predigested/specialized formula. Noted extra kcal from propofol. Replete to meet est needs.

## 2021-11-26 NOTE — PHARMACOTHERAPY INTERVENTION NOTE - COMMENTS
Famotidine IVPB ordered; on shortage. rec to change to PO or iv push. Also to change to BID as renal function stable, should be bid for prophylaxis dosing

## 2021-11-26 NOTE — PHARMACOTHERAPY INTERVENTION NOTE - COMMENTS
Pt 75 yo, Scr 0.8, 44 kg.    Given the size and h/o nephrectomy, rec to consider dose reduce of keppra. Per MD, ok to continue 1g iv q12hr at this time
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
## 2021-11-26 NOTE — EEG REPORT - NS EEG TEXT BOX
Prelim VEEG review:   1 Burst suppression with GPDs ( generalized periodic epileptiform discharges).   2. PLDs and focal slowing in the left temporal region   3. Bursts of posterior fast activity , Left occipital > R occipital   4. No seizures
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
## 2021-11-26 NOTE — DIETITIAN INITIAL EVALUATION ADULT. - OTHER INFO
Pertinent Medical Interventions  Continue Propofol gtt at 30mcg/kg/hr  Start Keppra 1g BID  SBT as tolerated  NPO for now  Swallow eval when extubated  Bowel regimen  Goal euglycemia (-180)    IBW adjusted for LLE AKA 38.4kg. %CBW/QRT=785%.

## 2021-11-26 NOTE — DIETITIAN INITIAL EVALUATION ADULT. - PERTINENT LABORATORY DATA
(11/26) WBC 12.13, Na 145, K 3.5, Cl 113, BUN 11, creat 0.7, gluc 104, elevated LFTs, A1C 5.7, chol 111, trig 92

## 2021-11-26 NOTE — DIETITIAN INITIAL EVALUATION ADULT. - ENTERAL
Replete to start at 15ml/hr and advance to goal 20ml/hr in 4 hours. Flush 100ml Q6H. -- provides 840kcal, 54g protein, 735+400ml free water. Replete to start at 15ml/hr and advance to goal 20ml/hr in 4 hours. Add No carb prosource TF 1pck QD. Flush 100ml Q6H. -- provides 880kcal, 65g protein, 735+400ml free water. If pt to start on TFs- Replete formula to start at 15ml/hr and advance to goal 35ml/hr in 4 hours. Add No carb prosource TF 1pck QD. Flush 100ml Q6H. -- provides 880kcal, 65g protein, 735+400ml free water. Replete formula to start at 15ml/hr and advance to goal 35ml/hr in 4 hours. Add No carb prosource TF 1pck QD. Flush 100ml Q6H. -- provides 880kcal, 65g protein, 735+400ml free water.

## 2021-11-26 NOTE — PATIENT PROFILE ADULT - LANGUAGE ASSISTANCE NEEDED
pt intubated, pt son present at bedside/No-Patient/Caregiver offered and refused free interpretation services.
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
## 2021-11-27 NOTE — OCCUPATIONAL THERAPY INITIAL EVALUATION ADULT - SPECIFY REASON(S)
Pt remains intubated and sedated per conversation with RN. OT to cont when appropriate.
Case discussed in Neurocrit IDT rounds, per team, pt not medically cleared to participate in OT today. Pt intubated. Will hold and initiate OT when pt medically cleared.
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
## 2021-11-28 NOTE — EEG REPORT - NS EEG TEXT BOX
prelim VEEG review:   1. SImilar background as earlier recording   2. Burst suppression no longer present   3.  GPDs ( generalized periodic epileptiform discharges) typically present at 1-2 Hz  4, No electrographic seizures.
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
## 2021-11-29 NOTE — OCCUPATIONAL THERAPY INITIAL EVALUATION ADULT - MANUAL MUSCLE TESTING RESULTS, REHAB EVAL
at least 3/5 bue. unable to formally assess 2/2 cognition. Pt able to maintain shoulder flexion and grasp/release with bilatteral hands
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
## 2021-11-29 NOTE — CHART NOTE - NSCHARTNOTEFT_GEN_A_CORE
MD informed by RN that patient's Lipitor dose was adjusted today from 80mg to 10mg.  Will give the 10mg at bedtime as currently prescribed.  Day team, please verify correct dose.  Thank you!
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
## 2021-11-29 NOTE — PHYSICAL THERAPY INITIAL EVALUATION ADULT - BALANCE TRAINING, PT EVAL
increase S/D sitting balance by 1/2 grade by discharge to promote safety awareness and decrease risk of falling.

## 2021-11-29 NOTE — PHYSICAL THERAPY INITIAL EVALUATION ADULT - GAIT TRAINING, PT EVAL
Pt will ambulate using RW or least restrictive AD for 10 ft with Max A using L prosthesis by discharge to facilitate return to PLOF.

## 2021-11-29 NOTE — OCCUPATIONAL THERAPY INITIAL EVALUATION ADULT - MUSCLE TONE ASSESSMENT, REHAB EVAL
rle rigid and not able to been. Unable to determine if tone or non compliance with exam. NP made aware.

## 2021-11-29 NOTE — EEG REPORT - NS EEG TEXT BOX
Epilepsy Attending Note:     DANISHA JIMÉNEZ    76y Female  MRN [id number]    Vital Signs Last 24 Hrs  T(C): 37.1 (29 Nov 2021 04:00), Max: 37.9 (28 Nov 2021 21:00)  T(F): 98.8 (29 Nov 2021 04:00), Max: 100.3 (28 Nov 2021 21:00)  HR: 92 (29 Nov 2021 07:00) (89 - 120)  BP: 137/66 (29 Nov 2021 07:00) (108/71 - 149/62)  BP(mean): 98 (29 Nov 2021 07:00) (77 - 104)  RR: 21 (29 Nov 2021 07:00) (16 - 33)  SpO2: 97% (29 Nov 2021 08:50) (94% - 100%)                          12.0   13.92 )-----------( 191      ( 29 Nov 2021 05:20 )             36.9       11-29    144  |  112<H>  |  10  ----------------------------<  107<H>  4.1   |  18  |  0.7    Ca    9.4      29 Nov 2021 05:20  Phos  2.6     11-29  Mg     2.1     11-29    TPro  6.4  /  Alb  3.8  /  TBili  0.8  /  DBili  x   /  AST  15  /  ALT  45<H>  /  AlkPhos  135<H>  11-29      MEDICATIONS  (STANDING):  albuterol/ipratropium for Nebulization 3 milliLiter(s) Nebulizer every 6 hours  aspirin  chewable 81 milliGRAM(s) Oral daily  atorvastatin 80 milliGRAM(s) Oral at bedtime  chlorhexidine 4% Liquid 1 Application(s) Topical <User Schedule>  famotidine    Tablet 20 milliGRAM(s) Oral every 12 hours  heparin   Injectable 5000 Unit(s) SubCutaneous every 12 hours  levETIRAcetam  IVPB 1000 milliGRAM(s) IV Intermittent every 12 hours  senna 2 Tablet(s) Oral at bedtime    MEDICATIONS  (PRN):  acetaminophen  Suppository .. 650 milliGRAM(s) Rectal every 6 hours PRN Temp greater or equal to 38C (100.4F), Mild Pain (1 - 3), Moderate Pain (4 - 6)  racepinephrine  2.25% Inhalation 3 milliLiter(s) Inhalation once PRN Stridor            VEEG in the last 24 hours:      Background-- continues, reactive and less than optimally organized reaching frequencies in the range of 7-8 hz    Focal and generalized slowing----- 1- mild to moderate generalized slowing- 2- moderate left hemispheric mainly posterior quadrant slwoing    Interictal activity---------    1- frequent left posterior  or diffusely expressed sharp  waves often presented in runs of 1-2 hz periodic patten  . 2-  large number of  generalized sharprs rarely in a/1-2 seconds periodic pattern    Events-----------------  none    Seizures------------  none    Impression:  abnormal as above    Plan - as/neurology team
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
## 2021-11-29 NOTE — PHYSICAL THERAPY INITIAL EVALUATION ADULT - GENERAL OBSERVATIONS, REHAB EVAL
Pt was approached for PT IE. PT currently intubated/sedated, + VEEG. PT will follow up when appropriate.
13:20-13:50. chart reviewed. Pt received semi-pendleton at B/S, confused, oriented X 1, able to follow simple step instructions and agreeable to PT evaluation.  + VEEG, + B/L hand mitten, + B/L UE restraint, L AKA, L prothesis at B/S (no socks), + monitoring, + NG tube, on RA, stable vitals. denies pain or dysfunction, slight L UE weakness

## 2021-11-30 NOTE — SWALLOW BEDSIDE ASSESSMENT ADULT - SWALLOW EVAL: FUNCTIONAL LEVEL AT TIME OF EVAL
Pt will not likely intake enough to meet nutritional needs
Improved arousal and PO acceptance, NGT in situ

## 2021-11-30 NOTE — EEG REPORT - NS EEG TEXT BOX
Epilepsy Attending Note:     DANISHA JIMÉNEZ    76y Female  MRN [id number]    Vital Signs Last 24 Hrs  T(C): 37.7 (30 Nov 2021 07:55), Max: 37.7 (30 Nov 2021 07:55)  T(F): 99.8 (30 Nov 2021 07:55), Max: 99.8 (30 Nov 2021 07:55)  HR: 94 (30 Nov 2021 07:55) (88 - 112)  BP: 92/50 (30 Nov 2021 07:55) (92/50 - 149/68)  BP(mean): 64 (30 Nov 2021 07:55) (64 - 102)  RR: 20 (30 Nov 2021 07:55) (20 - 23)  SpO2: 96% (30 Nov 2021 04:27) (96% - 97%)                          11.3   8.90  )-----------( 211      ( 30 Nov 2021 06:20 )             34.8       11-30    145  |  112<H>  |  18  ----------------------------<  109<H>  3.7   |  20  |  0.8    Ca    10.0      30 Nov 2021 06:20  Phos  2.6     11-30  Mg     2.0     11-30    TPro  6.2  /  Alb  3.4<L>  /  TBili  0.7  /  DBili  x   /  AST  16  /  ALT  34  /  AlkPhos  123<H>  11-30      MEDICATIONS  (STANDING):  albuterol/ipratropium for Nebulization 3 milliLiter(s) Nebulizer every 6 hours  amLODIPine   Tablet 5 milliGRAM(s) Oral daily  aspirin  chewable 81 milliGRAM(s) Oral daily  atorvastatin 10 milliGRAM(s) Oral at bedtime  chlorhexidine 4% Liquid 1 Application(s) Topical <User Schedule>  cyanocobalamin 500 MICROGram(s) Oral daily  folic acid 1 milliGRAM(s) Oral daily  gabapentin Solution 100 milliGRAM(s) Oral three times a day  heparin   Injectable 5000 Unit(s) SubCutaneous every 12 hours  levETIRAcetam  IVPB 1000 milliGRAM(s) IV Intermittent every 12 hours  metoprolol tartrate 25 milliGRAM(s) Oral two times a day  piperacillin/tazobactam IVPB.. 3.375 Gram(s) IV Intermittent every 8 hours  QUEtiapine 25 milliGRAM(s) Oral three times a day  senna 2 Tablet(s) Oral at bedtime    MEDICATIONS  (PRN):  acetaminophen  Suppository .. 650 milliGRAM(s) Rectal every 6 hours PRN Temp greater or equal to 38C (100.4F), Mild Pain (1 - 3), Moderate Pain (4 - 6)            VEEG in the last 24 hours:    Background------------------  continues , reactive reaching frequencies in the range of 7-8 hz that is superimposed by low amplitude theta     Focal and generalized slowing-------  1- bilateral posterior focal slowing left > right.  2- mild to moderate generalized slowing    Interictal activity----------   frequent left posterior quadrant or diffuse left hemispheric spikes at times seen diffusely over both hemispheres.     Events-----------  none    Seizures---------- none    Impression: abnormal as above    Plan - - as/Neurology team
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
## 2021-11-30 NOTE — PROGRESS NOTE ADULT - SUBJECTIVE AND OBJECTIVE BOX
Pt seen and examined at bedside.         VITAL SIGNS (Last 24 hrs):  T(C): 37.7 (11-30-21 @ 07:55), Max: 37.7 (11-30-21 @ 07:55)  HR: 94 (11-30-21 @ 07:55) (88 - 112)  BP: 92/50 (11-30-21 @ 07:55) (92/50 - 149/68)  RR: 20 (11-30-21 @ 07:55) (20 - 23)  SpO2: 96% (11-30-21 @ 04:27) (96% - 97%)  Wt(kg): --  Daily     Daily     I&O's Summary    29 Nov 2021 07:01  -  30 Nov 2021 07:00  --------------------------------------------------------  IN: 820 mL / OUT: 0 mL / NET: 820 mL        PHYSICAL EXAM:  GENERAL: NAD   HEAD:  Atraumatic, Normocephalic  EYES:   conjunctiva and sclera clear  NECK: Supple, No JVD  CHEST/LUNG: Clear to auscultation bilaterally; No wheeze  HEART: Regular rate and rhythm; No murmurs, rubs, or gallops  ABDOMEN: Soft, Nontender, Nondistended; Bowel sounds present  EXTREMITIES:  2+ Peripheral Pulses, No clubbing, cyanosis, or edema  SKIN: No rashes or lesions    Labs Reviewed       CBC Full  -  ( 30 Nov 2021 06:20 )  WBC Count : 8.90 K/uL  Hemoglobin : 11.3 g/dL  Hematocrit : 34.8 %  Platelet Count - Automated : 211 K/uL  Mean Cell Volume : 89.2 fL  Mean Cell Hemoglobin : 29.0 pg  Mean Cell Hemoglobin Concentration : 32.5 g/dL  Auto Neutrophil # : 6.31 K/uL  Auto Lymphocyte # : 1.46 K/uL  Auto Monocyte # : 0.77 K/uL  Auto Eosinophil # : 0.28 K/uL  Auto Basophil # : 0.05 K/uL  Auto Neutrophil % : 70.9 %  Auto Lymphocyte % : 16.4 %  Auto Monocyte % : 8.7 %  Auto Eosinophil % : 3.1 %  Auto Basophil % : 0.6 %    BMP:    11-30 @ 06:20    Blood Urea Nitrogen - 18  Calcium - 10.0  Carbond Dioxide - 20  Chloride - 112  Creatinine - 0.8  Glucose - 109  Potassium - 3.7  Sodium - 145     MEDICATIONS  (STANDING):  albuterol/ipratropium for Nebulization 3 milliLiter(s) Nebulizer every 6 hours  amLODIPine   Tablet 5 milliGRAM(s) Oral daily  aspirin  chewable 81 milliGRAM(s) Oral daily  atorvastatin 10 milliGRAM(s) Oral at bedtime  chlorhexidine 4% Liquid 1 Application(s) Topical <User Schedule>  cyanocobalamin 500 MICROGram(s) Oral daily  folic acid 1 milliGRAM(s) Oral daily  gabapentin Solution 100 milliGRAM(s) Oral three times a day  heparin   Injectable 5000 Unit(s) SubCutaneous every 12 hours  levETIRAcetam  IVPB 1000 milliGRAM(s) IV Intermittent every 12 hours  metoprolol tartrate 25 milliGRAM(s) Oral two times a day  piperacillin/tazobactam IVPB.. 3.375 Gram(s) IV Intermittent every 8 hours  QUEtiapine 25 milliGRAM(s) Oral three times a day  senna 2 Tablet(s) Oral at bedtime    MEDICATIONS  (PRN):  acetaminophen  Suppository .. 650 milliGRAM(s) Rectal every 6 hours PRN Temp greater or equal to 38C (100.4F), Mild Pain (1 - 3), Moderate Pain (4 - 6)

## 2021-11-30 NOTE — SWALLOW BEDSIDE ASSESSMENT ADULT - NS SPL SWALLOW CLINIC TRIAL FT
Mod oral dysphagia with soft solids, Mild oral dysphagia with minced & moist likely impacted by cognition, + toleration for soft, minced & moist, puree, mildly thick and thin liquids without obvert s/s of aspiration/penetration.
+minimal po acceptance, pt reporting "I don't want it", +overt s/s of penetration/aspiration w/ thin liquids, +toleration of mildly thick and puree w/o immediate overt s/s of penetration/aspiration

## 2021-11-30 NOTE — CONSULT NOTE ADULT - ASSESSMENT
IMP:  - seizure  - acute resp failure -resolved  - h/o L nephrectomy, LLE AKA  - dysphagia    est REE by MSJ and HBE, now that pt is extubated, is < 1000 kcal/d and protein ~ 60 gm /d.  Active seizure activity might increase this. pt currently on EEG.  Now that pt is extubated,  would not give NG feeds by continuous drip.  suggest Replete 250 ml over 30-40 min x 4 meal-pattern feeds/d --> 1000 kcal and 60 gm protein/d  f/u phos - suggest repleting to > 3.5,e sp as she is leaving ICU. Would give one packet Neutra-Phos with each of the next 3 feeds and f/u level in am.
IMPRESSION: Rehab of left AKA / L HP    PRECAUTIONS: [   ] Cardiac  [   ] Respiratory  [   ] Seizures [   ] Contact Isolation  [   ] Droplet Isolation  [   ] Other    Weight Bearing Status:     RECOMMENDATION:    Out of Bed to Chair     DVT/Decubiti Prophylaxis    REHAB PLAN:     [  x  ] Bedside P/T 3-5 times a week   [  x  ]   Bedside O/T  2-3 times a week             [    ] Speech Therapy               [    ]  No Rehab Therapy Indicated   Conditioning/ROM                                    ADL  Bed Mobility                                               Conditioning/ROM  Transfers                                                     Bed Mobility  Sitting /Standing Balance                         Transfers                                        Gait Training                                               Sitting/Standing Balance  Stair Training [   ]Applicable                    Home equipment Eval                                                                        Splinting  [   ] Only      GOALS:   ADL   [    ]   Independent                    Transfers  [    ] Independent                          Ambulation  [    ] Independent     [  x   ] With device                            [ x   ]  CG                                                         [  x  ]  CG                                                                  [  x  ] CG                            [    ] Min A                                                   [    ] Min A                                                              [    ] Min  A          DISCHARGE PLAN:   [    ]  Good candidate for Intensive Rehabilitation/Hospital based                                             Will tolerate 3hrs Intensive Rehab Daily                                       [ x    ]  Short Term Rehab in Skilled Nursing Facility                               vs        [   x  ]  Home with Outpatient or VN services                                         [     ]  Possible Candidate for Intensive Hospital based Rehab                                       
75 yo F with multiple stroke risk factors, presented with aphasia, L sided weakness and gaze deviation;  no LVO.  Acute resp failure due to acute encephalopathy.    Plan:  would recommend to administer tPA, bolus followed by infusion;   although seizures are on differential, pt is a high risk for acute ischemic stroke, presents with focal neuroSx;  negative CTp noted but study is performed early within the first hour after the Sx development with likely diminished diagnostic potential.  No LVO - not a candidate for endovascular intervention.  On-call Neurology involvement recommended, as well as cEEG to r/o seizures, incl. non-clinical.  Please, maintain BP <180/105 but avoid hypotension.    Case discussed with [facility] ED team.
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
## 2021-12-01 NOTE — ADVANCED PRACTICE NURSE CONSULT - RECOMMEDATIONS
Plan: Discontinue pressure injury perimeter   Continue Pressure  injury  preventive  measures   Continue skin care   Assess wound and inform primary provider of any changes   Case discussed with primary Rn  Wound/ ostomy specialist  to f/u as needed     Offloading: [x ] Frequent position changes [ ] Devices/Equipment  Cleansing: [ ] Saline [x ] Soap/Water [ ] Other: ______  Topicals: [ x] Barrier Cream [ ] Antimicrobial [ ] Enzymatic Wound Debridement  Dressings: [ ] Dry, sterile [ ] Foam [ ] Absorbant Pads [ ] Collagenase      
Oriented - self; Oriented - place; Oriented - time

## 2021-12-01 NOTE — PROGRESS NOTE ADULT - SUBJECTIVE AND OBJECTIVE BOX
DANISHA JIMÉNEZ  76y Female    CHIEF COMPLAINT:    Patient is a 76y old  Female who presents with a chief complaint of CVA (01 Dec 2021 09:42)    INTERVAL HPI/OVERNIGHT EVENTS:    Patient seen and examined. No acute events overnight. Remains confused, able to answer simple questions     ROS: All other systems are negative.    Vital Signs:    T(F): 98.1 (12-01-21 @ 08:01), Max: 99.7 (12-01-21 @ 00:06)  HR: 95 (12-01-21 @ 08:01) (95 - 118)  BP: 118/58 (12-01-21 @ 08:01) (102/62 - 118/58)  RR: 20 (12-01-21 @ 08:01) (20 - 20)  SpO2: 96% (12-01-21 @ 05:06) (96% - 98%)    30 Nov 2021 07:01  -  01 Dec 2021 07:00  --------------------------------------------------------  IN: 300 mL / OUT: 1600 mL / NET: -1300 mL    PHYSICAL EXAM:    GENERAL:  NAD  SKIN: No rashes or lesions  HEENT: Atraumatic. Normocephalic.    NECK: Supple, No JVD.    PULMONARY: CTA B/L. No wheezing. No rales  CVS: Normal S1, S2. Rate and Rhythm are regular.  ABDOMEN/GI: Soft, Nontender, Nondistended.  MSK:  No clubbing or cyanosis , s/p L AKA  NEUROLOGIC:  Moves all extremities   PSYCH: Awake, confused, answers simple questions appropriately     Consultant(s) Notes Reviewed:  [x ] YES  [ ] NO  Care Discussed with Consultants/Other Providers [ x] YES  [ ] NO    LABS:                        11.3   8.90  )-----------( 211      ( 30 Nov 2021 06:20 )             34.8     145  |  112<H>  |  18  ----------------------------<  109<H>  3.7   |  20  |  0.8    Ca    10.0      30 Nov 2021 06:20  Phos  2.6     11-30  Mg     2.0     11-30    TPro  6.2  /  Alb  3.4<L>  /  TBili  0.7  /  DBili  x   /  AST  16  /  ALT  34  /  AlkPhos  123<H>  11-30    Culture - Blood (collected 29 Nov 2021 05:30)  Source: .Blood Blood  Preliminary Report (30 Nov 2021 14:01):    No growth to date.    RADIOLOGY & ADDITIONAL TESTS:  Imaging or report Personally Reviewed:  [x] YES  [ ] NO  EKG reviewed: [x] YES  [ ] NO    Medications:  Standing  albuterol/ipratropium for Nebulization 3 milliLiter(s) Nebulizer every 6 hours  amLODIPine   Tablet 5 milliGRAM(s) Oral daily  aspirin  chewable 81 milliGRAM(s) Oral daily  atorvastatin 10 milliGRAM(s) Oral at bedtime  chlorhexidine 4% Liquid 1 Application(s) Topical <User Schedule>  cyanocobalamin 500 MICROGram(s) Oral daily  folic acid 1 milliGRAM(s) Oral daily  gabapentin Solution 100 milliGRAM(s) Oral three times a day  heparin   Injectable 5000 Unit(s) SubCutaneous every 12 hours  levETIRAcetam  IVPB 1000 milliGRAM(s) IV Intermittent every 12 hours  metoprolol tartrate 25 milliGRAM(s) Oral two times a day  piperacillin/tazobactam IVPB.. 3.375 Gram(s) IV Intermittent every 8 hours  QUEtiapine 25 milliGRAM(s) Oral three times a day  senna 2 Tablet(s) Oral at bedtime  tamsulosin 0.8 milliGRAM(s) Oral at bedtime  traMADol 25 milliGRAM(s) Oral once    PRN Meds  acetaminophen     Tablet .. 650 milliGRAM(s) Oral every 6 hours PRN  traMADol 25 milliGRAM(s) Oral every 6 hours PRN
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
## 2021-12-01 NOTE — SWALLOW BEDSIDE ASSESSMENT ADULT - SWALLOW EVAL: RECOMMENDED DIET
Minced & moist diet w/ thin liquids.
regular w/ thin liquids
NGT, consider trial of puree and mildly thick liquids, Pt may not intake enough 2' refusal, leave NGT in situ

## 2021-12-01 NOTE — SWALLOW BEDSIDE ASSESSMENT ADULT - CONSISTENCIES ADMINISTERED
3oz/thin liquid/mildly thick/pureed
thin liquid/pureed/soft & bite-sized/regular solid
3oz water, 3oz mildly thick, 3oz pure, minced/moist, 2oz soft/thin liquid/mildly thick/minced & moist/soft & bite-sized

## 2021-12-01 NOTE — CHART NOTE - NSCHARTNOTEFT_GEN_A_CORE
patient was  having tachycardia ( bpm)  patient seen and examined at bedside  vitals are significant for only  the HR  (other vital signs are WNL)  patient complains of pain along her whole body,  mainly  left shoulder and right knee  EKG done showed sinus tachycardia (no change from previous EKG)  tylenol and lidocaine patch -> no relief of pain  tramadol 25 mg once given  with relief of pain -> patient then slept but HR still elevated  no fever, chills, signs of bleeding, dyspnea  assessment: patient with asymptomatic sinus tachy (between 110 and 120 bpm). patient is already on metoprolol 25 BID  plan:  metoprolol 25 once   will reassess

## 2021-12-01 NOTE — SWALLOW BEDSIDE ASSESSMENT ADULT - SWALLOW EVAL: DIAGNOSIS
+toleration for regular solids, soft and bite sized, puree, and thin liquids w/o overt s/s aspiration/penetration
+minimal po acceptance, pt reporting "I don't want it", +overt s/s of penetration/aspiration w/ thin liquids, +toleration of mildly thick and puree w/o immediate overt s/s of penetration/aspiration
Mod oral dysphagia with soft solids, Mild oral dysphagia with minced & moist likely impacted by cognition, + toleration for soft, minced & moist, puree, mildly thick and thin liquids without obvert s/s of aspiration/penetration.
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
## 2021-12-01 NOTE — SWALLOW BEDSIDE ASSESSMENT ADULT - ORAL PHASE
Delayed oral transit time
with soft solids/Decreased anterior-posterior movement of the bolus/Delayed oral transit time
Within functional limits

## 2021-12-01 NOTE — SWALLOW BEDSIDE ASSESSMENT ADULT - NS ASR SWALLOW FINDINGS DISCUS
RN Yuko Jones/Physician/Nursing/Patient
RN MD Robert Huntley made aware./Physician/Nursing
Physician/Nursing/Patient

## 2021-12-01 NOTE — SWALLOW BEDSIDE ASSESSMENT ADULT - COMMENTS
CT Head 11.28.21 @ 17:39 No acute intracranial hemorrhage, mass effect or midline shift  Extensive chronic microvascular white matter changes.
[employer]  [place]ian-English ID# [id number] assisted w/ translation.
CT Head 11.28.21 @ 17:39 No acute intracranial hemorrhage, mass effect or midline shift  Extensive chronic microvascular white matter changes.

## 2021-12-01 NOTE — CHART NOTE - NSCHARTNOTEFT_GEN_A_CORE
Pt ordered for kcal count. Kcal count observed hung on the door of pt's room with record entered yesterday. RD will review once it is completed.  Pt under care of Nutrition Support Team. All nutrition interventions deferred to NST.
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
## 2021-12-01 NOTE — SWALLOW BEDSIDE ASSESSMENT ADULT - SLP GENERAL OBSERVATIONS
Pt received in bed awake, inconsistently responds to questions/commands, dysphonic, low vocal volume
Pt. received in semi-Workman's position, awake, ox1, confused, NGT in situ, clear speech.
pt received in bed awake alert w/ c/o head pain. RN/MD aware. +room air
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
## 2021-12-02 NOTE — PROGRESS NOTE ADULT - SUBJECTIVE AND OBJECTIVE BOX
DANISHA JIMÉNEZ  76y Female    CHIEF COMPLAINT:    Patient is a 76y old  Female who presents with a chief complaint of CVA (01 Dec 2021 11:10)    INTERVAL HPI/OVERNIGHT EVENTS:    Patient seen and examined. No acute events overnight. Overall unchanged     ROS: All other systems are negative.    Vital Signs:    T(F): 98.1 (12-02-21 @ 11:32), Max: 98.6 (12-02-21 @ 04:37)  HR: 75 (12-02-21 @ 11:32) (75 - 104)  BP: 120/59 (12-02-21 @ 11:32) (100/70 - 123/59)  RR: 18 (12-02-21 @ 11:32) (18 - 20)  SpO2: 95% (12-02-21 @ 11:32) (95% - 99%)    01 Dec 2021 07:01  -  02 Dec 2021 07:00  --------------------------------------------------------  IN: 800 mL / OUT: 1550 mL / NET: -750 mL    02 Dec 2021 07:01  -  02 Dec 2021 16:46  --------------------------------------------------------  IN: 0 mL / OUT: 600 mL / NET: -600 mL    PHYSICAL EXAM:    GENERAL:  NAD  SKIN: No rashes or lesions  HEENT: Atraumatic. Normocephalic.   NECK: Supple, No JVD. No lymphadenopathy.  PULMONARY: CTA B/L. No wheezing. No rales  CVS: Normal S1, S2. Rate and Rhythm are regular.    ABDOMEN/GI: Soft, Nontender, Nondistended; BS present  MSK:  No edema B/L LE. s/p L AKA  NEUROLOGIC:  moves all extremities  PSYCH: Alert & oriented x 1, able to asnwer simple questions     Consultant(s) Notes Reviewed:  [x ] YES  [ ] NO  Care Discussed with Consultants/Other Providers [ x] YES  [ ] NO    LABS:                        10.4   7.50  )-----------( 222      ( 02 Dec 2021 11:00 )             31.8     142  |  109  |  11  ----------------------------<  106<H>  3.7   |  20  |  0.9    Ca    10.1      02 Dec 2021 11:00  Phos  3.1     12-02  Mg     1.8     12-02    TPro  5.8<L>  /  Alb  3.3<L>  /  TBili  0.4  /  DBili  x   /  AST  11  /  ALT  19  /  AlkPhos  97  12-02    RADIOLOGY & ADDITIONAL TESTS:  Imaging or report Personally Reviewed:  [x] YES  [ ] NO  EKG reviewed: [x] YES  [ ] NO    Medications:  Standing  amLODIPine   Tablet 5 milliGRAM(s) Oral daily  aspirin  chewable 81 milliGRAM(s) Oral daily  atorvastatin 10 milliGRAM(s) Oral at bedtime  chlorhexidine 4% Liquid 1 Application(s) Topical <User Schedule>  cyanocobalamin 500 MICROGram(s) Oral daily  folic acid 1 milliGRAM(s) Oral daily  gabapentin Solution 100 milliGRAM(s) Oral three times a day  heparin   Injectable 5000 Unit(s) SubCutaneous every 12 hours  levETIRAcetam  IVPB 1000 milliGRAM(s) IV Intermittent every 12 hours  metoprolol tartrate 25 milliGRAM(s) Oral two times a day  piperacillin/tazobactam IVPB.. 3.375 Gram(s) IV Intermittent every 8 hours  polyethylene glycol 3350 17 Gram(s) Oral daily  QUEtiapine 25 milliGRAM(s) Oral three times a day  senna 2 Tablet(s) Oral at bedtime  tamsulosin 0.8 milliGRAM(s) Oral at bedtime    PRN Meds  acetaminophen     Tablet .. 650 milliGRAM(s) Oral every 6 hours PRN  traMADol 25 milliGRAM(s) Oral every 6 hours PRN
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
## 2021-12-02 NOTE — GOALS OF CARE CONVERSATION - ADVANCED CARE PLANNING - CONVERSATION DETAILS
Had conversation with patient's son, Emerson Velasquez.  Per son's wishes, patient to be full code with all necessary medical interventions, including cardiopulmonary resuscitation and invasive intubation if required.  Expressed desire for everything to be done to "get patient home."

## 2021-12-03 NOTE — DISCHARGE NOTE NURSING/CASE MANAGEMENT/SOCIAL WORK - PATIENT PORTAL LINK FT
You can access the FollowMyHealth Patient Portal offered by [facility] by registering at the following website: [email or web address]. By joining [facility]’s FollowMyHealth portal, you will also be able to view your health information using other applications (apps) compatible with our system.

## 2021-12-03 NOTE — PROGRESS NOTE ADULT - ATTENDING COMMENTS
Patient seen and examined and agree with above except as noted.  Patients history, notes, labs, imaging, vitals and meds reviewed personally.  Patient lethargic, intermittent following simple commands  Difficult to understand  Moving extremities spontaneously    Plan as above
Patient seen and examined and agree with above except as noted.  Patients history, notes, labs, imaging, vitals and meds reviewed personally.  Patient more alert today and answering some questions such as name and year of birth.  Doesn't know she is in the hospital or the date  Moving all extremities sensory unreliable  No facial, tongue midline, EOMI    Plan as above
Patient seen and examined and agree with above except as noted.  Patients history, notes, labs, imaging, vitals and meds reviewed personally.  Patient more alert today and following simple commands  Moving all extremities   Able to count fingers in both eyes  Sensory not reliable  Awaiting MRI brain to determine whether patient had stroke or initial presentation was seizure    Plan as above
History, events, data and scans reviewed, patient examined at bedside. I agree and approved plan of care as outlined above.  S/P IV tPA for neurologic deficit that may also be explained by prolonged seizure. Patient on propofol and EEG is negative. Will continue VEEG monitoring while weaning from propofol. Continue keppra: SBT when off propofol.  Discussed with patient's son at bedside.
History, events, data and scans reviewed. I examined patient at bedside. I agree and approved plan of care as outlined above. I highlighted pertinent edits/modifications.
History, events, data and scans reviewed, patient examined at bedside. I agree and approved plan of care as outlined above. I highlighted pertinent edits/modifications.
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
## 2021-12-03 NOTE — DISCHARGE NOTE NURSING/CASE MANAGEMENT/SOCIAL WORK - NSDCPEFALRISK_GEN_ALL_CORE
For information on Fall & Injury Prevention, visit: [email or web address] OR  [email or web address] OR  https://www.cdc.gov/steadi/patient.html

## 2021-12-03 NOTE — PROGRESS NOTE ADULT - SUBJECTIVE AND OBJECTIVE BOX
DANISHA JIMÉNEZ  76y Female    CHIEF COMPLAINT:    Patient is a 76y old  Female who presents with a chief complaint of CVA (03 Dec 2021 14:59)      INTERVAL HPI/OVERNIGHT EVENTS:    Patient seen and examined. No acute events overnight. Clinically improving     ROS: All other systems are negative.    Vital Signs:    T(F): 98.7 (12-03-21 @ 11:41), Max: 99.3 (12-02-21 @ 23:51)  HR: 77 (12-03-21 @ 11:41) (77 - 103)  BP: 119/60 (12-03-21 @ 11:41) (109/57 - 119/60)  RR: 18 (12-03-21 @ 11:41) (18 - 18)  SpO2: 97% (12-03-21 @ 11:41) (96% - 98%)    02 Dec 2021 07:01  -  03 Dec 2021 07:00  --------------------------------------------------------  IN: 200 mL / OUT: 2000 mL / NET: -1800 mL    03 Dec 2021 07:01  -  03 Dec 2021 16:17  --------------------------------------------------------  IN: 0 mL / OUT: 600 mL / NET: -600 mL    PHYSICAL EXAM:    GENERAL:  NAD  SKIN: No rashes or lesions  HEENT: Atraumatic. Normocephalic.   NECK: Supple, No JVD.    PULMONARY: CTA B/L. No wheezing.   CVS: Normal S1, S2. Rate and Rhythm are regular.   ABDOMEN/GI: Soft, Nontender, Nondistended   MSK:  No clubbing or cyanosis   NEUROLOGIC: Moves all extremities   PSYCH: Alert & oriented x 2, more alert today     Consultant(s) Notes Reviewed:  [x ] YES  [ ] NO  Care Discussed with Consultants/Other Providers [ x] YES  [ ] NO    LABS:                        12.1   7.19  )-----------( 234      ( 03 Dec 2021 05:50 )             38.3     139  |  107  |  14  ----------------------------<  93  4.1   |  16<L>  |  0.9    Ca    10.1      03 Dec 2021 05:50  Phos  2.5     12-03  Mg     1.8     12-03    TPro  6.2  /  Alb  3.4<L>  /  TBili  0.3  /  DBili  x   /  AST  16  /  ALT  20  /  AlkPhos  104  12-03    RADIOLOGY & ADDITIONAL TESTS:  Imaging or report Personally Reviewed:  [x] YES  [ ] NO  EKG reviewed: [x] YES  [ ] NO    Medications:  Standing  amLODIPine   Tablet 5 milliGRAM(s) Oral daily  aspirin  chewable 81 milliGRAM(s) Oral daily  atorvastatin 10 milliGRAM(s) Oral at bedtime  chlorhexidine 4% Liquid 1 Application(s) Topical <User Schedule>  cyanocobalamin 500 MICROGram(s) Oral daily  folic acid 1 milliGRAM(s) Oral daily  gabapentin Solution 100 milliGRAM(s) Oral three times a day  heparin   Injectable 5000 Unit(s) SubCutaneous every 12 hours  levETIRAcetam  IVPB 1000 milliGRAM(s) IV Intermittent every 12 hours  metoprolol tartrate 25 milliGRAM(s) Oral two times a day  piperacillin/tazobactam IVPB.. 3.375 Gram(s) IV Intermittent every 8 hours  polyethylene glycol 3350 17 Gram(s) Oral daily  QUEtiapine 25 milliGRAM(s) Oral three times a day  senna 2 Tablet(s) Oral at bedtime  tamsulosin 0.8 milliGRAM(s) Oral at bedtime    PRN Meds  acetaminophen     Tablet .. 650 milliGRAM(s) Oral every 6 hours PRN  traMADol 25 milliGRAM(s) Oral every 6 hours PRN

## 2021-12-04 NOTE — DISCHARGE NOTE PROVIDER - NSDCCPTREATMENT_GEN_ALL_CORE_FT
PRINCIPAL PROCEDURE  Procedure: CT head wo contrast  Findings and Treatment:       SECONDARY PROCEDURE  Procedure: MRI of brain  Findings and Treatment:     Procedure: CTA head w/w/o contrast  Findings and Treatment:     Procedure: CTA neck w/w/o contrast  Findings and Treatment:
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
## 2021-12-04 NOTE — DISCHARGE NOTE PROVIDER - NSDCMRMEDTOKEN_GEN_ALL_CORE_FT
As a means of avoiding spread of COVID-19, this visit is being conducted by video.  Chronic health problem, uncertain of control.  Patient taking vitamin D3 2000 units daily.   Aspirin Low Dose 81 mg oral delayed release tablet: 81 tab(s) orally once a day   Flomax 0.4 mg oral capsule: 2 cap(s) orally once a day (at bedtime)  folic acid 1 mg oral tablet: 1 tab(s) orally once a day  gabapentin 100 mg oral capsule: 1 cap(s) orally every 8 hours  Lipitor 40 mg oral tablet: 1 tab(s) orally once a day  Metoprolol Tartrate 25 mg oral tablet: 1 tab(s) orally once a day (in the morning)  Norvasc 5 mg oral tablet: 1 tab(s) orally once a day  QUEtiapine 100 mg oral tablet: 1 tab(s) orally once (at bedtime)  QUEtiapine 25 mg oral tablet: 1 tab(s) orally 3 times a day, As Needed MDD:75mg  Vitamin B-12 500 mcg oral tablet: 1 tab(s) orally once a day    Aspirin Low Dose 81 mg oral delayed release tablet: 81 tab(s) orally once a day   folic acid 1 mg oral tablet: 1 tab(s) orally once a day  gabapentin 100 mg oral capsule: 1 cap(s) orally every 8 hours  levETIRAcetam 1000 mg oral tablet: 1 tab(s) orally 2 times a day  Lipitor 40 mg oral tablet: 1 tab(s) orally once a day  Metoprolol Tartrate 25 mg oral tablet: 1 tab(s) orally once a day (in the morning)  Norvasc 5 mg oral tablet: 1 tab(s) orally once a day  QUEtiapine 100 mg oral tablet: 1 tab(s) orally once (at bedtime)  QUEtiapine 25 mg oral tablet: 1 tab(s) orally 3 times a day, As Needed MDD:75mg  Vitamin B-12 500 mcg oral tablet: 1 tab(s) orally once a day

## 2021-12-04 NOTE — DISCHARGE NOTE PROVIDER - NSDCCPCAREPLAN_GEN_ALL_CORE_FT
PRINCIPAL DISCHARGE DIAGNOSIS  Diagnosis: Altered mental status  Assessment and Plan of Treatment:       SECONDARY DISCHARGE DIAGNOSES  Diagnosis: Dementia  Assessment and Plan of Treatment:

## 2021-12-04 NOTE — PROGRESS NOTE ADULT - SUBJECTIVE AND OBJECTIVE BOX
DANISHA JIMÉNEZ  76y Female    CHIEF COMPLAINT:    Patient is a 76y old  Female who presents with a chief complaint of CVA (04 Dec 2021 10:59)      INTERVAL HPI/OVERNIGHT EVENTS:    Patient seen and examined. No acute events overnight. No active complaints     ROS: All other systems are negative.    Vital Signs:    T(F): 98.3 (12-04-21 @ 05:32), Max: 98.4 (12-03-21 @ 20:25)  HR: 95 (12-04-21 @ 05:32) (91 - 100)  BP: 147/58 (12-04-21 @ 05:32) (119/56 - 147/58)  RR: 18 (12-04-21 @ 05:32) (16 - 18)  SpO2: 97% (12-03-21 @ 16:53) (97% - 97%)    03 Dec 2021 07:01  -  04 Dec 2021 07:00  --------------------------------------------------------  IN: 0 mL / OUT: 1450 mL / NET: -1450 mL    04 Dec 2021 07:01  -  04 Dec 2021 15:27  --------------------------------------------------------  IN: 0 mL / OUT: 100 mL / NET: -100 mL    PHYSICAL EXAM:    GENERAL:  NAD  SKIN: No rashes or lesions  HEENT: Atraumatic. Normocephalic.   NECK: Supple, No JVD.    PULMONARY: CTA B/L. No wheezing. No rales  CVS: Normal S1, S2. Rate and Rhythm are regular.   ABDOMEN/GI: Soft, Nontender, Nondistended; BS present  MSK:  No clubbing or cyanosis   NEUROLOGIC:  moves all extremities   PSYCH: Alert & oriented x 2    Consultant(s) Notes Reviewed:  [x ] YES  [ ] NO  Care Discussed with Consultants/Other Providers [ x] YES  [ ] NO    LABS:                        11.6   8.61  )-----------( 279      ( 04 Dec 2021 04:30 )             35.8     141  |  106  |  13  ----------------------------<  100<H>  3.7   |  18  |  0.8    Ca    9.8      04 Dec 2021 04:30  Phos  2.8     12-04  Mg     1.9     12-04    TPro  6.2  /  Alb  3.5  /  TBili  0.3  /  DBili  x   /  AST  29  /  ALT  27  /  AlkPhos  113  12-04    RADIOLOGY & ADDITIONAL TESTS:  Imaging or report Personally Reviewed:  [x] YES  [ ] NO  EKG reviewed: [x] YES  [ ] NO    Medications:  Standing  amLODIPine   Tablet 5 milliGRAM(s) Oral daily  aspirin  chewable 81 milliGRAM(s) Oral daily  atorvastatin 10 milliGRAM(s) Oral at bedtime  chlorhexidine 4% Liquid 1 Application(s) Topical <User Schedule>  cyanocobalamin 500 MICROGram(s) Oral daily  folic acid 1 milliGRAM(s) Oral daily  gabapentin Solution 100 milliGRAM(s) Oral three times a day  heparin   Injectable 5000 Unit(s) SubCutaneous every 12 hours  levETIRAcetam  IVPB 1000 milliGRAM(s) IV Intermittent every 12 hours  metoprolol tartrate 25 milliGRAM(s) Oral two times a day  piperacillin/tazobactam IVPB.. 3.375 Gram(s) IV Intermittent every 8 hours  polyethylene glycol 3350 17 Gram(s) Oral daily  QUEtiapine 25 milliGRAM(s) Oral three times a day  senna 2 Tablet(s) Oral at bedtime  tamsulosin 0.8 milliGRAM(s) Oral at bedtime    PRN Meds  acetaminophen     Tablet .. 650 milliGRAM(s) Oral every 6 hours PRN  traMADol 25 milliGRAM(s) Oral every 6 hours PRN

## 2021-12-04 NOTE — PROGRESS NOTE ADULT - PROVIDER SPECIALTY LIST ADULT
Internal Medicine
Physiatry
Internal Medicine
Critical Care
Internal Medicine
Internal Medicine
Critical Care
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
## 2021-12-04 NOTE — DISCHARGE NOTE PROVIDER - CARE PROVIDER_API CALL
Jose David Nguyen)  Neurology  [address] [place] [address]  Phone: [phone]  Fax: [phone]  Established Patient  Follow Up Time:

## 2021-12-10 NOTE — CDI QUERY NOTE - NSCDIOTHERTXTBX_GEN_ALL_CORE_HH
CLINICAL INDICATORS  12/4 Progress Note Adult-Internal Medicine Attending: chief complaint of CVA … Metabolic Encephalopathy secondary to possible CVA v Seizure v infectious process … s/p tPA 11/25 … MRI brain w/wo valentin negative for a stroke. no further inpatient work up planned per neurology    12/4 Discharge Note Provider: Stroke code called on arrival to ED, NIHSS 20. No acute changes on CTH, no LVO or perfusion deficit on CTH/CTA. After arrival to ED pt experienced vomiting and desatted to 80s, subsequently intubated for airway protection and started on Propofol gtt. Started on Nicardipine gtt due to elevated BP. Pt received tPA at 1843 … 11/26: Post-tpa CTH stable … 12/2: MRI brain showed no acute pathology … PRINCIPAL DISCHARGE DIAGNOSIS: Altered mental status    Based on your professional judgment and the clinical indicators, please clarify if the documentation of possible CVA can be further specified as:  • Aborted stroke associated with tPA administration was treated and evaluated  • CVA was ruled out  • Other (please specify):  • Clinically unable to determine    Thank you,  Ana Garza RN [facility]  [phone]

## 2021-12-22 PROBLEM — I10 ESSENTIAL (PRIMARY) HYPERTENSION: Chronic | Status: ACTIVE | Noted: 2021-01-01

## 2021-12-22 PROBLEM — Z86.73 PERSONAL HISTORY OF TRANSIENT ISCHEMIC ATTACK (TIA), AND CEREBRAL INFARCTION WITHOUT RESIDUAL DEFICITS: Chronic | Status: ACTIVE | Noted: 2021-01-01

## 2021-12-22 PROBLEM — F03.90 UNSPECIFIED DEMENTIA WITHOUT BEHAVIORAL DISTURBANCE: Chronic | Status: ACTIVE | Noted: 2021-01-01

## 2021-12-22 PROBLEM — G40.909 EPILEPSY, UNSPECIFIED, NOT INTRACTABLE, WITHOUT STATUS EPILEPTICUS: Chronic | Status: ACTIVE | Noted: 2021-01-01

## 2021-12-22 PROBLEM — E53.8 DEFICIENCY OF OTHER SPECIFIED B GROUP VITAMINS: Chronic | Status: ACTIVE | Noted: 2021-01-01

## 2021-12-22 PROBLEM — K21.9 GASTRO-ESOPHAGEAL REFLUX DISEASE WITHOUT ESOPHAGITIS: Chronic | Status: ACTIVE | Noted: 2021-01-01

## 2021-12-22 NOTE — PHYSICAL THERAPY INITIAL EVALUATION ADULT - LEVEL OF INDEPENDENCE: SIT/STAND, REHAB EVAL
stood on RT LE; pt able to fully stand up on 1st attempt, only 1/2 way on 2nd attempt 2/2 c/o fatigue/moderate assist (50% patients effort)

## 2021-12-22 NOTE — H&P ADULT - NSHPPHYSICALEXAM_GEN_ALL_CORE
VITAL SIGNS: AFebrile, vital signs stable  CONSTITUTIONAL: Well-developed; well-nourished; in no acute distress.  SKIN: Skin exam is warm and dry, no acute rash.  HEAD: Normocephalic; atraumatic.  EYES: Pupils equal round reactive to light, Extraocular movements intact; conjunctiva and sclera clear.  ENT: No nasal discharge; airway clear. Moist mucus membranes.  NECK: Supple; non tender. No rigidity  CARD: Regular rate and rhythm. Normal S1, S2; no murmurs, gallops, or rubs.  RESP: Lungs clear to auscultation bilaterally. No wheezes, rales or rhonchi.  ABD: Abdomen soft; non-tender; non-distended;  no hepatosplenomegaly. No costovertebral angle tenderness.   EXT: Normal ROM. No clubbing, cyanosis or edema. No calf tenderness to palpation.  NEURO:Cognitive/Language:  The patient is oriented to person, place, time and month.anguage with normal repetition, comprehension and naming.  Nondysarthric.    Eyes:  PERRL.  No ptosis/weakness of eyelid closure.    Face:  no facial asymmetry.    Motor examination:   Normal tone, bulk and range of motion.  No tenderness, twitching, tremors or involuntary movements.  Formal Muscle Strength Testing: (MRC grade R/L) 5/5 UE; 5/5 LE.  No observable drift. LLE AKA  PSYCH: Cooperative, appropriate.

## 2021-12-22 NOTE — ED ADULT NURSE NOTE - NSIMPLEMENTINTERV_GEN_ALL_ED
Implemented All Fall with Harm Risk Interventions:  [place] to call system. Call bell, personal items and telephone within reach. Instruct patient to call for assistance. Room bathroom lighting operational. Non-slip footwear when patient is off stretcher. Physically safe environment: no spills, clutter or unnecessary equipment. Stretcher in lowest position, wheels locked, appropriate side rails in place. Provide visual cue, wrist band, yellow gown, etc. Monitor gait and stability. Monitor for mental status changes and reorient to person, place, and time. Review medications for side effects contributing to fall risk. Reinforce activity limits and safety measures with patient and family. Provide visual clues: red socks.
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
## 2021-12-22 NOTE — PHYSICAL THERAPY INITIAL EVALUATION ADULT - GENERAL OBSERVATIONS, REHAB EVAL
[id number] pm. 75 y/o Malay-speaking F rec'd/left on a stretcher, nad, + tele, dtr present and provided translation. per dtr, pt is grossly aware of her surroundings and family members. 126/56 103 97% on RA. c/o post LT residual limb pain.

## 2021-12-22 NOTE — CONSULT NOTE ADULT - ATTENDING COMMENTS
I have personally seen and examined this patient on 12/22. I have fully participated in the care of this patient.  I have reviewed all pertinent clinical information, including history, physical exam, plan and note.  Patient presented with general weakness and lethargic. UA is positive. No focal weakness. Toxic metabolic encephalopathy. Recommend to treat UTI. Routine EEG. Continue Keppra.  I have reviewed all pertinent clinical information and reviewed all relevant imaging and diagnostic studies personally.  Recommendations as above.  Agree with above assessment except as noted.

## 2021-12-22 NOTE — ED ADULT TRIAGE NOTE - CHIEF COMPLAINT QUOTE
BIBA as per daughter pt woke up and was very weak toward the right side to get out of bed, also c/o slurred speech. last well known 9 pm.
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
## 2021-12-22 NOTE — ED ADULT NURSE NOTE - OBJECTIVE STATEMENT
77 y/o female brought in for weakness on left side. Patient woke up at 5am this morning last known well was when went to bed at 9pm last night. Patient NIH=0 at this time.

## 2021-12-22 NOTE — ED PROVIDER NOTE - OBJECTIVE STATEMENT
Pt is a 76 year old female with PMH Alzheimer's dementia, HTN, HLD, CVA with L sided residual deficits Pt is a 76 year old female with PMH Alzheimer's dementia, HTN, HLD, CVA with L sided residual deficits presents to ED with complaints of weakness. Pt is Maldivian speaking only, however daughter translates whom is with pt. As per daughter pt woke up this morning seemed more weak then usual, L>R however daughter states both sides seem weak. Daughter also states pt speech seems impaired as well. NO recent falls or head trauma reported. Denies fever, chills, bodyaches, chest pain, sob, abdominal pain, NVCD

## 2021-12-22 NOTE — ED PROVIDER NOTE - CLINICAL SUMMARY MEDICAL DECISION MAKING FREE TEXT BOX
75 yo F presented to ED for generalized weakness. Pt found to have UTI. Ct head did not demonstrate any acute pathology. Pt admitted for further evaluation and management.

## 2021-12-22 NOTE — ED PROVIDER NOTE - PHYSICAL EXAMINATION
Physical Exam    Vital Signs: I have reviewed the initial vital signs.  Constitutional: well-nourished, appears stated age, no acute distress  Eyes: Conjunctiva pink, Sclera clear, PERRLA, EOMI.  Cardiovascular: S1 and S2, regular rate, regular rhythm, well-perfused extremities, radial pulses equal and 2+  Respiratory: unlabored respiratory effort, clear to auscultation bilaterally no wheezing, rales and rhonchi  Gastrointestinal: soft, non-tender abdomen, no pulsatile mass, normal bowl sounds  Musculoskeletal: supple neck, no lower extremity edema, no midline tenderness  Integumentary: warm, dry, no rash  Neurologic: awake, alert (person and place) cranial nerves II-XII grossly intact, extremities’ motor and sensory functions grossly intact.   Psychiatric: appropriate mood, appropriate affect

## 2021-12-22 NOTE — STROKE CODE NOTE - IV ALTEPLASE EXCLUSION ABS OTHER
Statement Selected
NIHSS 0 and out of window for IV thrombolytics, NIHSS 0 not a candidate for IA intervention.

## 2021-12-22 NOTE — ED ADULT NURSE NOTE - DRUG PRE-SCREENING (DAST -1)
[facility]     Home Care Following Cataract Surgery    If you have a gauze eye patch on, please do not remove it until it is removed by your doctor at your first appointment after your surgery.  You will start your eye drops the next day.    OR    If you only have a clear eye shield on, you may remove the eye shield when you get home and begin eye drops today as directed by your doctor.      Wear the clear eye shield for protection when sleeping for the next 5 days.      Do not rub the eye that had the operation.      Your eye might be sensitive to light.  Wearing sunglasses may be more comfortable for you.      You may have some discomfort and irritation.  Acetaminophen (Tylenol) or Ibuprofen (Advil) may be taken for discomfort. If pain persists please call your doctor s office.      Keep the eye that had the surgery dry. You may wash your hair, bathe or shower, but keep your eye closed while doing so.       Avoid bending over, strenuous activity or heavy lifting until this activity has been approved by your doctor.      You have a follow-up appointment with your doctor tomorrow at the [facility] ([phone]).  Bring all your prescribed eye drops with you to this follow-up appointment.        If you take glaucoma medications, bring them with you to your follow-up appointment tomorrow.      Use medication exactly as prescribed by your doctor. You may restart your regular home medications.       Call your doctor s office at [phone] if any of the following should occur:    - Any sudden vision changes, including decreased vision  - Nausea or severe headache  - Increase in pain that is not controlled with Acetaminophen (Tylenol) or Ibuprofen (Advil)  - Signs of infection (pus, increasing redness or tenderness)  - Severe sensitivity to light  - An increase in floaters (black spots in front of your vision)    
Statement Selected

## 2021-12-22 NOTE — PATIENT PROFILE ADULT - FALL HARM RISK - HARM RISK INTERVENTIONS

## 2021-12-22 NOTE — ED PROVIDER NOTE - NS ED ROS FT
Constitutional: (-) fever  Eyes/ENT: (-) blurry vision, (-) epistaxis  Cardiovascular: (-) chest pain, (-) syncope  Respiratory: (-) cough, (-) shortness of breath  Gastrointestinal: (-) vomiting, (-) diarrhea  Musculoskeletal: (-) neck pain, (-) back pain, (-) joint pain  Integumentary: (-) rash, (-) edema  Neurological: (-) headache, (+) altered mental status (+) weakness   Psychiatric: (-) hallucinations  Allergic/Immunologic: (-) pruritus

## 2021-12-22 NOTE — H&P ADULT - NSHPREVIEWOFSYSTEMS_GEN_ALL_CORE
CONSTITUTIONAL: No fever, weight loss, or fatigue  EYES: No eye pain, visual disturbances, or discharge  RESPIRATORY: No cough, wheezing, chills or hemoptysis; No shortness of breath  CARDIOVASCULAR: No chest pain, palpitations, dizziness, or leg swelling  GASTROINTESTINAL: No abdominal or epigastric pain. No nausea, vomiting, or hematemesis; No diarrhea or constipation. No   GENITOURINARY: No dysuria, frequency, hematuria, or incontinence  NEUROLOGICAL: No headaches, memory loss, loss of strength, numbness, or tremors  SKIN: No itching, burning, rashes, or lesions   LYMPH NODES: No enlarged glands  ENDOCRINE: No heat or cold intolerance; No hair loss  MUSCULOSKELETAL: No joint pain or swelling; No muscle, back, or extremity pain

## 2021-12-22 NOTE — CONSULT NOTE ADULT - SUBJECTIVE AND OBJECTIVE BOX
Neurology Consult    Patient is a 76y old  Female who presents with a chief complaint of     HPI:   Pt is a 76 year old female with PMH Alzheimer's dementia, HTN, HLD, CVA with L sided residual deficits presents to ED with complaints of weakness. Patients daughter found her to have generalized weakness upon waking in am. Stroke code on arrival.      PAST MEDICAL & SURGICAL HISTORY:  Hypertension    Dementia    Chronic GERD    History of TIA (transient ischemic attack)    Seizure disorder    Folate deficiency    Vitamin B12 deficiency    S/P AKA (above knee amputation), left    H/O left nephrectomy        FAMILY HISTORY:      Social History: (-) x 3    Allergies    adhesives (Unknown)  No Known Drug Allergies    Intolerances        MEDICATIONS  (STANDING):    MEDICATIONS  (PRN):    Vital Signs Last 24 Hrs  T(C): 37.7 (22 Dec 2021 06:49), Max: 37.7 (22 Dec 2021 06:49)  T(F): 99.9 (22 Dec 2021 06:49), Max: 99.9 (22 Dec 2021 06:49)  HR: 116 (22 Dec 2021 06:49) (116 - 116)  BP: 107/58 (22 Dec 2021 06:49) (107/58 - 107/58)  BP(mean): --  RR: 18 (22 Dec 2021 06:49) (18 - 18)  SpO2: 99% (22 Dec 2021 06:49) (99% - 99%)    Examination:  General:  Appearance is consistent with chronologic age.  No abnormal facies.  Gross skin survey within normal limits.    Cognitive/Language:  The patient is oriented to person, place, time and month.anguage with normal repetition, comprehension and naming.  Nondysarthric.    Eyes: intact VA, VFF.  EOMI w/o nystagmus, skew or reported double vision.  PERRL.  No ptosis/weakness of eyelid closure.    Face:  Facial sensation normal V1 - 3, no facial asymmetry.    Motor examination:   Normal tone, bulk and range of motion.  No tenderness, twitching, tremors or involuntary movements.  Formal Muscle Strength Testing: (MRC grade R/L) 5/5 UE; 5/5 LE.  No observable drift. LLE AKA  Sensory examination:   Intact to light touch and pinprick, pain, temperature and proprioception and vibration in all extremities.  Cerebellum: No FTN dysmetria  Gait deferred    NIHSS 0    Labs:                     Neuroimaging:  NCHCT:   < from: CT Brain Stroke Protocol (12.22.21 @ 07:22) >    IMPRESSION:      No acute intracranial hemorrhage, mass effect or midline shift. No   significant change since the prior exam.    Extensive chronic microvascular white matter changes.    < end of copied text >    12-22-21 @ 07:52

## 2021-12-22 NOTE — CONSULT NOTE ADULT - ASSESSMENT
Impression;   Pt is a 76 year old female with PMH Alzheimer's dementia, HTN, HLD, CVA with L sided residual deficits presents to ED with complaints of weakness. Patients daughter found her to have generalized weakness upon waking in am. Stroke code on arrival. NIHSS 0 on exam also out of the window for IV thormbolytics. Patient with NIHSS 0 and m-RS >2 not a candidate for IA intervention. Patient with recent history of seizure and admission as per daughter.     Suggestion:  Routine EEG  Seizure precautions  Keep magnesium >2  Medical workup for infectious process. (Daughter reports dark foul smelling urine)  TSH, b12, folate levels.   Can continue patients keppra.

## 2021-12-22 NOTE — ED PROVIDER NOTE - ATTENDING CONTRIBUTION TO CARE
77 yo F with PMH of dementia, HTN, HLD, CVA with L sided weakness presents to ED for weakness. Pts daughter saw pt this morning and found her to be more slumped over and weak. She also felt that patient's speech seemed more slurred. No nausea, vomiting, CP, palpitations. Pt does not have any concerns at this time.     Const: Well nourished, well developed, appears stated age  Eyes: PERRL, no conjunctival injection  HENT:  Neck supple without meningismus   CV: RRR, Warm, well-perfused extremities  RESP: CTA B/L, no tachypnea   GI: soft, non-tender, non-distended  MSK: No gross deformities appreciated  Skin: Warm, dry. No rashes  Neuro: Alert, CNs II-XII grossly intact. R/L 5/5 UE; 5/5 LE.  LLE AKA  Psych: Appropriate mood and affect.    stroke code was called in triage.     concern for infectious etiology. will do labs, UA

## 2021-12-22 NOTE — ED ADULT TRIAGE NOTE - MEANS OF ARRIVAL
Frances daughter called asking for orders for UTI, she states that patient is having accidents in the chair she sits/sleeps in.  Reports discomfort, nausea.     Patient lives at a [facility], daughter is requesting we call jaylen at the assisted living to set up appt. Telephone/video.  Patient's other daughter would transport the urine for testing.  Previous telephone note reports jaylen phone # as [phone]      [facility]        stretcher

## 2021-12-22 NOTE — H&P ADULT - ATTENDING COMMENTS
**HX and physical limited due to AMS. Supplemental information obtained from family, house staff, and EMR    77 YO F with a PMH of dementia, HTN, HLD, CVA x2 (residual R sided facial droop), and seizure disorder who was BIBEMS for eval of AMS for the past x 2 days. Associated with generalized weakness over the past x 3-4 days. Associated with diarrhea. Unable to obtain ROS.     In the ED, UA is positive, cultures sent. Pt started on IV ABXs (Ceftriaxone).     FMHx: Reviewed, not relevant    Physical exam shows pt in NAD. VSS, afebrile, not hypoxic on RA. A&Ox0. Neuro exam without deficits, motor/sensory intact, no dysarthria, no facial asymmetry. Muscle strength/sensation intact. CTA B/L with no W/C/R. RRR, no M/G/R. ABD is soft and non-tender, normoactive BSs. LEs without swelling. No rashes. Labs and radiology as above.     Metabolic encephalopathy due to urinary tract infection; no sepsis present on admission. FU cultures. IVFs (LR). PRN pain meds. IV ABXs (Ceftriaxone).     C. diff infection, non-fulminant. PO Vanco. APAP PRN.     NAGMA (Non-AG Metabolic Acidosis) from GI losses of bicarb. IVFs (LR). Obtain VBG. Repeat BMP in the AM    Hx of dementia, HTN, HLD, CVA x2 (residual R sided facial droop), and seizure disorder. Restart home meds, except as stated above. DVT PPX. Inform PCP of pt's admission to hospital. My note supersedes the residents note. **HX and physical limited due to AMS. Supplemental information obtained from family, house staff, and EMR    75 YO F with a PMH of dementia, HTN, HLD, CVA x2 (residual R sided facial droop), and seizure disorder who was BIBEMS for eval of AMS for the past x 2 days. Associated with generalized weakness over the past x 3-4 days. Associated with diarrhea. Unable to obtain ROS.     In the ED, UA is positive, cultures sent. Pt started on IV ABXs (Ceftriaxone).     FMHx: Reviewed, not relevant    Physical exam shows pt in NAD. VSS, afebrile, not hypoxic on RA. A&Ox0. Neuro exam without deficits, motor/sensory intact, no dysarthria, no facial asymmetry. Muscle strength/sensation intact. CTA B/L with no W/C/R. RRR, no M/G/R. ABD is soft and non-tender, normoactive BSs. LEs without swelling. No rashes. Labs and radiology as above.     Metabolic encephalopathy due to urinary tract infection; no sepsis present on admission. FU cultures. IVFs (LR). PRN pain meds. IV ABXs (Ceftriaxone).     C. diff infection, non-fulminant. Isolate. PO Vanco. APAP PRN.     NAGMA (Non-AG Metabolic Acidosis) from GI losses of bicarb. IVFs (LR). Obtain VBG. Repeat BMP in the AM    Hx of dementia, HTN, HLD, CVA x2 (residual R sided facial droop), and seizure disorder. Restart home meds, except as stated above. DVT PPX. Inform PCP of pt's admission to hospital. My note supersedes the residents note.

## 2021-12-22 NOTE — PHYSICAL THERAPY INITIAL EVALUATION ADULT - PRECAUTIONS/LIMITATIONS, REHAB EVAL
Alzheimers dementia, LT AKA since pt was 19 y/o (has a prosthetic leg)/fall precautions/spinal precautions

## 2021-12-23 NOTE — OCCUPATIONAL THERAPY INITIAL EVALUATION ADULT - ADDITIONAL COMMENTS
As per daughter, pt resides on 1 level in first floor apartment with 3 inside steps to enter apartment.

## 2021-12-23 NOTE — OCCUPATIONAL THERAPY INITIAL EVALUATION ADULT - PERTINENT HX OF CURRENT PROBLEM, REHAB EVAL
77 YO F with a PMH of dementia, HTN, HLD, CVA x2 (residual R sided facial droop), and seizure disorder who was BIBEMS for eval of AMS for the past x 2 days. Associated with generalized weakness over the past x 3-4 days. Associated with diarrhea. Unable to obtain ROS.
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
## 2021-12-23 NOTE — ED ADULT NURSE REASSESSMENT NOTE - NS ED NURSE REASSESS COMMENT FT1
Patient is having confusion. Patient's daughter at the bedside. Patient keeps trying to crawl out of bed and is confused. patient thinks she is in her own house. Notified MD. Awaiting Orders. Will continue to monitor patient for acute changes in vital signs.
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
## 2021-12-23 NOTE — PROGRESS NOTE ADULT - SUBJECTIVE AND OBJECTIVE BOX
HPI  Patient is a 76y old Female who presents with a chief complaint of generalized weakness, (22 Dec 2021 10:58)    Currently admitted to medicine with the primary diagnosis of Acute UTI       Today is hospital day 1d.     INTERVAL HPI / OVERNIGHT EVENTS:  Patient was examined and seen at bedside. This morning she is resting comfortably in bed. She is awake, but confused     ROS: Otherwise unremarkable     PAST MEDICAL & SURGICAL HISTORY  Hypertension    Dementia    Chronic GERD    History of TIA (transient ischemic attack)    Seizure disorder    Folate deficiency    Vitamin B12 deficiency    S/P AKA (above knee amputation), left    H/O left nephrectomy      ALLERGIES  adhesives (Unknown)  No Known Drug Allergies    MEDICATIONS  STANDING MEDICATIONS  aspirin enteric coated 81 milliGRAM(s) Oral daily  atorvastatin 40 milliGRAM(s) Oral at bedtime  cefTRIAXone   IVPB 1000 milliGRAM(s) IV Intermittent every 24 hours  folic acid 1 milliGRAM(s) Oral daily  gabapentin 100 milliGRAM(s) Oral every 8 hours  heparin   Injectable 5000 Unit(s) SubCutaneous every 12 hours  levETIRAcetam  IVPB 1000 milliGRAM(s) IV Intermittent <User Schedule>  metoprolol tartrate 25 milliGRAM(s) Oral two times a day  QUEtiapine 100 milliGRAM(s) Oral at bedtime  vancomycin    Solution 125 milliGRAM(s) Oral every 6 hours    PRN MEDICATIONS    VITALS:  T(F): 98.6  HR: 74  BP: 110/57  RR: 20  SpO2: 98%    PHYSICAL EXAM  GEN: NAD, Resting comfortably in bed, frail elderly female  PULM: Clear to auscultation bilaterally, No wheezes  CVS: Regular rate and rhythm, S1-S2, no murmurs  ABD: Soft, non-tender, non-distended, no guarding  EXT: No edema  NEURO: Alert, oriented x1    LABS                        13.8   6.50  )-----------( 169      ( 22 Dec 2021 08:00 )             43.1         140  |  110  |  15  ----------------------------<  92  4.2   |  19  |  0.8    Ca    9.7      23 Dec 2021 04:30  Mg     1.9         TPro  5.8<L>  /  Alb  3.2<L>  /  TBili  0.2  /  DBili  x   /  AST  14  /  ALT  6   /  AlkPhos  119<H>  12    PT/INR - ( 22 Dec 2021 08:00 )   PT: 12.10 sec;   INR: 1.05 ratio         PTT - ( 22 Dec 2021 08:00 )  PTT:36.4 sec  Urinalysis Basic - ( 22 Dec 2021 07:59 )    Color: Yellow / Appearance: Clear / S.012 / pH: x  Gluc: x / Ketone: Negative  / Bili: Negative / Urobili: <2 mg/dL   Blood: x / Protein: Trace / Nitrite: Positive   Leuk Esterase: Large / RBC: 1 /HPF / WBC 23 /HPF   Sq Epi: x / Non Sq Epi: 0 /HPF / Bacteria: Many            CARDIAC MARKERS ( 22 Dec 2021 08:00 )  x     / <0.01 ng/mL / x     / x     / x          RADIOLOGY    ACC: [id number] EXAM:  CT BRAIN STROKE PROTOCOL                          PROCEDURE DATE:  2021          INTERPRETATION:  Clinical History / Reason for exam: Stroke code    Technique: Contiguous axial CT images were obtained from the base of the  skull to the vertex without administration of intravenous contrast.   Coronal and sagittal reformatted images were reconstructed.    Comparison: 2021    Findings:    The ventricles and cortical sulci are enlarged and remain unchanged.    No intracranial hemorrhage is identified. There are no extra-axial fluid   collections, space occupying processes, mass-effect, or midline shift.    There are confluent areas of hypodensity throughout the hemispheric white   matter without mass effect compatible with chronic microvascular changes.    Gray white matter differentiation is preserved.    Evaluation of the calvarium demonstrates no suspicious bony   abnormalities. The visualized paranasal sinuses and mastoids are well   aerated.      IMPRESSION:      No acute intracranial hemorrhage, mass effect or midline shift. No   significant change since the prior exam.    Extensive chronic microvascular white matter changes.   HPI  Patient is a 76y old Female who presents with a chief complaint of generalized weakness, (22 Dec 2021 10:58)    Currently admitted to medicine with the primary diagnosis of Acute UTI       Today is hospital day 1d.     INTERVAL HPI / OVERNIGHT EVENTS:  Patient was examined and seen at bedside. This morning she is resting comfortably in bed. Daughter is at bedside. Patient denies any abdominal pain.     ROS: Otherwise unremarkable     PAST MEDICAL & SURGICAL HISTORY  Hypertension    Dementia    Chronic GERD    History of TIA (transient ischemic attack)    Seizure disorder    Folate deficiency    Vitamin B12 deficiency    S/P AKA (above knee amputation), left    H/O left nephrectomy      ALLERGIES  adhesives (Unknown)  No Known Drug Allergies    MEDICATIONS  STANDING MEDICATIONS  aspirin enteric coated 81 milliGRAM(s) Oral daily  atorvastatin 40 milliGRAM(s) Oral at bedtime  cefTRIAXone   IVPB 1000 milliGRAM(s) IV Intermittent every 24 hours  folic acid 1 milliGRAM(s) Oral daily  gabapentin 100 milliGRAM(s) Oral every 8 hours  heparin   Injectable 5000 Unit(s) SubCutaneous every 12 hours  levETIRAcetam  IVPB 1000 milliGRAM(s) IV Intermittent <User Schedule>  metoprolol tartrate 25 milliGRAM(s) Oral two times a day  QUEtiapine 100 milliGRAM(s) Oral at bedtime  vancomycin    Solution 125 milliGRAM(s) Oral every 6 hours    PRN MEDICATIONS    VITALS:  T(F): 98.6  HR: 74  BP: 110/57  RR: 20  SpO2: 98%    PHYSICAL EXAM  GEN: NAD, Resting comfortably in bed, frail elderly female  PULM: Clear to auscultation bilaterally, No wheezes  CVS: Regular rate and rhythm, S1-S2, no murmurs  ABD: Soft, non-tender, non-distended, no guarding  EXT: No edema  NEURO: Alert, oriented x2 (doesn't know date), follows commands, mild weakness in right UE     LABS                        13.8   6.50  )-----------( 169      ( 22 Dec 2021 08:00 )             43.1     12    140  |  110  |  15  ----------------------------<  92  4.2   |  19  |  0.8    Ca    9.7      23 Dec 2021 04:30  Mg     1.9         TPro  5.8<L>  /  Alb  3.2<L>  /  TBili  0.2  /  DBili  x   /  AST  14  /  ALT  6   /  AlkPhos  119<H>  12-23    PT/INR - ( 22 Dec 2021 08:00 )   PT: 12.10 sec;   INR: 1.05 ratio         PTT - ( 22 Dec 2021 08:00 )  PTT:36.4 sec  Urinalysis Basic - ( 22 Dec 2021 07:59 )    Color: Yellow / Appearance: Clear / S.012 / pH: x  Gluc: x / Ketone: Negative  / Bili: Negative / Urobili: <2 mg/dL   Blood: x / Protein: Trace / Nitrite: Positive   Leuk Esterase: Large / RBC: 1 /HPF / WBC 23 /HPF   Sq Epi: x / Non Sq Epi: 0 /HPF / Bacteria: Many            CARDIAC MARKERS ( 22 Dec 2021 08:00 )  x     / <0.01 ng/mL / x     / x     / x          RADIOLOGY    ACC: [id number] EXAM:  CT BRAIN STROKE PROTOCOL                          PROCEDURE DATE:  2021          INTERPRETATION:  Clinical History / Reason for exam: Stroke code    Technique: Contiguous axial CT images were obtained from the base of the  skull to the vertex without administration of intravenous contrast.   Coronal and sagittal reformatted images were reconstructed.    Comparison: 2021    Findings:    The ventricles and cortical sulci are enlarged and remain unchanged.    No intracranial hemorrhage is identified. There are no extra-axial fluid   collections, space occupying processes, mass-effect, or midline shift.    There are confluent areas of hypodensity throughout the hemispheric white   matter without mass effect compatible with chronic microvascular changes.    Gray white matter differentiation is preserved.    Evaluation of the calvarium demonstrates no suspicious bony   abnormalities. The visualized paranasal sinuses and mastoids are well   aerated.      IMPRESSION:      No acute intracranial hemorrhage, mass effect or midline shift. No   significant change since the prior exam.    Extensive chronic microvascular white matter changes.

## 2021-12-23 NOTE — OCCUPATIONAL THERAPY INITIAL EVALUATION ADULT - ADAPTIVE EQUIPMENT NEEDED
shower chair, 2 wall grab bars, hand-held  head. Uses commode bedside only for night and first morning urine./yes

## 2021-12-23 NOTE — OCCUPATIONAL THERAPY INITIAL EVALUATION ADULT - GENERAL OBSERVATIONS, REHAB EVAL
Pt received and left semifowler in bed. + IV lock, + cardiac monitor. Daughter present. Declined use of  services.

## 2021-12-23 NOTE — ED ADULT NURSE REASSESSMENT NOTE - NS ED NURSE REASSESS COMMENT FT1
Daughter states" The day shift nurse told me to give her a dose since I have the medication." Dose was given during day shift. Will continue to monitor.
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
## 2021-12-24 NOTE — PROGRESS NOTE ADULT - SUBJECTIVE AND OBJECTIVE BOX
HPI  Patient is a 76y old Female who presents with a chief complaint of generalized weakness, (23 Dec 2021 10:15)    Currently admitted to medicine with the primary diagnosis of Acute UTI       Today is hospital day 2d.     INTERVAL HPI / OVERNIGHT EVENTS:  Patient was examined and seen at bedside. This morning she is resting comfortably in bed and reports no new issues or overnight events.     ROS: Otherwise unremarkable     PAST MEDICAL & SURGICAL HISTORY  Hypertension    Dementia    Chronic GERD    History of TIA (transient ischemic attack)    Seizure disorder    Folate deficiency    Vitamin B12 deficiency    S/P AKA (above knee amputation), left    H/O left nephrectomy      ALLERGIES  adhesives (Unknown)  No Known Drug Allergies    MEDICATIONS  STANDING MEDICATIONS  aspirin enteric coated 81 milliGRAM(s) Oral daily  atorvastatin 40 milliGRAM(s) Oral at bedtime  cefTRIAXone   IVPB 1000 milliGRAM(s) IV Intermittent every 24 hours  diVALproex  milliGRAM(s) Oral two times a day  folic acid 1 milliGRAM(s) Oral daily  gabapentin 100 milliGRAM(s) Oral every 8 hours  heparin   Injectable 5000 Unit(s) SubCutaneous every 12 hours  metoprolol tartrate 25 milliGRAM(s) Oral two times a day  QUEtiapine 100 milliGRAM(s) Oral at bedtime  vancomycin    Solution 125 milliGRAM(s) Oral every 6 hours    PRN MEDICATIONS    VITALS:  T(F): 98  HR: 87  BP: 124/60  RR: 18  SpO2: 97%    PHYSICAL EXAM  GEN: NAD, Resting comfortably in bed  PULM: Clear to auscultation bilaterally, No wheezes  CVS: Regular rate and rhythm, S1-S2, no murmurs  ABD: Soft, non-tender, non-distended, no guarding  EXT: No edema  NEURO: A&Ox3, no focal deficits    LABS                        11.9   5.64  )-----------( 129      ( 23 Dec 2021 04:30 )             37.4     12-    140  |  110  |  15  ----------------------------<  92  4.2   |  19  |  0.8    Ca    9.7      23 Dec 2021 04:30  Mg     1.9         TPro  5.8<L>  /  Alb  3.2<L>  /  TBili  0.2  /  DBili  x   /  AST  14  /  ALT  6   /  AlkPhos  119<H>      PT/INR - ( 22 Dec 2021 08:00 )   PT: 12.10 sec;   INR: 1.05 ratio         PTT - ( 22 Dec 2021 08:00 )  PTT:36.4 sec  Urinalysis Basic - ( 22 Dec 2021 07:59 )    Color: Yellow / Appearance: Clear / S.012 / pH: x  Gluc: x / Ketone: Negative  / Bili: Negative / Urobili: <2 mg/dL   Blood: x / Protein: Trace / Nitrite: Positive   Leuk Esterase: Large / RBC: 1 /HPF / WBC 23 /HPF   Sq Epi: x / Non Sq Epi: 0 /HPF / Bacteria: Many            Culture - Urine (collected 22 Dec 2021 19:50)  Source: Clean Catch Clean Catch (Midstream)  Final Report (24 Dec 2021 05:19):    <10,000 CFU/mL Normal Urogenital Marylin      CARDIAC MARKERS ( 22 Dec 2021 08:00 )  x     / <0.01 ng/mL / x     / x     / x          RADIOLOGY     HPI  Patient is a 76y old Female who presents with a chief complaint of generalized weakness, (23 Dec 2021 10:15)    Currently admitted to medicine with the primary diagnosis of Acute UTI       Today is hospital day 2d.     INTERVAL HPI / OVERNIGHT EVENTS:  Patient was examined and seen at bedside. This morning she is resting comfortably in bed. She is sleepy this morning, she had gotten haldol overnight for agitation     ROS: Otherwise unremarkable     PAST MEDICAL & SURGICAL HISTORY  Hypertension    Dementia    Chronic GERD    History of TIA (transient ischemic attack)    Seizure disorder    Folate deficiency    Vitamin B12 deficiency    S/P AKA (above knee amputation), left    H/O left nephrectomy      ALLERGIES  adhesives (Unknown)  No Known Drug Allergies    MEDICATIONS  STANDING MEDICATIONS  aspirin enteric coated 81 milliGRAM(s) Oral daily  atorvastatin 40 milliGRAM(s) Oral at bedtime  cefTRIAXone   IVPB 1000 milliGRAM(s) IV Intermittent every 24 hours  diVALproex  milliGRAM(s) Oral two times a day  folic acid 1 milliGRAM(s) Oral daily  gabapentin 100 milliGRAM(s) Oral every 8 hours  heparin   Injectable 5000 Unit(s) SubCutaneous every 12 hours  metoprolol tartrate 25 milliGRAM(s) Oral two times a day  QUEtiapine 100 milliGRAM(s) Oral at bedtime  vancomycin    Solution 125 milliGRAM(s) Oral every 6 hours    PRN MEDICATIONS    VITALS:  T(F): 98  HR: 87  BP: 124/60  RR: 18  SpO2: 97%    PHYSICAL EXAM  GEN: NAD, Resting comfortably in bed, frail elderly female   PULM: Clear to auscultation bilaterally, No wheezes  CVS: Regular rate and rhythm, S1-S2, no murmurs  ABD: Soft, non-tender, non-distended, no guarding  EXT: No edema  NEURO: Lethargic, oriented x2    LABS                        11.9   5.64  )-----------( 129      ( 23 Dec 2021 04:30 )             37.4     12-    140  |  110  |  15  ----------------------------<  92  4.2   |  19  |  0.8    Ca    9.7      23 Dec 2021 04:30  Mg     1.9     12-    TPro  5.8<L>  /  Alb  3.2<L>  /  TBili  0.2  /  DBili  x   /  AST  14  /  ALT  6   /  AlkPhos  119<H>  12-23    PT/INR - ( 22 Dec 2021 08:00 )   PT: 12.10 sec;   INR: 1.05 ratio         PTT - ( 22 Dec 2021 08:00 )  PTT:36.4 sec  Urinalysis Basic - ( 22 Dec 2021 07:59 )    Color: Yellow / Appearance: Clear / S.012 / pH: x  Gluc: x / Ketone: Negative  / Bili: Negative / Urobili: <2 mg/dL   Blood: x / Protein: Trace / Nitrite: Positive   Leuk Esterase: Large / RBC: 1 /HPF / WBC 23 /HPF   Sq Epi: x / Non Sq Epi: 0 /HPF / Bacteria: Many            Culture - Urine (collected 22 Dec 2021 19:50)  Source: Clean Catch Clean Catch (Midstream)  Final Report (24 Dec 2021 05:19):    <10,000 CFU/mL Normal Urogenital Marylin      CARDIAC MARKERS ( 22 Dec 2021 08:00 )  x     / <0.01 ng/mL / x     / x     / x          RADIOLOGY    ACC: [id number] EXAM:  CT BRAIN STROKE PROTOCOL                          PROCEDURE DATE:  2021          INTERPRETATION:  Clinical History / Reason for exam: Stroke code    Technique: Contiguous axial CT images were obtained from the base of the  skull to the vertex without administration of intravenous contrast.   Coronal and sagittal reformatted images were reconstructed.    Comparison: 2021    Findings:    The ventricles and cortical sulci are enlarged and remain unchanged.    No intracranial hemorrhage is identified. There are no extra-axial fluid   collections, space occupying processes, mass-effect, or midline shift.    There are confluent areas of hypodensity throughout the hemispheric white   matter without mass effect compatible with chronic microvascular changes.    Gray white matter differentiation is preserved.    Evaluation of the calvarium demonstrates no suspicious bony   abnormalities. The visualized paranasal sinuses and mastoids are well   aerated.      IMPRESSION:      No acute intracranial hemorrhage, mass effect or midline shift. No   significant change since the prior exam.    Extensive chronic microvascular white matter changes.   HPI  Patient is a 76y old Female who presents with a chief complaint of generalized weakness, (23 Dec 2021 10:15)    Currently admitted to medicine with the primary diagnosis of Acute UTI       Today is hospital day 2d.     INTERVAL HPI / OVERNIGHT EVENTS:  Patient was examined and seen at bedside. This morning she is resting comfortably in bed. She is sleepy this morning, she had gotten haldol overnight for agitation     Attending note: Pt seen and examined at bedside. No cp or sob. Pt was agitated overnight and was given haldol.     ROS: Otherwise unremarkable     PAST MEDICAL & SURGICAL HISTORY  Hypertension    Dementia    Chronic GERD    History of TIA (transient ischemic attack)    Seizure disorder    Folate deficiency    Vitamin B12 deficiency    S/P AKA (above knee amputation), left    H/O left nephrectomy      ALLERGIES  adhesives (Unknown)  No Known Drug Allergies    MEDICATIONS  STANDING MEDICATIONS  aspirin enteric coated 81 milliGRAM(s) Oral daily  atorvastatin 40 milliGRAM(s) Oral at bedtime  cefTRIAXone   IVPB 1000 milliGRAM(s) IV Intermittent every 24 hours  diVALproex  milliGRAM(s) Oral two times a day  folic acid 1 milliGRAM(s) Oral daily  gabapentin 100 milliGRAM(s) Oral every 8 hours  heparin   Injectable 5000 Unit(s) SubCutaneous every 12 hours  metoprolol tartrate 25 milliGRAM(s) Oral two times a day  QUEtiapine 100 milliGRAM(s) Oral at bedtime  vancomycin    Solution 125 milliGRAM(s) Oral every 6 hours    PRN MEDICATIONS    VITALS:  T(F): 98  HR: 87  BP: 124/60  RR: 18  SpO2: 97%    PHYSICAL EXAM  GEN: NAD, Resting comfortably in bed, frail elderly female   PULM: Clear to auscultation bilaterally, No wheezes  CVS: Regular rate and rhythm, S1-S2, no murmurs  ABD: Soft, non-tender, non-distended, no guarding  EXT: No edema  NEURO: Lethargic, oriented x2    LABS                        11.9   5.64  )-----------( 129      ( 23 Dec 2021 04:30 )             37.4     12-    140  |  110  |  15  ----------------------------<  92  4.2   |  19  |  0.8    Ca    9.7      23 Dec 2021 04:30  Mg     1.9     12-23    TPro  5.8<L>  /  Alb  3.2<L>  /  TBili  0.2  /  DBili  x   /  AST  14  /  ALT  6   /  AlkPhos  119<H>  12-23    PT/INR - ( 22 Dec 2021 08:00 )   PT: 12.10 sec;   INR: 1.05 ratio         PTT - ( 22 Dec 2021 08:00 )  PTT:36.4 sec  Urinalysis Basic - ( 22 Dec 2021 07:59 )    Color: Yellow / Appearance: Clear / S.012 / pH: x  Gluc: x / Ketone: Negative  / Bili: Negative / Urobili: <2 mg/dL   Blood: x / Protein: Trace / Nitrite: Positive   Leuk Esterase: Large / RBC: 1 /HPF / WBC 23 /HPF   Sq Epi: x / Non Sq Epi: 0 /HPF / Bacteria: Many            Culture - Urine (collected 22 Dec 2021 19:50)  Source: Clean Catch Clean Catch (Midstream)  Final Report (24 Dec 2021 05:19):    <10,000 CFU/mL Normal Urogenital Marylin      CARDIAC MARKERS ( 22 Dec 2021 08:00 )  x     / <0.01 ng/mL / x     / x     / x          RADIOLOGY    ACC: [id number] EXAM:  CT BRAIN STROKE PROTOCOL                          PROCEDURE DATE:  2021          INTERPRETATION:  Clinical History / Reason for exam: Stroke code    Technique: Contiguous axial CT images were obtained from the base of the  skull to the vertex without administration of intravenous contrast.   Coronal and sagittal reformatted images were reconstructed.    Comparison: 2021    Findings:    The ventricles and cortical sulci are enlarged and remain unchanged.    No intracranial hemorrhage is identified. There are no extra-axial fluid   collections, space occupying processes, mass-effect, or midline shift.    There are confluent areas of hypodensity throughout the hemispheric white   matter without mass effect compatible with chronic microvascular changes.    Gray white matter differentiation is preserved.    Evaluation of the calvarium demonstrates no suspicious bony   abnormalities. The visualized paranasal sinuses and mastoids are well   aerated.      IMPRESSION:      No acute intracranial hemorrhage, mass effect or midline shift. No   significant change since the prior exam.    Extensive chronic microvascular white matter changes.

## 2021-12-24 NOTE — DISCHARGE NOTE PROVIDER - NSDCCPCAREPLAN_GEN_ALL_CORE_FT
PRINCIPAL DISCHARGE DIAGNOSIS  Diagnosis: Acute UTI  Assessment and Plan of Treatment: You were admitted for acute mental stasis changes likely due to urine infection and infection in the intestine. You were started on antibiotics which you will continue at home to complete a course. Please follow up with your primary care doctor as outpatient.       PRINCIPAL DISCHARGE DIAGNOSIS  Diagnosis: C. difficile colitis  Assessment and Plan of Treatment:

## 2021-12-24 NOTE — DISCHARGE NOTE PROVIDER - CARE PROVIDER_API CALL
Satish Wilson  Internal Medicine  N  DEMETRICE CLARK, Phys,    Phone: [phone]  Fax: [phone]  Follow Up Time: 2 weeks
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
## 2021-12-24 NOTE — PROGRESS NOTE ADULT - TIME BILLING
#Progress Note Handoff  Pending (specify):  follow up mental status, Urine culture, and diarrhea  Family discussion: house staff updated pt family  Disposition: Pt family would like to take home when medically stable, if no diarrhea and cultures back by 12/25 likely can be DCed, pt anticpated

## 2021-12-24 NOTE — DISCHARGE NOTE PROVIDER - NSDCMRMEDTOKEN_GEN_ALL_CORE_FT
Aspirin Low Dose 81 mg oral delayed release tablet: 81 tab(s) orally once a day   Depakote 250 mg oral delayed release tablet: 1 tab(s) orally 2 times a day  folic acid 1 mg oral tablet: 1 tab(s) orally once a day  gabapentin 100 mg oral capsule: 1 cap(s) orally every 8 hours  Lipitor 40 mg oral tablet: 1 tab(s) orally once a day  Metoprolol Tartrate 25 mg oral tablet: 1 tab(s) orally 2 times a day  Norvasc 5 mg oral tablet: 1 tab(s) orally once a day  QUEtiapine 100 mg oral tablet: 1 tab(s) orally once (at bedtime)  QUEtiapine 25 mg oral tablet: 1 tab(s) orally 3 times a day, As Needed MDD:75mg  Vitamin B-12 500 mcg oral tablet: 1 tab(s) orally once a day    Aspirin Low Dose 81 mg oral delayed release tablet: 81 tab(s) orally once a day   Depakote 250 mg oral delayed release tablet: 1 tab(s) orally 2 times a day  folic acid 1 mg oral tablet: 1 tab(s) orally once a day  gabapentin 100 mg oral capsule: 1 cap(s) orally every 8 hours  Lipitor 40 mg oral tablet: 1 tab(s) orally once a day  Metoprolol Tartrate 25 mg oral tablet: 1 tab(s) orally 2 times a day  Norvasc 5 mg oral tablet: 1 tab(s) orally once a day  QUEtiapine 100 mg oral tablet: 1 tab(s) orally once (at bedtime)  QUEtiapine 25 mg oral tablet: 1 tab(s) orally 3 times a day, As Needed MDD:75mg  vancomycin 50 mg/mL oral liquid: 2.5 milliliter(s) orally 4 times a day   Vitamin B-12 500 mcg oral tablet: 1 tab(s) orally once a day
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
## 2021-12-25 NOTE — PROGRESS NOTE ADULT - SUBJECTIVE AND OBJECTIVE BOX
SUBJECTIVE:    Patient is a 76y old Female who presents with a chief complaint of generalized weakness, (24 Dec 2021 11:30)    Currently admitted to medicine with the primary diagnosis of Acute UTI       Today is hospital day 3d.     PAST MEDICAL & SURGICAL HISTORY  Hypertension    Dementia    Chronic GERD    History of TIA (transient ischemic attack)    Seizure disorder    Folate deficiency    Vitamin B12 deficiency    S/P AKA (above knee amputation), left    H/O left nephrectomy      ALLERGIES:  adhesives (Unknown)  No Known Drug Allergies    MEDICATIONS:  STANDING MEDICATIONS  aspirin enteric coated 81 milliGRAM(s) Oral daily  atorvastatin 40 milliGRAM(s) Oral at bedtime  diVALproex  milliGRAM(s) Oral two times a day  folic acid 1 milliGRAM(s) Oral daily  gabapentin 100 milliGRAM(s) Oral every 8 hours  heparin   Injectable 5000 Unit(s) SubCutaneous every 12 hours  metoprolol tartrate 25 milliGRAM(s) Oral two times a day  QUEtiapine 100 milliGRAM(s) Oral at bedtime  vancomycin    Solution 125 milliGRAM(s) Oral every 6 hours    PRN MEDICATIONS    VITALS:   T(F): 97.9  HR: 86  BP: 148/67  RR: 18  SpO2: 98%    LABS:                        12.6   5.26  )-----------( 129      ( 25 Dec 2021 04:30 )             38.6     12-25    142  |  108  |  11  ----------------------------<  89  4.0   |  17  |  0.7    Ca    9.5      25 Dec 2021 04:30  Mg     2.2     12-25    TPro  6.4  /  Alb  3.3<L>  /  TBili  0.3  /  DBili  x   /  AST  11  /  ALT  6   /  AlkPhos  124<H>  12-25              Culture - Urine (collected 22 Dec 2021 19:50)  Source: Clean Catch Clean Catch (Midstream)  Final Report (24 Dec 2021 05:19):    <10,000 CFU/mL Normal Urogenital Marylin          RADIOLOGY:    PHYSICAL EXAM:  GEN: No acute distress  LUNGS: Clear to auscultation bilaterally   HEART: S1/S2 present. RRR.   ABD/ GI: Soft, non-tender, non-distended. Bowel sounds present  EXT: NC/NC/NE/2+PP/CHESTER  NEURO: Awake and alert

## 2021-12-26 NOTE — PROGRESS NOTE ADULT - SUBJECTIVE AND OBJECTIVE BOX
SUBJECTIVE:    Patient is a 76y old Female who presents with a chief complaint of generalized weakness, (25 Dec 2021 16:10)    Currently admitted to medicine with the primary diagnosis of Acute UTI       Today is hospital day 4d.     PAST MEDICAL & SURGICAL HISTORY  Hypertension    Dementia    Chronic GERD    History of TIA (transient ischemic attack)    Seizure disorder    Folate deficiency    Vitamin B12 deficiency    S/P AKA (above knee amputation), left    H/O left nephrectomy      ALLERGIES:  adhesives (Unknown)  No Known Drug Allergies    MEDICATIONS:  STANDING MEDICATIONS  aspirin enteric coated 81 milliGRAM(s) Oral daily  atorvastatin 40 milliGRAM(s) Oral at bedtime  diVALproex  milliGRAM(s) Oral two times a day  folic acid 1 milliGRAM(s) Oral daily  gabapentin 100 milliGRAM(s) Oral every 8 hours  heparin   Injectable 5000 Unit(s) SubCutaneous every 12 hours  metoprolol tartrate 25 milliGRAM(s) Oral two times a day  QUEtiapine 100 milliGRAM(s) Oral at bedtime  vancomycin    Solution 125 milliGRAM(s) Oral every 6 hours    PRN MEDICATIONS    VITALS:   T(F): 96.1  HR: 70  BP: 145/74  RR: 18  SpO2: 93%    LABS:                        13.3   5.08  )-----------( 144      ( 26 Dec 2021 06:30 )             40.4     12-26    140  |  106  |  11  ----------------------------<  107<H>  3.5   |  17  |  0.7    Ca    9.8      26 Dec 2021 06:30  Mg     2.2     12-25    TPro  6.4  /  Alb  3.3<L>  /  TBili  0.3  /  DBili  x   /  AST  11  /  ALT  6   /  AlkPhos  124<H>  12-25                  RADIOLOGY:    PHYSICAL EXAM:  GEN: No acute distress  LUNGS: Clear to auscultation bilaterally   HEART: S1/S2 present. RRR.   ABD/ GI: Soft, non-tender, non-distended. Bowel sounds present  EXT: NC/NC/NE/2+PP/CHESTER  NEURO: confused

## 2021-12-26 NOTE — DISCHARGE NOTE NURSING/CASE MANAGEMENT/SOCIAL WORK - PATIENT PORTAL LINK FT
You can access the FollowMyHealth Patient Portal offered by [facility] by registering at the following website: [email or web address]. By joining [facility]’s FollowMyHealth portal, you will also be able to view your health information using other applications (apps) compatible with our system.

## 2021-12-26 NOTE — DISCHARGE NOTE NURSING/CASE MANAGEMENT/SOCIAL WORK - NSDCPEFALRISK_GEN_ALL_CORE
For information on Fall & Injury Prevention, visit: [email or web address] OR  [email or web address] OR  https://www.cdc.gov/steadi/patient.html
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
## 2022-01-01 ENCOUNTER — NON-APPOINTMENT (OUTPATIENT)
Age: 77
End: 2022-01-01

## 2022-01-01 ENCOUNTER — RX RENEWAL (OUTPATIENT)
Age: 77
End: 2022-01-01

## 2022-01-01 ENCOUNTER — TRANSCRIPTION ENCOUNTER (OUTPATIENT)
Age: 77
End: 2022-01-01

## 2022-01-01 ENCOUNTER — APPOINTMENT (OUTPATIENT)
Dept: NEUROLOGY | Facility: CLINIC | Age: 77
End: 2022-01-01

## 2022-01-01 ENCOUNTER — INPATIENT (INPATIENT)
Facility: HOSPITAL | Age: 77
LOS: 3 days | Discharge: HOME | End: 2022-01-22
Attending: INTERNAL MEDICINE | Admitting: INTERNAL MEDICINE
Payer: MEDICARE

## 2022-01-01 ENCOUNTER — APPOINTMENT (OUTPATIENT)
Dept: NEUROLOGY | Facility: CLINIC | Age: 77
End: 2022-01-01
Payer: MEDICARE

## 2022-01-01 ENCOUNTER — INPATIENT (INPATIENT)
Facility: HOSPITAL | Age: 77
LOS: 8 days | Discharge: HOME | End: 2022-02-11
Attending: STUDENT IN AN ORGANIZED HEALTH CARE EDUCATION/TRAINING PROGRAM | Admitting: STUDENT IN AN ORGANIZED HEALTH CARE EDUCATION/TRAINING PROGRAM
Payer: MEDICARE

## 2022-01-01 ENCOUNTER — EMERGENCY (EMERGENCY)
Facility: HOSPITAL | Age: 77
LOS: 0 days | Discharge: HOME | End: 2022-02-17
Attending: EMERGENCY MEDICINE | Admitting: EMERGENCY MEDICINE
Payer: MEDICARE

## 2022-01-01 ENCOUNTER — RX CHANGE (OUTPATIENT)
Age: 77
End: 2022-01-01

## 2022-01-01 ENCOUNTER — OUTPATIENT (OUTPATIENT)
Dept: OUTPATIENT SERVICES | Facility: HOSPITAL | Age: 77
LOS: 1 days | Discharge: HOME | End: 2022-01-01

## 2022-01-01 VITALS
RESPIRATION RATE: 18 BRPM | TEMPERATURE: 97 F | OXYGEN SATURATION: 94 % | DIASTOLIC BLOOD PRESSURE: 68 MMHG | HEART RATE: 82 BPM | SYSTOLIC BLOOD PRESSURE: 134 MMHG

## 2022-01-01 VITALS
HEART RATE: 103 BPM | OXYGEN SATURATION: 100 % | DIASTOLIC BLOOD PRESSURE: 66 MMHG | HEIGHT: 59 IN | SYSTOLIC BLOOD PRESSURE: 122 MMHG | RESPIRATION RATE: 18 BRPM | TEMPERATURE: 99 F

## 2022-01-01 VITALS
TEMPERATURE: 98 F | SYSTOLIC BLOOD PRESSURE: 145 MMHG | RESPIRATION RATE: 18 BRPM | HEART RATE: 115 BPM | OXYGEN SATURATION: 100 % | HEIGHT: 59 IN | DIASTOLIC BLOOD PRESSURE: 88 MMHG

## 2022-01-01 VITALS
SYSTOLIC BLOOD PRESSURE: 163 MMHG | RESPIRATION RATE: 18 BRPM | DIASTOLIC BLOOD PRESSURE: 72 MMHG | OXYGEN SATURATION: 96 % | HEART RATE: 124 BPM | HEIGHT: 59 IN

## 2022-01-01 VITALS
RESPIRATION RATE: 18 BRPM | OXYGEN SATURATION: 98 % | SYSTOLIC BLOOD PRESSURE: 156 MMHG | DIASTOLIC BLOOD PRESSURE: 93 MMHG | TEMPERATURE: 98 F | HEART RATE: 104 BPM

## 2022-01-01 DIAGNOSIS — Z90.5 ACQUIRED ABSENCE OF KIDNEY: ICD-10-CM

## 2022-01-01 DIAGNOSIS — Z91.048 OTHER NONMEDICINAL SUBSTANCE ALLERGY STATUS: ICD-10-CM

## 2022-01-01 DIAGNOSIS — K21.9 GASTRO-ESOPHAGEAL REFLUX DISEASE WITHOUT ESOPHAGITIS: ICD-10-CM

## 2022-01-01 DIAGNOSIS — R00.0 TACHYCARDIA, UNSPECIFIED: ICD-10-CM

## 2022-01-01 DIAGNOSIS — G40.909 EPILEPSY, UNSPECIFIED, NOT INTRACTABLE, WITHOUT STATUS EPILEPTICUS: ICD-10-CM

## 2022-01-01 DIAGNOSIS — N13.6 PYONEPHROSIS: ICD-10-CM

## 2022-01-01 DIAGNOSIS — Z90.5 ACQUIRED ABSENCE OF KIDNEY: Chronic | ICD-10-CM

## 2022-01-01 DIAGNOSIS — A04.72 ENTEROCOLITIS DUE TO CLOSTRIDIUM DIFFICILE, NOT SPECIFIED AS RECURRENT: ICD-10-CM

## 2022-01-01 DIAGNOSIS — U07.1 COVID-19: ICD-10-CM

## 2022-01-01 DIAGNOSIS — I10 ESSENTIAL (PRIMARY) HYPERTENSION: ICD-10-CM

## 2022-01-01 DIAGNOSIS — R41.82 ALTERED MENTAL STATUS, UNSPECIFIED: ICD-10-CM

## 2022-01-01 DIAGNOSIS — I69.392 FACIAL WEAKNESS FOLLOWING CEREBRAL INFARCTION: ICD-10-CM

## 2022-01-01 DIAGNOSIS — Z79.82 LONG TERM (CURRENT) USE OF ASPIRIN: ICD-10-CM

## 2022-01-01 DIAGNOSIS — N39.0 URINARY TRACT INFECTION, SITE NOT SPECIFIED: ICD-10-CM

## 2022-01-01 DIAGNOSIS — F03.90 UNSPECIFIED DEMENTIA W/OUT BEHAVIORAL DISTURBANCE: ICD-10-CM

## 2022-01-01 DIAGNOSIS — Z89.612 ACQUIRED ABSENCE OF LEFT LEG ABOVE KNEE: ICD-10-CM

## 2022-01-01 DIAGNOSIS — R53.1 WEAKNESS: ICD-10-CM

## 2022-01-01 DIAGNOSIS — G30.9 ALZHEIMER'S DISEASE, UNSPECIFIED: ICD-10-CM

## 2022-01-01 DIAGNOSIS — E78.5 HYPERLIPIDEMIA, UNSPECIFIED: ICD-10-CM

## 2022-01-01 DIAGNOSIS — G40.911 EPILEPSY, UNSPECIFIED, INTRACTABLE, WITH STATUS EPILEPTICUS: ICD-10-CM

## 2022-01-01 DIAGNOSIS — F02.80 DEMENTIA IN OTHER DISEASES CLASSIFIED ELSEWHERE, UNSPECIFIED SEVERITY, WITHOUT BEHAVIORAL DISTURBANCE, PSYCHOTIC DISTURBANCE, MOOD DISTURBANCE, AND ANXIETY: ICD-10-CM

## 2022-01-01 DIAGNOSIS — E53.8 DEFICIENCY OF OTHER SPECIFIED B GROUP VITAMINS: ICD-10-CM

## 2022-01-01 DIAGNOSIS — G40.401 OTHER GENERALIZED EPILEPSY AND EPILEPTIC SYNDROMES, NOT INTRACTABLE, WITH STATUS EPILEPTICUS: ICD-10-CM

## 2022-01-01 DIAGNOSIS — R56.9 UNSPECIFIED CONVULSIONS: ICD-10-CM

## 2022-01-01 DIAGNOSIS — G93.41 METABOLIC ENCEPHALOPATHY: ICD-10-CM

## 2022-01-01 DIAGNOSIS — N30.00 ACUTE CYSTITIS WITHOUT HEMATURIA: ICD-10-CM

## 2022-01-01 DIAGNOSIS — Z20.822 CONTACT WITH AND (SUSPECTED) EXPOSURE TO COVID-19: ICD-10-CM

## 2022-01-01 DIAGNOSIS — A41.51 SEPSIS DUE TO ESCHERICHIA COLI [E. COLI]: ICD-10-CM

## 2022-01-01 DIAGNOSIS — Z89.612 ACQUIRED ABSENCE OF LEFT LEG ABOVE KNEE: Chronic | ICD-10-CM

## 2022-01-01 DIAGNOSIS — F05 DELIRIUM DUE TO KNOWN PHYSIOLOGICAL CONDITION: ICD-10-CM

## 2022-01-01 DIAGNOSIS — Z86.19 PERSONAL HISTORY OF OTHER INFECTIOUS AND PARASITIC DISEASES: ICD-10-CM

## 2022-01-01 DIAGNOSIS — J12.82 PNEUMONIA DUE TO CORONAVIRUS DISEASE 2019: ICD-10-CM

## 2022-01-01 DIAGNOSIS — R25.1 TREMOR, UNSPECIFIED: ICD-10-CM

## 2022-01-01 DIAGNOSIS — L89.152 PRESSURE ULCER OF SACRAL REGION, STAGE 2: ICD-10-CM

## 2022-01-01 DIAGNOSIS — G93.40 ENCEPHALOPATHY, UNSPECIFIED: ICD-10-CM

## 2022-01-01 DIAGNOSIS — E46 UNSPECIFIED PROTEIN-CALORIE MALNUTRITION: ICD-10-CM

## 2022-01-01 DIAGNOSIS — K44.9 DIAPHRAGMATIC HERNIA WITHOUT OBSTRUCTION OR GANGRENE: ICD-10-CM

## 2022-01-01 DIAGNOSIS — I69.992 FACIAL WEAKNESS FOLLOWING UNSPECIFIED CEREBROVASCULAR DISEASE: ICD-10-CM

## 2022-01-01 DIAGNOSIS — I69.354 HEMIPLEGIA AND HEMIPARESIS FOLLOWING CEREBRAL INFARCTION AFFECTING LEFT NON-DOMINANT SIDE: ICD-10-CM

## 2022-01-01 DIAGNOSIS — E44.0 MODERATE PROTEIN-CALORIE MALNUTRITION: ICD-10-CM

## 2022-01-01 DIAGNOSIS — Z86.73 PERSONAL HISTORY OF TRANSIENT ISCHEMIC ATTACK (TIA), AND CEREBRAL INFARCTION WITHOUT RESIDUAL DEFICITS: ICD-10-CM

## 2022-01-01 DIAGNOSIS — Z88.8 ALLERGY STATUS TO OTHER DRUGS, MEDICAMENTS AND BIOLOGICAL SUBSTANCES: ICD-10-CM

## 2022-01-01 DIAGNOSIS — Z78.1 PHYSICAL RESTRAINT STATUS: ICD-10-CM

## 2022-01-01 DIAGNOSIS — R33.9 RETENTION OF URINE, UNSPECIFIED: ICD-10-CM

## 2022-01-01 DIAGNOSIS — J96.01 ACUTE RESPIRATORY FAILURE WITH HYPOXIA: ICD-10-CM

## 2022-01-01 DIAGNOSIS — G40.909 EPILEPSY, UNSPECIFIED, NOT INTRACTABLE, W/OUT STATUS EPILEPTICUS: ICD-10-CM

## 2022-01-01 DIAGNOSIS — E83.42 HYPOMAGNESEMIA: ICD-10-CM

## 2022-01-01 DIAGNOSIS — M62.58 MUSCLE WASTING AND ATROPHY, NOT ELSEWHERE CLASSIFIED, OTHER SITE: ICD-10-CM

## 2022-01-01 LAB
-  AMIKACIN: SIGNIFICANT CHANGE UP
-  AMOXICILLIN/CLAVULANIC ACID: SIGNIFICANT CHANGE UP
-  AMOXICILLIN/CLAVULANIC ACID: SIGNIFICANT CHANGE UP
-  AMPICILLIN/SULBACTAM: SIGNIFICANT CHANGE UP
-  AMPICILLIN: SIGNIFICANT CHANGE UP
-  AZTREONAM: SIGNIFICANT CHANGE UP
-  CEFAZOLIN: SIGNIFICANT CHANGE UP
-  CEFEPIME: SIGNIFICANT CHANGE UP
-  CEFOXITIN: SIGNIFICANT CHANGE UP
-  CEFTAZIDIME: SIGNIFICANT CHANGE UP
-  CEFTRIAXONE: SIGNIFICANT CHANGE UP
-  CIPROFLOXACIN: SIGNIFICANT CHANGE UP
-  ERTAPENEM: SIGNIFICANT CHANGE UP
-  GENTAMICIN: SIGNIFICANT CHANGE UP
-  IMIPENEM: SIGNIFICANT CHANGE UP
-  LEVOFLOXACIN: SIGNIFICANT CHANGE UP
-  MEROPENEM: SIGNIFICANT CHANGE UP
-  NITROFURANTOIN: SIGNIFICANT CHANGE UP
-  NITROFURANTOIN: SIGNIFICANT CHANGE UP
-  PIPERACILLIN/TAZOBACTAM: SIGNIFICANT CHANGE UP
-  TIGECYCLINE: SIGNIFICANT CHANGE UP
-  TIGECYCLINE: SIGNIFICANT CHANGE UP
-  TOBRAMYCIN: SIGNIFICANT CHANGE UP
-  TRIMETHOPRIM/SULFAMETHOXAZOLE: SIGNIFICANT CHANGE UP
ALBUMIN SERPL ELPH-MCNC: 3.4 G/DL — LOW (ref 3.5–5.2)
ALBUMIN SERPL ELPH-MCNC: 3.4 G/DL — LOW (ref 3.5–5.2)
ALBUMIN SERPL ELPH-MCNC: 3.5 G/DL — SIGNIFICANT CHANGE UP (ref 3.5–5.2)
ALBUMIN SERPL ELPH-MCNC: 3.7 G/DL — SIGNIFICANT CHANGE UP (ref 3.5–5.2)
ALBUMIN SERPL ELPH-MCNC: 3.8 G/DL — SIGNIFICANT CHANGE UP (ref 3.5–5.2)
ALBUMIN SERPL ELPH-MCNC: 3.9 G/DL — SIGNIFICANT CHANGE UP (ref 3.5–5.2)
ALBUMIN SERPL ELPH-MCNC: 3.9 G/DL — SIGNIFICANT CHANGE UP (ref 3.5–5.2)
ALBUMIN SERPL ELPH-MCNC: 4 G/DL — SIGNIFICANT CHANGE UP (ref 3.5–5.2)
ALBUMIN SERPL ELPH-MCNC: 4.1 G/DL — SIGNIFICANT CHANGE UP (ref 3.5–5.2)
ALBUMIN SERPL ELPH-MCNC: 4.2 G/DL — SIGNIFICANT CHANGE UP (ref 3.5–5.2)
ALP SERPL-CCNC: 103 U/L — SIGNIFICANT CHANGE UP (ref 30–115)
ALP SERPL-CCNC: 106 U/L — SIGNIFICANT CHANGE UP (ref 30–115)
ALP SERPL-CCNC: 107 U/L — SIGNIFICANT CHANGE UP (ref 30–115)
ALP SERPL-CCNC: 113 U/L — SIGNIFICANT CHANGE UP (ref 30–115)
ALP SERPL-CCNC: 114 U/L — SIGNIFICANT CHANGE UP (ref 30–115)
ALP SERPL-CCNC: 117 U/L — HIGH (ref 30–115)
ALP SERPL-CCNC: 118 U/L — HIGH (ref 30–115)
ALP SERPL-CCNC: 123 U/L — HIGH (ref 30–115)
ALP SERPL-CCNC: 148 U/L — HIGH (ref 30–115)
ALP SERPL-CCNC: 155 U/L — HIGH (ref 30–115)
ALP SERPL-CCNC: 89 U/L — SIGNIFICANT CHANGE UP (ref 30–115)
ALP SERPL-CCNC: 93 U/L — SIGNIFICANT CHANGE UP (ref 30–115)
ALT FLD-CCNC: 10 U/L — SIGNIFICANT CHANGE UP (ref 0–41)
ALT FLD-CCNC: 11 U/L — SIGNIFICANT CHANGE UP (ref 0–41)
ALT FLD-CCNC: 11 U/L — SIGNIFICANT CHANGE UP (ref 0–41)
ALT FLD-CCNC: 12 U/L — SIGNIFICANT CHANGE UP (ref 0–41)
ALT FLD-CCNC: 13 U/L — SIGNIFICANT CHANGE UP (ref 0–41)
ALT FLD-CCNC: 15 U/L — SIGNIFICANT CHANGE UP (ref 0–41)
ALT FLD-CCNC: 15 U/L — SIGNIFICANT CHANGE UP (ref 0–41)
ALT FLD-CCNC: 17 U/L — SIGNIFICANT CHANGE UP (ref 0–41)
AMMONIA BLD-MCNC: 22 UMOL/L — SIGNIFICANT CHANGE UP (ref 11–55)
ANION GAP SERPL CALC-SCNC: 10 MMOL/L — SIGNIFICANT CHANGE UP (ref 7–14)
ANION GAP SERPL CALC-SCNC: 11 MMOL/L — SIGNIFICANT CHANGE UP (ref 7–14)
ANION GAP SERPL CALC-SCNC: 11 MMOL/L — SIGNIFICANT CHANGE UP (ref 7–14)
ANION GAP SERPL CALC-SCNC: 12 MMOL/L — SIGNIFICANT CHANGE UP (ref 7–14)
ANION GAP SERPL CALC-SCNC: 12 MMOL/L — SIGNIFICANT CHANGE UP (ref 7–14)
ANION GAP SERPL CALC-SCNC: 13 MMOL/L — SIGNIFICANT CHANGE UP (ref 7–14)
ANION GAP SERPL CALC-SCNC: 14 MMOL/L — SIGNIFICANT CHANGE UP (ref 7–14)
ANION GAP SERPL CALC-SCNC: 15 MMOL/L — HIGH (ref 7–14)
ANION GAP SERPL CALC-SCNC: 16 MMOL/L — HIGH (ref 7–14)
ANION GAP SERPL CALC-SCNC: 17 MMOL/L — HIGH (ref 7–14)
ANION GAP SERPL CALC-SCNC: 17 MMOL/L — HIGH (ref 7–14)
ANION GAP SERPL CALC-SCNC: 18 MMOL/L — HIGH (ref 7–14)
ANION GAP SERPL CALC-SCNC: 18 MMOL/L — HIGH (ref 7–14)
ANION GAP SERPL CALC-SCNC: 9 MMOL/L — SIGNIFICANT CHANGE UP (ref 7–14)
APPEARANCE UR: ABNORMAL
APTT BLD: 45.6 SEC — HIGH (ref 27–39.2)
AST SERPL-CCNC: 12 U/L — SIGNIFICANT CHANGE UP (ref 0–41)
AST SERPL-CCNC: 13 U/L — SIGNIFICANT CHANGE UP (ref 0–41)
AST SERPL-CCNC: 13 U/L — SIGNIFICANT CHANGE UP (ref 0–41)
AST SERPL-CCNC: 14 U/L — SIGNIFICANT CHANGE UP (ref 0–41)
AST SERPL-CCNC: 15 U/L — SIGNIFICANT CHANGE UP (ref 0–41)
AST SERPL-CCNC: 16 U/L — SIGNIFICANT CHANGE UP (ref 0–41)
AST SERPL-CCNC: 17 U/L — SIGNIFICANT CHANGE UP (ref 0–41)
AST SERPL-CCNC: 17 U/L — SIGNIFICANT CHANGE UP (ref 0–41)
AST SERPL-CCNC: 18 U/L — SIGNIFICANT CHANGE UP (ref 0–41)
AST SERPL-CCNC: 21 U/L — SIGNIFICANT CHANGE UP (ref 0–41)
AST SERPL-CCNC: 22 U/L — SIGNIFICANT CHANGE UP (ref 0–41)
AST SERPL-CCNC: 28 U/L — SIGNIFICANT CHANGE UP (ref 0–41)
BACTERIA # UR AUTO: ABNORMAL
BACTERIA # UR AUTO: NEGATIVE — SIGNIFICANT CHANGE UP
BASE EXCESS BLDV CALC-SCNC: 2.2 MMOL/L — SIGNIFICANT CHANGE UP (ref -2–3)
BASOPHILS # BLD AUTO: 0.01 K/UL — SIGNIFICANT CHANGE UP (ref 0–0.2)
BASOPHILS # BLD AUTO: 0.02 K/UL — SIGNIFICANT CHANGE UP (ref 0–0.2)
BASOPHILS # BLD AUTO: 0.04 K/UL — SIGNIFICANT CHANGE UP (ref 0–0.2)
BASOPHILS # BLD AUTO: 0.06 K/UL — SIGNIFICANT CHANGE UP (ref 0–0.2)
BASOPHILS NFR BLD AUTO: 0.2 % — SIGNIFICANT CHANGE UP (ref 0–1)
BASOPHILS NFR BLD AUTO: 0.2 % — SIGNIFICANT CHANGE UP (ref 0–1)
BASOPHILS NFR BLD AUTO: 0.6 % — SIGNIFICANT CHANGE UP (ref 0–1)
BASOPHILS NFR BLD AUTO: 0.6 % — SIGNIFICANT CHANGE UP (ref 0–1)
BILIRUB SERPL-MCNC: 0.2 MG/DL — SIGNIFICANT CHANGE UP (ref 0.2–1.2)
BILIRUB SERPL-MCNC: 0.3 MG/DL — SIGNIFICANT CHANGE UP (ref 0.2–1.2)
BILIRUB SERPL-MCNC: 0.4 MG/DL — SIGNIFICANT CHANGE UP (ref 0.2–1.2)
BILIRUB SERPL-MCNC: 0.6 MG/DL — SIGNIFICANT CHANGE UP (ref 0.2–1.2)
BILIRUB SERPL-MCNC: 0.7 MG/DL — SIGNIFICANT CHANGE UP (ref 0.2–1.2)
BILIRUB SERPL-MCNC: 0.7 MG/DL — SIGNIFICANT CHANGE UP (ref 0.2–1.2)
BILIRUB SERPL-MCNC: 0.9 MG/DL — SIGNIFICANT CHANGE UP (ref 0.2–1.2)
BILIRUB SERPL-MCNC: 0.9 MG/DL — SIGNIFICANT CHANGE UP (ref 0.2–1.2)
BILIRUB UR-MCNC: NEGATIVE — SIGNIFICANT CHANGE UP
BUN SERPL-MCNC: 10 MG/DL — SIGNIFICANT CHANGE UP (ref 10–20)
BUN SERPL-MCNC: 11 MG/DL — SIGNIFICANT CHANGE UP (ref 10–20)
BUN SERPL-MCNC: 12 MG/DL — SIGNIFICANT CHANGE UP (ref 10–20)
BUN SERPL-MCNC: 12 MG/DL — SIGNIFICANT CHANGE UP (ref 10–20)
BUN SERPL-MCNC: 14 MG/DL — SIGNIFICANT CHANGE UP (ref 10–20)
BUN SERPL-MCNC: 17 MG/DL — SIGNIFICANT CHANGE UP (ref 10–20)
BUN SERPL-MCNC: 22 MG/DL — HIGH (ref 10–20)
BUN SERPL-MCNC: 24 MG/DL — HIGH (ref 10–20)
BUN SERPL-MCNC: 30 MG/DL — HIGH (ref 10–20)
BUN SERPL-MCNC: 9 MG/DL — LOW (ref 10–20)
BUN SERPL-MCNC: 9 MG/DL — LOW (ref 10–20)
C DIFF BY PCR RESULT: POSITIVE
C DIFF TOX GENS STL QL NAA+PROBE: SIGNIFICANT CHANGE UP
CA-I SERPL-SCNC: 1.35 MMOL/L — HIGH (ref 1.15–1.33)
CALCIUM SERPL-MCNC: 10.1 MG/DL — SIGNIFICANT CHANGE UP (ref 8.5–10.1)
CALCIUM SERPL-MCNC: 10.1 MG/DL — SIGNIFICANT CHANGE UP (ref 8.5–10.1)
CALCIUM SERPL-MCNC: 10.2 MG/DL — HIGH (ref 8.5–10.1)
CALCIUM SERPL-MCNC: 10.2 MG/DL — HIGH (ref 8.5–10.1)
CALCIUM SERPL-MCNC: 10.4 MG/DL — HIGH (ref 8.5–10.1)
CALCIUM SERPL-MCNC: 10.5 MG/DL — HIGH (ref 8.5–10.1)
CALCIUM SERPL-MCNC: 10.5 MG/DL — HIGH (ref 8.5–10.1)
CALCIUM SERPL-MCNC: 9.1 MG/DL — SIGNIFICANT CHANGE UP (ref 8.5–10.1)
CALCIUM SERPL-MCNC: 9.2 MG/DL — SIGNIFICANT CHANGE UP (ref 8.5–10.1)
CALCIUM SERPL-MCNC: 9.3 MG/DL — SIGNIFICANT CHANGE UP (ref 8.5–10.1)
CALCIUM SERPL-MCNC: 9.3 MG/DL — SIGNIFICANT CHANGE UP (ref 8.5–10.1)
CALCIUM SERPL-MCNC: 9.9 MG/DL — SIGNIFICANT CHANGE UP (ref 8.5–10.1)
CHLORIDE SERPL-SCNC: 102 MMOL/L — SIGNIFICANT CHANGE UP (ref 98–110)
CHLORIDE SERPL-SCNC: 102 MMOL/L — SIGNIFICANT CHANGE UP (ref 98–110)
CHLORIDE SERPL-SCNC: 104 MMOL/L — SIGNIFICANT CHANGE UP (ref 98–110)
CHLORIDE SERPL-SCNC: 106 MMOL/L — SIGNIFICANT CHANGE UP (ref 98–110)
CHLORIDE SERPL-SCNC: 106 MMOL/L — SIGNIFICANT CHANGE UP (ref 98–110)
CHLORIDE SERPL-SCNC: 107 MMOL/L — SIGNIFICANT CHANGE UP (ref 98–110)
CHLORIDE SERPL-SCNC: 108 MMOL/L — SIGNIFICANT CHANGE UP (ref 98–110)
CHLORIDE SERPL-SCNC: 109 MMOL/L — SIGNIFICANT CHANGE UP (ref 98–110)
CK SERPL-CCNC: 34 U/L — SIGNIFICANT CHANGE UP (ref 0–225)
CK SERPL-CCNC: 39 U/L — SIGNIFICANT CHANGE UP (ref 0–225)
CK SERPL-CCNC: 42 U/L — SIGNIFICANT CHANGE UP (ref 0–225)
CO2 SERPL-SCNC: 16 MMOL/L — LOW (ref 17–32)
CO2 SERPL-SCNC: 19 MMOL/L — SIGNIFICANT CHANGE UP (ref 17–32)
CO2 SERPL-SCNC: 20 MMOL/L — SIGNIFICANT CHANGE UP (ref 17–32)
CO2 SERPL-SCNC: 21 MMOL/L — SIGNIFICANT CHANGE UP (ref 17–32)
CO2 SERPL-SCNC: 21 MMOL/L — SIGNIFICANT CHANGE UP (ref 17–32)
CO2 SERPL-SCNC: 22 MMOL/L — SIGNIFICANT CHANGE UP (ref 17–32)
CO2 SERPL-SCNC: 22 MMOL/L — SIGNIFICANT CHANGE UP (ref 17–32)
CO2 SERPL-SCNC: 23 MMOL/L — SIGNIFICANT CHANGE UP (ref 17–32)
CO2 SERPL-SCNC: 24 MMOL/L — SIGNIFICANT CHANGE UP (ref 17–32)
CO2 SERPL-SCNC: 24 MMOL/L — SIGNIFICANT CHANGE UP (ref 17–32)
CO2 SERPL-SCNC: 25 MMOL/L — SIGNIFICANT CHANGE UP (ref 17–32)
CO2 SERPL-SCNC: 26 MMOL/L — SIGNIFICANT CHANGE UP (ref 17–32)
COLOR SPEC: SIGNIFICANT CHANGE UP
COLOR SPEC: SIGNIFICANT CHANGE UP
COLOR SPEC: YELLOW — SIGNIFICANT CHANGE UP
CREAT SERPL-MCNC: 0.6 MG/DL — LOW (ref 0.7–1.5)
CREAT SERPL-MCNC: 0.7 MG/DL — SIGNIFICANT CHANGE UP (ref 0.7–1.5)
CREAT SERPL-MCNC: 0.8 MG/DL — SIGNIFICANT CHANGE UP (ref 0.7–1.5)
CREAT SERPL-MCNC: 0.9 MG/DL — SIGNIFICANT CHANGE UP (ref 0.7–1.5)
CREAT SERPL-MCNC: 1.1 MG/DL — SIGNIFICANT CHANGE UP (ref 0.7–1.5)
CREAT SERPL-MCNC: 1.1 MG/DL — SIGNIFICANT CHANGE UP (ref 0.7–1.5)
CRP SERPL-MCNC: 24 MG/L — HIGH
CULTURE RESULTS: SIGNIFICANT CHANGE UP
D DIMER BLD IA.RAPID-MCNC: 909 NG/ML DDU — HIGH (ref 0–230)
DIFF PNL FLD: ABNORMAL
DIFF PNL FLD: NEGATIVE — SIGNIFICANT CHANGE UP
DIFF PNL FLD: SIGNIFICANT CHANGE UP
E COLI DNA BLD POS QL NAA+NON-PROBE: SIGNIFICANT CHANGE UP
EOSINOPHIL # BLD AUTO: 0 K/UL — SIGNIFICANT CHANGE UP (ref 0–0.7)
EOSINOPHIL # BLD AUTO: 0.04 K/UL — SIGNIFICANT CHANGE UP (ref 0–0.7)
EOSINOPHIL # BLD AUTO: 0.11 K/UL — SIGNIFICANT CHANGE UP (ref 0–0.7)
EOSINOPHIL # BLD AUTO: 0.29 K/UL — SIGNIFICANT CHANGE UP (ref 0–0.7)
EOSINOPHIL NFR BLD AUTO: 0 % — SIGNIFICANT CHANGE UP (ref 0–8)
EOSINOPHIL NFR BLD AUTO: 0.6 % — SIGNIFICANT CHANGE UP (ref 0–8)
EOSINOPHIL NFR BLD AUTO: 1.1 % — SIGNIFICANT CHANGE UP (ref 0–8)
EOSINOPHIL NFR BLD AUTO: 2.3 % — SIGNIFICANT CHANGE UP (ref 0–8)
EPI CELLS # UR: 0 /HPF — SIGNIFICANT CHANGE UP (ref 0–5)
EPI CELLS # UR: 0 /HPF — SIGNIFICANT CHANGE UP (ref 0–5)
ERYTHROCYTE [SEDIMENTATION RATE] IN BLOOD: 62 MM/HR — HIGH (ref 0–20)
FERRITIN SERPL-MCNC: 246 NG/ML — HIGH (ref 15–150)
GAS PNL BLDA: SIGNIFICANT CHANGE UP
GAS PNL BLDV: 141 MMOL/L — SIGNIFICANT CHANGE UP (ref 136–145)
GAS PNL BLDV: SIGNIFICANT CHANGE UP
GLUCOSE BLDC GLUCOMTR-MCNC: 107 MG/DL — HIGH (ref 70–99)
GLUCOSE BLDC GLUCOMTR-MCNC: 110 MG/DL — HIGH (ref 70–99)
GLUCOSE BLDC GLUCOMTR-MCNC: 123 MG/DL — HIGH (ref 70–99)
GLUCOSE SERPL-MCNC: 100 MG/DL — HIGH (ref 70–99)
GLUCOSE SERPL-MCNC: 100 MG/DL — HIGH (ref 70–99)
GLUCOSE SERPL-MCNC: 109 MG/DL — HIGH (ref 70–99)
GLUCOSE SERPL-MCNC: 109 MG/DL — HIGH (ref 70–99)
GLUCOSE SERPL-MCNC: 111 MG/DL — HIGH (ref 70–99)
GLUCOSE SERPL-MCNC: 115 MG/DL — HIGH (ref 70–99)
GLUCOSE SERPL-MCNC: 115 MG/DL — HIGH (ref 70–99)
GLUCOSE SERPL-MCNC: 126 MG/DL — HIGH (ref 70–99)
GLUCOSE SERPL-MCNC: 138 MG/DL — HIGH (ref 70–99)
GLUCOSE SERPL-MCNC: 152 MG/DL — HIGH (ref 70–99)
GLUCOSE SERPL-MCNC: 77 MG/DL — SIGNIFICANT CHANGE UP (ref 70–99)
GLUCOSE SERPL-MCNC: 85 MG/DL — SIGNIFICANT CHANGE UP (ref 70–99)
GLUCOSE SERPL-MCNC: 88 MG/DL — SIGNIFICANT CHANGE UP (ref 70–99)
GLUCOSE SERPL-MCNC: 94 MG/DL — SIGNIFICANT CHANGE UP (ref 70–99)
GLUCOSE UR QL: NEGATIVE — SIGNIFICANT CHANGE UP
GRAM STN FLD: SIGNIFICANT CHANGE UP
HCO3 BLDV-SCNC: 29 MMOL/L — SIGNIFICANT CHANGE UP (ref 22–29)
HCT VFR BLD CALC: 29.4 % — LOW (ref 37–47)
HCT VFR BLD CALC: 34.4 % — LOW (ref 37–47)
HCT VFR BLD CALC: 36.3 % — LOW (ref 37–47)
HCT VFR BLD CALC: 36.7 % — LOW (ref 37–47)
HCT VFR BLD CALC: 38.2 % — SIGNIFICANT CHANGE UP (ref 37–47)
HCT VFR BLD CALC: 38.4 % — SIGNIFICANT CHANGE UP (ref 37–47)
HCT VFR BLD CALC: 38.8 % — SIGNIFICANT CHANGE UP (ref 37–47)
HCT VFR BLD CALC: 39.3 % — SIGNIFICANT CHANGE UP (ref 37–47)
HCT VFR BLD CALC: 39.4 % — SIGNIFICANT CHANGE UP (ref 37–47)
HCT VFR BLD CALC: 39.6 % — SIGNIFICANT CHANGE UP (ref 37–47)
HCT VFR BLD CALC: 39.7 % — SIGNIFICANT CHANGE UP (ref 37–47)
HCT VFR BLD CALC: 40.6 % — SIGNIFICANT CHANGE UP (ref 37–47)
HCT VFR BLD CALC: 41.5 % — SIGNIFICANT CHANGE UP (ref 37–47)
HCT VFR BLD CALC: 42.3 % — SIGNIFICANT CHANGE UP (ref 37–47)
HCT VFR BLDA CALC: 28 % — LOW (ref 34.5–46.5)
HGB BLD CALC-MCNC: 9.2 G/DL — LOW (ref 11.7–16.1)
HGB BLD-MCNC: 11.2 G/DL — LOW (ref 12–16)
HGB BLD-MCNC: 11.6 G/DL — LOW (ref 12–16)
HGB BLD-MCNC: 11.6 G/DL — LOW (ref 12–16)
HGB BLD-MCNC: 12.1 G/DL — SIGNIFICANT CHANGE UP (ref 12–16)
HGB BLD-MCNC: 12.1 G/DL — SIGNIFICANT CHANGE UP (ref 12–16)
HGB BLD-MCNC: 12.2 G/DL — SIGNIFICANT CHANGE UP (ref 12–16)
HGB BLD-MCNC: 12.4 G/DL — SIGNIFICANT CHANGE UP (ref 12–16)
HGB BLD-MCNC: 12.6 G/DL — SIGNIFICANT CHANGE UP (ref 12–16)
HGB BLD-MCNC: 13 G/DL — SIGNIFICANT CHANGE UP (ref 12–16)
HGB BLD-MCNC: 13.5 G/DL — SIGNIFICANT CHANGE UP (ref 12–16)
HGB BLD-MCNC: 9.6 G/DL — LOW (ref 12–16)
HYALINE CASTS # UR AUTO: 0 /LPF — SIGNIFICANT CHANGE UP (ref 0–7)
HYALINE CASTS # UR AUTO: 1 /LPF — SIGNIFICANT CHANGE UP (ref 0–7)
IMM GRANULOCYTES NFR BLD AUTO: 0.1 % — SIGNIFICANT CHANGE UP (ref 0.1–0.3)
IMM GRANULOCYTES NFR BLD AUTO: 0.3 % — SIGNIFICANT CHANGE UP (ref 0.1–0.3)
IMM GRANULOCYTES NFR BLD AUTO: 0.3 % — SIGNIFICANT CHANGE UP (ref 0.1–0.3)
IMM GRANULOCYTES NFR BLD AUTO: 0.4 % — HIGH (ref 0.1–0.3)
INR BLD: 1.05 RATIO — SIGNIFICANT CHANGE UP (ref 0.65–1.3)
KETONES UR-MCNC: NEGATIVE — SIGNIFICANT CHANGE UP
LACOSAMIDE (VIMPAT) RESULT: 7.81 UG/ML — SIGNIFICANT CHANGE UP (ref 1–10)
LACTATE BLDV-MCNC: 0.8 MMOL/L — SIGNIFICANT CHANGE UP (ref 0.5–2)
LACTATE SERPL-SCNC: 0.8 MMOL/L — SIGNIFICANT CHANGE UP (ref 0.7–2)
LACTATE SERPL-SCNC: 1.6 MMOL/L — SIGNIFICANT CHANGE UP (ref 0.7–2)
LDH SERPL L TO P-CCNC: 222 — SIGNIFICANT CHANGE UP (ref 50–242)
LEUKOCYTE ESTERASE UR-ACNC: ABNORMAL
LYMPHOCYTES # BLD AUTO: 0.45 K/UL — LOW (ref 1.2–3.4)
LYMPHOCYTES # BLD AUTO: 0.58 K/UL — LOW (ref 1.2–3.4)
LYMPHOCYTES # BLD AUTO: 0.91 K/UL — LOW (ref 1.2–3.4)
LYMPHOCYTES # BLD AUTO: 1.24 K/UL — SIGNIFICANT CHANGE UP (ref 1.2–3.4)
LYMPHOCYTES # BLD AUTO: 12.6 % — LOW (ref 20.5–51.1)
LYMPHOCYTES # BLD AUTO: 6.3 % — LOW (ref 20.5–51.1)
LYMPHOCYTES # BLD AUTO: 7.1 % — LOW (ref 20.5–51.1)
LYMPHOCYTES # BLD AUTO: 9.2 % — LOW (ref 20.5–51.1)
MAGNESIUM SERPL-MCNC: 1.7 MG/DL — LOW (ref 1.8–2.4)
MAGNESIUM SERPL-MCNC: 1.7 MG/DL — LOW (ref 1.8–2.4)
MAGNESIUM SERPL-MCNC: 1.8 MG/DL — SIGNIFICANT CHANGE UP (ref 1.8–2.4)
MAGNESIUM SERPL-MCNC: 1.9 MG/DL — SIGNIFICANT CHANGE UP (ref 1.8–2.4)
MAGNESIUM SERPL-MCNC: 1.9 MG/DL — SIGNIFICANT CHANGE UP (ref 1.8–2.4)
MAGNESIUM SERPL-MCNC: 2 MG/DL — SIGNIFICANT CHANGE UP (ref 1.8–2.4)
MAGNESIUM SERPL-MCNC: 2.2 MG/DL — SIGNIFICANT CHANGE UP (ref 1.8–2.4)
MAGNESIUM SERPL-MCNC: 2.2 MG/DL — SIGNIFICANT CHANGE UP (ref 1.8–2.4)
MAGNESIUM SERPL-MCNC: 2.3 MG/DL — SIGNIFICANT CHANGE UP (ref 1.8–2.4)
MAGNESIUM SERPL-MCNC: 2.3 MG/DL — SIGNIFICANT CHANGE UP (ref 1.8–2.4)
MCHC RBC-ENTMCNC: 28.5 PG — SIGNIFICANT CHANGE UP (ref 27–31)
MCHC RBC-ENTMCNC: 28.9 PG — SIGNIFICANT CHANGE UP (ref 27–31)
MCHC RBC-ENTMCNC: 29 PG — SIGNIFICANT CHANGE UP (ref 27–31)
MCHC RBC-ENTMCNC: 29 PG — SIGNIFICANT CHANGE UP (ref 27–31)
MCHC RBC-ENTMCNC: 29.1 PG — SIGNIFICANT CHANGE UP (ref 27–31)
MCHC RBC-ENTMCNC: 29.6 PG — SIGNIFICANT CHANGE UP (ref 27–31)
MCHC RBC-ENTMCNC: 29.7 PG — SIGNIFICANT CHANGE UP (ref 27–31)
MCHC RBC-ENTMCNC: 29.9 PG — SIGNIFICANT CHANGE UP (ref 27–31)
MCHC RBC-ENTMCNC: 29.9 PG — SIGNIFICANT CHANGE UP (ref 27–31)
MCHC RBC-ENTMCNC: 30.3 PG — SIGNIFICANT CHANGE UP (ref 27–31)
MCHC RBC-ENTMCNC: 30.3 PG — SIGNIFICANT CHANGE UP (ref 27–31)
MCHC RBC-ENTMCNC: 30.4 G/DL — LOW (ref 32–37)
MCHC RBC-ENTMCNC: 30.4 PG — SIGNIFICANT CHANGE UP (ref 27–31)
MCHC RBC-ENTMCNC: 31.4 G/DL — LOW (ref 32–37)
MCHC RBC-ENTMCNC: 31.5 G/DL — LOW (ref 32–37)
MCHC RBC-ENTMCNC: 31.5 G/DL — LOW (ref 32–37)
MCHC RBC-ENTMCNC: 31.6 G/DL — LOW (ref 32–37)
MCHC RBC-ENTMCNC: 31.7 G/DL — LOW (ref 32–37)
MCHC RBC-ENTMCNC: 31.7 G/DL — LOW (ref 32–37)
MCHC RBC-ENTMCNC: 31.8 G/DL — LOW (ref 32–37)
MCHC RBC-ENTMCNC: 31.9 G/DL — LOW (ref 32–37)
MCHC RBC-ENTMCNC: 32 G/DL — SIGNIFICANT CHANGE UP (ref 32–37)
MCHC RBC-ENTMCNC: 32 G/DL — SIGNIFICANT CHANGE UP (ref 32–37)
MCHC RBC-ENTMCNC: 32.1 G/DL — SIGNIFICANT CHANGE UP (ref 32–37)
MCHC RBC-ENTMCNC: 32.6 G/DL — SIGNIFICANT CHANGE UP (ref 32–37)
MCHC RBC-ENTMCNC: 32.7 G/DL — SIGNIFICANT CHANGE UP (ref 32–37)
MCV RBC AUTO: 88.8 FL — SIGNIFICANT CHANGE UP (ref 81–99)
MCV RBC AUTO: 90.7 FL — SIGNIFICANT CHANGE UP (ref 81–99)
MCV RBC AUTO: 90.8 FL — SIGNIFICANT CHANGE UP (ref 81–99)
MCV RBC AUTO: 91.2 FL — SIGNIFICANT CHANGE UP (ref 81–99)
MCV RBC AUTO: 92.1 FL — SIGNIFICANT CHANGE UP (ref 81–99)
MCV RBC AUTO: 92.7 FL — SIGNIFICANT CHANGE UP (ref 81–99)
MCV RBC AUTO: 93 FL — SIGNIFICANT CHANGE UP (ref 81–99)
MCV RBC AUTO: 93.1 FL — SIGNIFICANT CHANGE UP (ref 81–99)
MCV RBC AUTO: 93.6 FL — SIGNIFICANT CHANGE UP (ref 81–99)
MCV RBC AUTO: 93.6 FL — SIGNIFICANT CHANGE UP (ref 81–99)
MCV RBC AUTO: 93.8 FL — SIGNIFICANT CHANGE UP (ref 81–99)
MCV RBC AUTO: 96 FL — SIGNIFICANT CHANGE UP (ref 81–99)
MCV RBC AUTO: 96.3 FL — SIGNIFICANT CHANGE UP (ref 81–99)
MCV RBC AUTO: 97.4 FL — SIGNIFICANT CHANGE UP (ref 81–99)
METHOD TYPE: SIGNIFICANT CHANGE UP
MONOCYTES # BLD AUTO: 0.19 K/UL — SIGNIFICANT CHANGE UP (ref 0.1–0.6)
MONOCYTES # BLD AUTO: 0.3 K/UL — SIGNIFICANT CHANGE UP (ref 0.1–0.6)
MONOCYTES # BLD AUTO: 0.8 K/UL — HIGH (ref 0.1–0.6)
MONOCYTES # BLD AUTO: 0.95 K/UL — HIGH (ref 0.1–0.6)
MONOCYTES NFR BLD AUTO: 3 % — SIGNIFICANT CHANGE UP (ref 1.7–9.3)
MONOCYTES NFR BLD AUTO: 4.2 % — SIGNIFICANT CHANGE UP (ref 1.7–9.3)
MONOCYTES NFR BLD AUTO: 6.3 % — SIGNIFICANT CHANGE UP (ref 1.7–9.3)
MONOCYTES NFR BLD AUTO: 9.7 % — HIGH (ref 1.7–9.3)
MRSA PCR RESULT.: NEGATIVE — SIGNIFICANT CHANGE UP
NEUTROPHILS # BLD AUTO: 10.67 K/UL — HIGH (ref 1.4–6.5)
NEUTROPHILS # BLD AUTO: 5.51 K/UL — SIGNIFICANT CHANGE UP (ref 1.4–6.5)
NEUTROPHILS # BLD AUTO: 6.35 K/UL — SIGNIFICANT CHANGE UP (ref 1.4–6.5)
NEUTROPHILS # BLD AUTO: 7.41 K/UL — HIGH (ref 1.4–6.5)
NEUTROPHILS NFR BLD AUTO: 75.6 % — HIGH (ref 42.2–75.2)
NEUTROPHILS NFR BLD AUTO: 83.8 % — HIGH (ref 42.2–75.2)
NEUTROPHILS NFR BLD AUTO: 87.3 % — HIGH (ref 42.2–75.2)
NEUTROPHILS NFR BLD AUTO: 88.2 % — HIGH (ref 42.2–75.2)
NITRITE UR-MCNC: NEGATIVE — SIGNIFICANT CHANGE UP
NITRITE UR-MCNC: POSITIVE
NITRITE UR-MCNC: POSITIVE
NRBC # BLD: 0 /100 WBCS — SIGNIFICANT CHANGE UP (ref 0–0)
ORGANISM # SPEC MICROSCOPIC CNT: SIGNIFICANT CHANGE UP
PCO2 BLDV: 58 MMHG — HIGH (ref 39–42)
PH BLDV: 7.31 — LOW (ref 7.32–7.43)
PH UR: 7.5 — SIGNIFICANT CHANGE UP (ref 5–8)
PHOSPHATE SERPL-MCNC: 2 MG/DL — LOW (ref 2.1–4.9)
PHOSPHATE SERPL-MCNC: 2.2 MG/DL — SIGNIFICANT CHANGE UP (ref 2.1–4.9)
PHOSPHATE SERPL-MCNC: 2.8 MG/DL — SIGNIFICANT CHANGE UP (ref 2.1–4.9)
PLATELET # BLD AUTO: 138 K/UL — SIGNIFICANT CHANGE UP (ref 130–400)
PLATELET # BLD AUTO: 143 K/UL — SIGNIFICANT CHANGE UP (ref 130–400)
PLATELET # BLD AUTO: 151 K/UL — SIGNIFICANT CHANGE UP (ref 130–400)
PLATELET # BLD AUTO: 163 K/UL — SIGNIFICANT CHANGE UP (ref 130–400)
PLATELET # BLD AUTO: 163 K/UL — SIGNIFICANT CHANGE UP (ref 130–400)
PLATELET # BLD AUTO: 165 K/UL — SIGNIFICANT CHANGE UP (ref 130–400)
PLATELET # BLD AUTO: 181 K/UL — SIGNIFICANT CHANGE UP (ref 130–400)
PLATELET # BLD AUTO: 184 K/UL — SIGNIFICANT CHANGE UP (ref 130–400)
PLATELET # BLD AUTO: 188 K/UL — SIGNIFICANT CHANGE UP (ref 130–400)
PLATELET # BLD AUTO: 194 K/UL — SIGNIFICANT CHANGE UP (ref 130–400)
PLATELET # BLD AUTO: 220 K/UL — SIGNIFICANT CHANGE UP (ref 130–400)
PLATELET # BLD AUTO: 228 K/UL — SIGNIFICANT CHANGE UP (ref 130–400)
PLATELET # BLD AUTO: 327 K/UL — SIGNIFICANT CHANGE UP (ref 130–400)
PLATELET # BLD AUTO: 362 K/UL — SIGNIFICANT CHANGE UP (ref 130–400)
PO2 BLDV: 68 MMHG — SIGNIFICANT CHANGE UP
POTASSIUM BLDV-SCNC: 3.4 MMOL/L — LOW (ref 3.5–5.1)
POTASSIUM SERPL-MCNC: 2.8 MMOL/L — LOW (ref 3.5–5)
POTASSIUM SERPL-MCNC: 3.3 MMOL/L — LOW (ref 3.5–5)
POTASSIUM SERPL-MCNC: 3.5 MMOL/L — SIGNIFICANT CHANGE UP (ref 3.5–5)
POTASSIUM SERPL-MCNC: 3.6 MMOL/L — SIGNIFICANT CHANGE UP (ref 3.5–5)
POTASSIUM SERPL-MCNC: 3.7 MMOL/L — SIGNIFICANT CHANGE UP (ref 3.5–5)
POTASSIUM SERPL-MCNC: 3.7 MMOL/L — SIGNIFICANT CHANGE UP (ref 3.5–5)
POTASSIUM SERPL-MCNC: 4 MMOL/L — SIGNIFICANT CHANGE UP (ref 3.5–5)
POTASSIUM SERPL-MCNC: 4.1 MMOL/L — SIGNIFICANT CHANGE UP (ref 3.5–5)
POTASSIUM SERPL-MCNC: 4.2 MMOL/L — SIGNIFICANT CHANGE UP (ref 3.5–5)
POTASSIUM SERPL-MCNC: 4.2 MMOL/L — SIGNIFICANT CHANGE UP (ref 3.5–5)
POTASSIUM SERPL-MCNC: 4.3 MMOL/L — SIGNIFICANT CHANGE UP (ref 3.5–5)
POTASSIUM SERPL-MCNC: 4.4 MMOL/L — SIGNIFICANT CHANGE UP (ref 3.5–5)
POTASSIUM SERPL-MCNC: 4.4 MMOL/L — SIGNIFICANT CHANGE UP (ref 3.5–5)
POTASSIUM SERPL-MCNC: 4.8 MMOL/L — SIGNIFICANT CHANGE UP (ref 3.5–5)
POTASSIUM SERPL-SCNC: 2.8 MMOL/L — LOW (ref 3.5–5)
POTASSIUM SERPL-SCNC: 3.3 MMOL/L — LOW (ref 3.5–5)
POTASSIUM SERPL-SCNC: 3.5 MMOL/L — SIGNIFICANT CHANGE UP (ref 3.5–5)
POTASSIUM SERPL-SCNC: 3.6 MMOL/L — SIGNIFICANT CHANGE UP (ref 3.5–5)
POTASSIUM SERPL-SCNC: 3.7 MMOL/L — SIGNIFICANT CHANGE UP (ref 3.5–5)
POTASSIUM SERPL-SCNC: 3.7 MMOL/L — SIGNIFICANT CHANGE UP (ref 3.5–5)
POTASSIUM SERPL-SCNC: 4 MMOL/L — SIGNIFICANT CHANGE UP (ref 3.5–5)
POTASSIUM SERPL-SCNC: 4.1 MMOL/L — SIGNIFICANT CHANGE UP (ref 3.5–5)
POTASSIUM SERPL-SCNC: 4.2 MMOL/L — SIGNIFICANT CHANGE UP (ref 3.5–5)
POTASSIUM SERPL-SCNC: 4.2 MMOL/L — SIGNIFICANT CHANGE UP (ref 3.5–5)
POTASSIUM SERPL-SCNC: 4.3 MMOL/L — SIGNIFICANT CHANGE UP (ref 3.5–5)
POTASSIUM SERPL-SCNC: 4.4 MMOL/L — SIGNIFICANT CHANGE UP (ref 3.5–5)
POTASSIUM SERPL-SCNC: 4.4 MMOL/L — SIGNIFICANT CHANGE UP (ref 3.5–5)
POTASSIUM SERPL-SCNC: 4.8 MMOL/L — SIGNIFICANT CHANGE UP (ref 3.5–5)
PROCALCITONIN SERPL-MCNC: 0.06 NG/ML — SIGNIFICANT CHANGE UP (ref 0.02–0.1)
PROCALCITONIN SERPL-MCNC: 0.07 NG/ML — SIGNIFICANT CHANGE UP (ref 0.02–0.1)
PROCALCITONIN SERPL-MCNC: 0.13 NG/ML — HIGH (ref 0.02–0.1)
PROT SERPL-MCNC: 5.8 G/DL — LOW (ref 6–8)
PROT SERPL-MCNC: 5.8 G/DL — LOW (ref 6–8)
PROT SERPL-MCNC: 6.1 G/DL — SIGNIFICANT CHANGE UP (ref 6–8)
PROT SERPL-MCNC: 6.5 G/DL — SIGNIFICANT CHANGE UP (ref 6–8)
PROT SERPL-MCNC: 6.6 G/DL — SIGNIFICANT CHANGE UP (ref 6–8)
PROT SERPL-MCNC: 6.6 G/DL — SIGNIFICANT CHANGE UP (ref 6–8)
PROT SERPL-MCNC: 6.7 G/DL — SIGNIFICANT CHANGE UP (ref 6–8)
PROT SERPL-MCNC: 6.9 G/DL — SIGNIFICANT CHANGE UP (ref 6–8)
PROT SERPL-MCNC: 7 G/DL — SIGNIFICANT CHANGE UP (ref 6–8)
PROT SERPL-MCNC: 7.2 G/DL — SIGNIFICANT CHANGE UP (ref 6–8)
PROT SERPL-MCNC: 7.2 G/DL — SIGNIFICANT CHANGE UP (ref 6–8)
PROT SERPL-MCNC: 7.4 G/DL — SIGNIFICANT CHANGE UP (ref 6–8)
PROT UR-MCNC: ABNORMAL
PROT UR-MCNC: SIGNIFICANT CHANGE UP
PROT UR-MCNC: SIGNIFICANT CHANGE UP
PROTHROM AB SERPL-ACNC: 12.1 SEC — SIGNIFICANT CHANGE UP (ref 9.95–12.87)
RBC # BLD: 3.31 M/UL — LOW (ref 4.2–5.4)
RBC # BLD: 3.7 M/UL — LOW (ref 4.2–5.4)
RBC # BLD: 3.81 M/UL — LOW (ref 4.2–5.4)
RBC # BLD: 3.9 M/UL — LOW (ref 4.2–5.4)
RBC # BLD: 4 M/UL — LOW (ref 4.2–5.4)
RBC # BLD: 4.19 M/UL — LOW (ref 4.2–5.4)
RBC # BLD: 4.22 M/UL — SIGNIFICANT CHANGE UP (ref 4.2–5.4)
RBC # BLD: 4.24 M/UL — SIGNIFICANT CHANGE UP (ref 4.2–5.4)
RBC # BLD: 4.26 M/UL — SIGNIFICANT CHANGE UP (ref 4.2–5.4)
RBC # BLD: 4.28 M/UL — SIGNIFICANT CHANGE UP (ref 4.2–5.4)
RBC # BLD: 4.28 M/UL — SIGNIFICANT CHANGE UP (ref 4.2–5.4)
RBC # BLD: 4.33 M/UL — SIGNIFICANT CHANGE UP (ref 4.2–5.4)
RBC # BLD: 4.38 M/UL — SIGNIFICANT CHANGE UP (ref 4.2–5.4)
RBC # BLD: 4.52 M/UL — SIGNIFICANT CHANGE UP (ref 4.2–5.4)
RBC # FLD: 15.1 % — HIGH (ref 11.5–14.5)
RBC # FLD: 15.4 % — HIGH (ref 11.5–14.5)
RBC # FLD: 15.7 % — HIGH (ref 11.5–14.5)
RBC # FLD: 15.9 % — HIGH (ref 11.5–14.5)
RBC # FLD: 17.2 % — HIGH (ref 11.5–14.5)
RBC # FLD: 17.6 % — HIGH (ref 11.5–14.5)
RBC # FLD: 17.7 % — HIGH (ref 11.5–14.5)
RBC # FLD: 18 % — HIGH (ref 11.5–14.5)
RBC # FLD: 18 % — HIGH (ref 11.5–14.5)
RBC CASTS # UR COMP ASSIST: 3 /HPF — SIGNIFICANT CHANGE UP (ref 0–4)
RBC CASTS # UR COMP ASSIST: 8 /HPF — HIGH (ref 0–4)
SAO2 % BLDV: 95.2 % — SIGNIFICANT CHANGE UP
SARS-COV-2 RNA SPEC QL NAA+PROBE: DETECTED
SARS-COV-2 RNA SPEC QL NAA+PROBE: SIGNIFICANT CHANGE UP
SODIUM SERPL-SCNC: 134 MMOL/L — LOW (ref 135–146)
SODIUM SERPL-SCNC: 137 MMOL/L — SIGNIFICANT CHANGE UP (ref 135–146)
SODIUM SERPL-SCNC: 142 MMOL/L — SIGNIFICANT CHANGE UP (ref 135–146)
SODIUM SERPL-SCNC: 143 MMOL/L — SIGNIFICANT CHANGE UP (ref 135–146)
SODIUM SERPL-SCNC: 144 MMOL/L — SIGNIFICANT CHANGE UP (ref 135–146)
SODIUM SERPL-SCNC: 144 MMOL/L — SIGNIFICANT CHANGE UP (ref 135–146)
SP GR SPEC: 1.01 — SIGNIFICANT CHANGE UP (ref 1.01–1.03)
SPECIMEN SOURCE: SIGNIFICANT CHANGE UP
T4 FREE SERPL-MCNC: 1.3 NG/DL — SIGNIFICANT CHANGE UP (ref 0.9–1.8)
TROPONIN T SERPL-MCNC: <0.01 NG/ML — SIGNIFICANT CHANGE UP
TROPONIN T SERPL-MCNC: <0.01 NG/ML — SIGNIFICANT CHANGE UP
TSH SERPL-MCNC: 0.16 UIU/ML — LOW (ref 0.27–4.2)
UROBILINOGEN FLD QL: SIGNIFICANT CHANGE UP
WBC # BLD: 10.69 K/UL — SIGNIFICANT CHANGE UP (ref 4.8–10.8)
WBC # BLD: 11.9 K/UL — HIGH (ref 4.8–10.8)
WBC # BLD: 12.73 K/UL — HIGH (ref 4.8–10.8)
WBC # BLD: 4.28 K/UL — LOW (ref 4.8–10.8)
WBC # BLD: 4.65 K/UL — LOW (ref 4.8–10.8)
WBC # BLD: 5.66 K/UL — SIGNIFICANT CHANGE UP (ref 4.8–10.8)
WBC # BLD: 6.02 K/UL — SIGNIFICANT CHANGE UP (ref 4.8–10.8)
WBC # BLD: 6.3 K/UL — SIGNIFICANT CHANGE UP (ref 4.8–10.8)
WBC # BLD: 6.31 K/UL — SIGNIFICANT CHANGE UP (ref 4.8–10.8)
WBC # BLD: 6.34 K/UL — SIGNIFICANT CHANGE UP (ref 4.8–10.8)
WBC # BLD: 6.4 K/UL — SIGNIFICANT CHANGE UP (ref 4.8–10.8)
WBC # BLD: 7.19 K/UL — SIGNIFICANT CHANGE UP (ref 4.8–10.8)
WBC # BLD: 8.15 K/UL — SIGNIFICANT CHANGE UP (ref 4.8–10.8)
WBC # BLD: 9.81 K/UL — SIGNIFICANT CHANGE UP (ref 4.8–10.8)
WBC # FLD AUTO: 10.69 K/UL — SIGNIFICANT CHANGE UP (ref 4.8–10.8)
WBC # FLD AUTO: 11.9 K/UL — HIGH (ref 4.8–10.8)
WBC # FLD AUTO: 12.73 K/UL — HIGH (ref 4.8–10.8)
WBC # FLD AUTO: 4.28 K/UL — LOW (ref 4.8–10.8)
WBC # FLD AUTO: 4.65 K/UL — LOW (ref 4.8–10.8)
WBC # FLD AUTO: 5.66 K/UL — SIGNIFICANT CHANGE UP (ref 4.8–10.8)
WBC # FLD AUTO: 6.02 K/UL — SIGNIFICANT CHANGE UP (ref 4.8–10.8)
WBC # FLD AUTO: 6.3 K/UL — SIGNIFICANT CHANGE UP (ref 4.8–10.8)
WBC # FLD AUTO: 6.31 K/UL — SIGNIFICANT CHANGE UP (ref 4.8–10.8)
WBC # FLD AUTO: 6.34 K/UL — SIGNIFICANT CHANGE UP (ref 4.8–10.8)
WBC # FLD AUTO: 6.4 K/UL — SIGNIFICANT CHANGE UP (ref 4.8–10.8)
WBC # FLD AUTO: 7.19 K/UL — SIGNIFICANT CHANGE UP (ref 4.8–10.8)
WBC # FLD AUTO: 8.15 K/UL — SIGNIFICANT CHANGE UP (ref 4.8–10.8)
WBC # FLD AUTO: 9.81 K/UL — SIGNIFICANT CHANGE UP (ref 4.8–10.8)
WBC UR QL: 214 /HPF — HIGH (ref 0–5)
WBC UR QL: 440 /HPF — HIGH (ref 0–5)

## 2022-01-01 PROCEDURE — 99232 SBSQ HOSP IP/OBS MODERATE 35: CPT

## 2022-01-01 PROCEDURE — 99233 SBSQ HOSP IP/OBS HIGH 50: CPT

## 2022-01-01 PROCEDURE — 70450 CT HEAD/BRAIN W/O DYE: CPT | Mod: 26,MA

## 2022-01-01 PROCEDURE — 95720 EEG PHY/QHP EA INCR W/VEEG: CPT

## 2022-01-01 PROCEDURE — 93010 ELECTROCARDIOGRAM REPORT: CPT

## 2022-01-01 PROCEDURE — 95819 EEG AWAKE AND ASLEEP: CPT | Mod: 26

## 2022-01-01 PROCEDURE — 99232 SBSQ HOSP IP/OBS MODERATE 35: CPT | Mod: GC

## 2022-01-01 PROCEDURE — 99291 CRITICAL CARE FIRST HOUR: CPT | Mod: CS

## 2022-01-01 PROCEDURE — 71045 X-RAY EXAM CHEST 1 VIEW: CPT | Mod: 26

## 2022-01-01 PROCEDURE — 99223 1ST HOSP IP/OBS HIGH 75: CPT

## 2022-01-01 PROCEDURE — 99283 EMERGENCY DEPT VISIT LOW MDM: CPT

## 2022-01-01 PROCEDURE — 99212 OFFICE O/P EST SF 10 MIN: CPT | Mod: 95

## 2022-01-01 PROCEDURE — 99284 EMERGENCY DEPT VISIT MOD MDM: CPT

## 2022-01-01 PROCEDURE — 74176 CT ABD & PELVIS W/O CONTRAST: CPT | Mod: 26

## 2022-01-01 PROCEDURE — 76775 US EXAM ABDO BACK WALL LIM: CPT | Mod: 26

## 2022-01-01 PROCEDURE — 93970 EXTREMITY STUDY: CPT | Mod: 26

## 2022-01-01 PROCEDURE — 99239 HOSP IP/OBS DSCHRG MGMT >30: CPT

## 2022-01-01 PROCEDURE — 99291 CRITICAL CARE FIRST HOUR: CPT

## 2022-01-01 PROCEDURE — 99222 1ST HOSP IP/OBS MODERATE 55: CPT

## 2022-01-01 PROCEDURE — 99285 EMERGENCY DEPT VISIT HI MDM: CPT | Mod: FS,CS

## 2022-01-01 RX ORDER — MAGNESIUM SULFATE 500 MG/ML
2 VIAL (ML) INJECTION ONCE
Refills: 0 | Status: COMPLETED | OUTPATIENT
Start: 2022-01-01 | End: 2022-01-01

## 2022-01-01 RX ORDER — LACOSAMIDE 50 MG/1
100 TABLET ORAL
Refills: 0 | Status: DISCONTINUED | OUTPATIENT
Start: 2022-01-01 | End: 2022-01-01

## 2022-01-01 RX ORDER — LACOSAMIDE 50 MG/1
1 TABLET ORAL
Qty: 60 | Refills: 0
Start: 2022-01-01 | End: 2022-01-01

## 2022-01-01 RX ORDER — VANCOMYCIN HCL 1 G
750 VIAL (EA) INTRAVENOUS EVERY 12 HOURS
Refills: 0 | Status: DISCONTINUED | OUTPATIENT
Start: 2022-01-01 | End: 2022-01-01

## 2022-01-01 RX ORDER — ACETAMINOPHEN 500 MG
975 TABLET ORAL ONCE
Refills: 0 | Status: COMPLETED | OUTPATIENT
Start: 2022-01-01 | End: 2022-01-01

## 2022-01-01 RX ORDER — ATORVASTATIN CALCIUM 80 MG/1
1 TABLET, FILM COATED ORAL
Qty: 0 | Refills: 0 | DISCHARGE

## 2022-01-01 RX ORDER — LACOSAMIDE 50 MG/1
50 TABLET ORAL
Qty: 60 | Refills: 5 | Status: ACTIVE | COMMUNITY
Start: 2022-01-01 | End: 1900-01-01

## 2022-01-01 RX ORDER — LACOSAMIDE 50 MG/1
150 TABLET ORAL EVERY 12 HOURS
Refills: 0 | Status: DISCONTINUED | OUTPATIENT
Start: 2022-01-01 | End: 2022-01-01

## 2022-01-01 RX ORDER — TRAZODONE HYDROCHLORIDE 50 MG/1
50 TABLET ORAL
Qty: 15 | Refills: 2 | Status: DISCONTINUED | COMMUNITY
Start: 2022-01-01 | End: 2022-01-01

## 2022-01-01 RX ORDER — AMLODIPINE BESYLATE 2.5 MG/1
1 TABLET ORAL
Qty: 0 | Refills: 0 | DISCHARGE

## 2022-01-01 RX ORDER — QUETIAPINE FUMARATE 200 MG/1
1 TABLET, FILM COATED ORAL
Qty: 30 | Refills: 0
Start: 2022-01-01 | End: 2022-01-01

## 2022-01-01 RX ORDER — LACOSAMIDE 50 MG/1
100 TABLET ORAL ONCE
Refills: 0 | Status: DISCONTINUED | OUTPATIENT
Start: 2022-01-01 | End: 2022-01-01

## 2022-01-01 RX ORDER — QUETIAPINE FUMARATE 100 MG/1
100 TABLET ORAL
Qty: 30 | Refills: 5 | Status: ACTIVE | COMMUNITY
Start: 2020-11-23 | End: 1900-01-01

## 2022-01-01 RX ORDER — GABAPENTIN 400 MG/1
1 CAPSULE ORAL
Qty: 30 | Refills: 0
Start: 2022-01-01 | End: 2022-01-01

## 2022-01-01 RX ORDER — LAMOTRIGINE 25 MG/1
0.5 TABLET, ORALLY DISINTEGRATING ORAL
Qty: 3.5 | Refills: 0
Start: 2022-01-01 | End: 2022-01-01

## 2022-01-01 RX ORDER — LACOSAMIDE 50 MG/1
100 TABLET ORAL EVERY 12 HOURS
Refills: 0 | Status: DISCONTINUED | OUTPATIENT
Start: 2022-01-01 | End: 2022-01-01

## 2022-01-01 RX ORDER — TRAZODONE HYDROCHLORIDE 50 MG/1
50 TABLET ORAL
Qty: 45 | Refills: 1 | Status: ACTIVE | COMMUNITY
Start: 2022-01-01 | End: 1900-01-01

## 2022-01-01 RX ORDER — TAMSULOSIN HYDROCHLORIDE 0.4 MG/1
2 CAPSULE ORAL
Qty: 60 | Refills: 0
Start: 2022-01-01 | End: 2022-01-01

## 2022-01-01 RX ORDER — LAMOTRIGINE 25 MG/1
25 TABLET, ORALLY DISINTEGRATING ORAL AT BEDTIME
Refills: 0 | Status: CANCELLED | OUTPATIENT
Start: 2022-01-01 | End: 2022-01-01

## 2022-01-01 RX ORDER — PANTOPRAZOLE SODIUM 20 MG/1
40 TABLET, DELAYED RELEASE ORAL DAILY
Refills: 0 | Status: DISCONTINUED | OUTPATIENT
Start: 2022-01-01 | End: 2022-01-01

## 2022-01-01 RX ORDER — CEFDINIR 250 MG/5ML
1 POWDER, FOR SUSPENSION ORAL
Qty: 14 | Refills: 0
Start: 2022-01-01 | End: 2022-01-01

## 2022-01-01 RX ORDER — CEFTRIAXONE 500 MG/1
INJECTION, POWDER, FOR SOLUTION INTRAMUSCULAR; INTRAVENOUS
Refills: 0 | Status: DISCONTINUED | OUTPATIENT
Start: 2022-01-01 | End: 2022-01-01

## 2022-01-01 RX ORDER — CEFTRIAXONE 500 MG/1
1000 INJECTION, POWDER, FOR SOLUTION INTRAMUSCULAR; INTRAVENOUS ONCE
Refills: 0 | Status: COMPLETED | OUTPATIENT
Start: 2022-01-01 | End: 2022-01-01

## 2022-01-01 RX ORDER — SODIUM CHLORIDE 9 MG/ML
500 INJECTION INTRAMUSCULAR; INTRAVENOUS; SUBCUTANEOUS ONCE
Refills: 0 | Status: COMPLETED | OUTPATIENT
Start: 2022-01-01 | End: 2022-01-01

## 2022-01-01 RX ORDER — METOPROLOL TARTRATE 50 MG
1 TABLET ORAL
Qty: 0 | Refills: 0 | DISCHARGE

## 2022-01-01 RX ORDER — LACOSAMIDE 50 MG/1
150 TABLET ORAL ONCE
Refills: 0 | Status: DISCONTINUED | OUTPATIENT
Start: 2022-01-01 | End: 2022-01-01

## 2022-01-01 RX ORDER — LAMOTRIGINE 25 MG/1
2 TABLET, ORALLY DISINTEGRATING ORAL
Qty: 60 | Refills: 0
Start: 2022-01-01 | End: 2022-01-01

## 2022-01-01 RX ORDER — CEFTRIAXONE 500 MG/1
2000 INJECTION, POWDER, FOR SOLUTION INTRAMUSCULAR; INTRAVENOUS EVERY 24 HOURS
Refills: 0 | Status: DISCONTINUED | OUTPATIENT
Start: 2022-01-01 | End: 2022-01-01

## 2022-01-01 RX ORDER — SODIUM CHLORIDE 9 MG/ML
1000 INJECTION INTRAMUSCULAR; INTRAVENOUS; SUBCUTANEOUS
Refills: 0 | Status: DISCONTINUED | OUTPATIENT
Start: 2022-01-01 | End: 2022-01-01

## 2022-01-01 RX ORDER — VANCOMYCIN HCL 1 G
1 VIAL (EA) INTRAVENOUS
Qty: 20 | Refills: 0
Start: 2022-01-01 | End: 2022-01-01

## 2022-01-01 RX ORDER — QUETIAPINE FUMARATE 200 MG/1
25 TABLET, FILM COATED ORAL EVERY 12 HOURS
Refills: 0 | Status: DISCONTINUED | OUTPATIENT
Start: 2022-01-01 | End: 2022-01-01

## 2022-01-01 RX ORDER — LAMOTRIGINE 25 MG/1
1 TABLET, ORALLY DISINTEGRATING ORAL
Qty: 7 | Refills: 0
Start: 2022-01-01 | End: 2022-01-01

## 2022-01-01 RX ORDER — VANCOMYCIN HCL 1 G
VIAL (EA) INTRAVENOUS
Refills: 0 | Status: DISCONTINUED | OUTPATIENT
Start: 2022-01-01 | End: 2022-01-01

## 2022-01-01 RX ORDER — ESCITALOPRAM OXALATE 10 MG/1
10 TABLET ORAL DAILY
Qty: 90 | Refills: 3 | Status: ACTIVE | COMMUNITY
Start: 2021-01-01 | End: 1900-01-01

## 2022-01-01 RX ORDER — LACOSAMIDE 50 MG/1
200 TABLET ORAL ONCE
Refills: 0 | Status: DISCONTINUED | OUTPATIENT
Start: 2022-01-01 | End: 2022-01-01

## 2022-01-01 RX ORDER — NITROFURANTOIN MACROCRYSTAL 50 MG
1 CAPSULE ORAL
Qty: 90 | Refills: 0
Start: 2022-01-01 | End: 2022-01-01

## 2022-01-01 RX ORDER — ATORVASTATIN CALCIUM 80 MG/1
40 TABLET, FILM COATED ORAL AT BEDTIME
Refills: 0 | Status: DISCONTINUED | OUTPATIENT
Start: 2022-01-01 | End: 2022-01-01

## 2022-01-01 RX ORDER — LACOSAMIDE 150 MG/1
150 TABLET ORAL
Qty: 60 | Refills: 5 | Status: ACTIVE | COMMUNITY
Start: 2022-01-01 | End: 1900-01-01

## 2022-01-01 RX ORDER — LACOSAMIDE 50 MG/1
75 TABLET ORAL EVERY 12 HOURS
Refills: 0 | Status: DISCONTINUED | OUTPATIENT
Start: 2022-01-01 | End: 2022-01-01

## 2022-01-01 RX ORDER — HALOPERIDOL DECANOATE 100 MG/ML
1 INJECTION INTRAMUSCULAR ONCE
Refills: 0 | Status: DISCONTINUED | OUTPATIENT
Start: 2022-01-01 | End: 2022-01-01

## 2022-01-01 RX ORDER — QUETIAPINE FUMARATE 200 MG/1
100 TABLET, FILM COATED ORAL AT BEDTIME
Refills: 0 | Status: DISCONTINUED | OUTPATIENT
Start: 2022-01-01 | End: 2022-01-01

## 2022-01-01 RX ORDER — LACOSAMIDE 50 MG/1
200 TABLET ORAL EVERY 12 HOURS
Refills: 0 | Status: DISCONTINUED | OUTPATIENT
Start: 2022-01-01 | End: 2022-01-01

## 2022-01-01 RX ORDER — DIVALPROEX SODIUM 500 MG/1
1 TABLET, DELAYED RELEASE ORAL
Qty: 0 | Refills: 0 | DISCHARGE

## 2022-01-01 RX ORDER — LACOSAMIDE 50 MG/1
0.5 TABLET ORAL
Qty: 30 | Refills: 0
Start: 2022-01-01 | End: 2022-01-01

## 2022-01-01 RX ORDER — LACOSAMIDE 50 MG/1
50 TABLET ORAL ONCE
Refills: 0 | Status: DISCONTINUED | OUTPATIENT
Start: 2022-01-01 | End: 2022-01-01

## 2022-01-01 RX ORDER — ASPIRIN/CALCIUM CARB/MAGNESIUM 324 MG
81 TABLET ORAL DAILY
Refills: 0 | Status: DISCONTINUED | OUTPATIENT
Start: 2022-01-01 | End: 2022-01-01

## 2022-01-01 RX ORDER — LACTOBACILLUS ACIDOPHILUS 100MM CELL
1 CAPSULE ORAL DAILY
Refills: 0 | Status: DISCONTINUED | OUTPATIENT
Start: 2022-01-01 | End: 2022-01-01

## 2022-01-01 RX ORDER — POTASSIUM CHLORIDE 20 MEQ
20 PACKET (EA) ORAL ONCE
Refills: 0 | Status: COMPLETED | OUTPATIENT
Start: 2022-01-01 | End: 2022-01-01

## 2022-01-01 RX ORDER — LAMOTRIGINE 25 MG/1
12.5 TABLET, ORALLY DISINTEGRATING ORAL DAILY
Refills: 0 | Status: DISCONTINUED | OUTPATIENT
Start: 2022-01-01 | End: 2022-01-01

## 2022-01-01 RX ORDER — DEXAMETHASONE 0.5 MG/5ML
6 ELIXIR ORAL DAILY
Refills: 0 | Status: DISCONTINUED | OUTPATIENT
Start: 2022-01-01 | End: 2022-01-01

## 2022-01-01 RX ORDER — POTASSIUM CHLORIDE 20 MEQ
20 PACKET (EA) ORAL ONCE
Refills: 0 | Status: DISCONTINUED | OUTPATIENT
Start: 2022-01-01 | End: 2022-01-01

## 2022-01-01 RX ORDER — POTASSIUM CHLORIDE 20 MEQ
40 PACKET (EA) ORAL ONCE
Refills: 0 | Status: COMPLETED | OUTPATIENT
Start: 2022-01-01 | End: 2022-01-01

## 2022-01-01 RX ORDER — METOPROLOL TARTRATE 50 MG
25 TABLET ORAL
Refills: 0 | Status: DISCONTINUED | OUTPATIENT
Start: 2022-01-01 | End: 2022-01-01

## 2022-01-01 RX ORDER — TAMSULOSIN HYDROCHLORIDE 0.4 MG/1
0.8 CAPSULE ORAL AT BEDTIME
Refills: 0 | Status: DISCONTINUED | OUTPATIENT
Start: 2022-01-01 | End: 2022-01-01

## 2022-01-01 RX ORDER — ACETAMINOPHEN 500 MG
650 TABLET ORAL EVERY 6 HOURS
Refills: 0 | Status: DISCONTINUED | OUTPATIENT
Start: 2022-01-01 | End: 2022-01-01

## 2022-01-01 RX ORDER — LAMOTRIGINE 25 MG/1
50 TABLET, ORALLY DISINTEGRATING ORAL AT BEDTIME
Refills: 0 | Status: CANCELLED | OUTPATIENT
Start: 2022-01-01 | End: 2022-01-01

## 2022-01-01 RX ORDER — DIAZEPAM 5 MG
5 TABLET ORAL ONCE
Refills: 0 | Status: DISCONTINUED | OUTPATIENT
Start: 2022-01-01 | End: 2022-01-01

## 2022-01-01 RX ORDER — LACTOBACILLUS ACIDOPHILUS 100MM CELL
1 CAPSULE ORAL
Qty: 45 | Refills: 0
Start: 2022-01-01 | End: 2022-01-01

## 2022-01-01 RX ORDER — QUETIAPINE FUMARATE 200 MG/1
25 TABLET, FILM COATED ORAL
Refills: 0 | Status: DISCONTINUED | OUTPATIENT
Start: 2022-01-01 | End: 2022-01-01

## 2022-01-01 RX ORDER — FOLIC ACID 0.8 MG
1 TABLET ORAL
Qty: 0 | Refills: 0 | DISCHARGE

## 2022-01-01 RX ORDER — SODIUM CHLORIDE 9 MG/ML
1000 INJECTION, SOLUTION INTRAVENOUS
Refills: 0 | Status: DISCONTINUED | OUTPATIENT
Start: 2022-01-01 | End: 2022-01-01

## 2022-01-01 RX ORDER — CEFTAZIDIME 6 G/30ML
1 INJECTION, POWDER, FOR SOLUTION INTRAVENOUS EVERY 8 HOURS
Refills: 0 | Status: DISCONTINUED | OUTPATIENT
Start: 2022-01-01 | End: 2022-01-01

## 2022-01-01 RX ORDER — OLANZAPINE 15 MG/1
2.5 TABLET, FILM COATED ORAL EVERY 6 HOURS
Refills: 0 | Status: DISCONTINUED | OUTPATIENT
Start: 2022-01-01 | End: 2022-01-01

## 2022-01-01 RX ORDER — GABAPENTIN 400 MG/1
300 CAPSULE ORAL DAILY
Refills: 0 | Status: DISCONTINUED | OUTPATIENT
Start: 2022-01-01 | End: 2022-01-01

## 2022-01-01 RX ORDER — CHLORHEXIDINE GLUCONATE 213 G/1000ML
1 SOLUTION TOPICAL
Refills: 0 | Status: DISCONTINUED | OUTPATIENT
Start: 2022-01-01 | End: 2022-01-01

## 2022-01-01 RX ORDER — HEPARIN SODIUM 5000 [USP'U]/ML
5000 INJECTION INTRAVENOUS; SUBCUTANEOUS EVERY 8 HOURS
Refills: 0 | Status: DISCONTINUED | OUTPATIENT
Start: 2022-01-01 | End: 2022-01-01

## 2022-01-01 RX ORDER — CEFPODOXIME PROXETIL 100 MG
1 TABLET ORAL
Qty: 10 | Refills: 0
Start: 2022-01-01 | End: 2022-01-01

## 2022-01-01 RX ORDER — POTASSIUM CHLORIDE 20 MEQ
20 PACKET (EA) ORAL
Refills: 0 | Status: COMPLETED | OUTPATIENT
Start: 2022-01-01 | End: 2022-01-01

## 2022-01-01 RX ORDER — LEVETIRACETAM 250 MG/1
2000 TABLET, FILM COATED ORAL ONCE
Refills: 0 | Status: COMPLETED | OUTPATIENT
Start: 2022-01-01 | End: 2022-01-01

## 2022-01-01 RX ORDER — VANCOMYCIN HCL 1 G
1000 VIAL (EA) INTRAVENOUS EVERY 12 HOURS
Refills: 0 | Status: DISCONTINUED | OUTPATIENT
Start: 2022-01-01 | End: 2022-01-01

## 2022-01-01 RX ORDER — LACOSAMIDE 50 MG/1
200 TABLET ORAL
Refills: 0 | Status: DISCONTINUED | OUTPATIENT
Start: 2022-01-01 | End: 2022-01-01

## 2022-01-01 RX ORDER — QUETIAPINE FUMARATE 25 MG/1
25 TABLET ORAL 3 TIMES DAILY
Qty: 90 | Refills: 5 | Status: ACTIVE | COMMUNITY
Start: 2021-01-01 | End: 1900-01-01

## 2022-01-01 RX ORDER — QUETIAPINE FUMARATE 200 MG/1
25 TABLET, FILM COATED ORAL DAILY
Refills: 0 | Status: DISCONTINUED | OUTPATIENT
Start: 2022-01-01 | End: 2022-01-01

## 2022-01-01 RX ORDER — VANCOMYCIN HCL 1 G
125 VIAL (EA) INTRAVENOUS EVERY 12 HOURS
Refills: 0 | Status: DISCONTINUED | OUTPATIENT
Start: 2022-01-01 | End: 2022-01-01

## 2022-01-01 RX ORDER — CEFPODOXIME PROXETIL 100 MG
1 TABLET ORAL
Qty: 14 | Refills: 0
Start: 2022-01-01 | End: 2022-01-01

## 2022-01-01 RX ORDER — CARBAMAZEPINE 200 MG/1
200 TABLET ORAL TWICE DAILY
Qty: 60 | Refills: 5 | Status: ACTIVE | COMMUNITY
Start: 2022-01-01 | End: 1900-01-01

## 2022-01-01 RX ORDER — VANCOMYCIN HCL 1 G
750 VIAL (EA) INTRAVENOUS ONCE
Refills: 0 | Status: COMPLETED | OUTPATIENT
Start: 2022-01-01 | End: 2022-01-01

## 2022-01-01 RX ORDER — LACTOBACILLUS ACIDOPHILUS 100MM CELL
1 CAPSULE ORAL
Refills: 0 | Status: DISCONTINUED | OUTPATIENT
Start: 2022-01-01 | End: 2022-01-01

## 2022-01-01 RX ORDER — CEFTRIAXONE 500 MG/1
1000 INJECTION, POWDER, FOR SOLUTION INTRAMUSCULAR; INTRAVENOUS EVERY 24 HOURS
Refills: 0 | Status: DISCONTINUED | OUTPATIENT
Start: 2022-01-01 | End: 2022-01-01

## 2022-01-01 RX ORDER — CLONAZEPAM 0.5 MG/1
0.5 TABLET ORAL
Qty: 30 | Refills: 5 | Status: ACTIVE | COMMUNITY
Start: 2021-01-01 | End: 1900-01-01

## 2022-01-01 RX ORDER — SENNA PLUS 8.6 MG/1
2 TABLET ORAL AT BEDTIME
Refills: 0 | Status: DISCONTINUED | OUTPATIENT
Start: 2022-01-01 | End: 2022-01-01

## 2022-01-01 RX ORDER — DIVALPROEX SODIUM 250 MG/1
250 TABLET, DELAYED RELEASE ORAL
Qty: 60 | Refills: 5 | Status: DISCONTINUED | COMMUNITY
Start: 2021-01-01 | End: 2022-01-01

## 2022-01-01 RX ORDER — LEVETIRACETAM 250 MG/1
1000 TABLET, FILM COATED ORAL ONCE
Refills: 0 | Status: DISCONTINUED | OUTPATIENT
Start: 2022-01-01 | End: 2022-01-01

## 2022-01-01 RX ORDER — CEFPODOXIME PROXETIL 100 MG
1 TABLET ORAL
Qty: 4 | Refills: 0
Start: 2022-01-01 | End: 2022-01-01

## 2022-01-01 RX ORDER — HEPARIN SODIUM 5000 [USP'U]/ML
5000 INJECTION INTRAVENOUS; SUBCUTANEOUS EVERY 12 HOURS
Refills: 0 | Status: DISCONTINUED | OUTPATIENT
Start: 2022-01-01 | End: 2022-01-01

## 2022-01-01 RX ORDER — LEVETIRACETAM 250 MG/1
500 TABLET, FILM COATED ORAL
Refills: 0 | Status: DISCONTINUED | OUTPATIENT
Start: 2022-01-01 | End: 2022-01-01

## 2022-01-01 RX ORDER — TAMSULOSIN HYDROCHLORIDE 0.4 MG/1
0.4 CAPSULE ORAL AT BEDTIME
Refills: 0 | Status: DISCONTINUED | OUTPATIENT
Start: 2022-01-01 | End: 2022-01-01

## 2022-01-01 RX ORDER — PANTOPRAZOLE SODIUM 20 MG/1
40 TABLET, DELAYED RELEASE ORAL
Refills: 0 | Status: DISCONTINUED | OUTPATIENT
Start: 2022-01-01 | End: 2022-01-01

## 2022-01-01 RX ORDER — METOPROLOL TARTRATE 50 MG
25 TABLET ORAL THREE TIMES A DAY
Refills: 0 | Status: DISCONTINUED | OUTPATIENT
Start: 2022-01-01 | End: 2022-01-01

## 2022-01-01 RX ORDER — GABAPENTIN 400 MG/1
100 CAPSULE ORAL THREE TIMES A DAY
Refills: 0 | Status: DISCONTINUED | OUTPATIENT
Start: 2022-01-01 | End: 2022-01-01

## 2022-01-01 RX ORDER — NIFEDIPINE 30 MG
20 TABLET, EXTENDED RELEASE 24 HR ORAL ONCE
Refills: 0 | Status: COMPLETED | OUTPATIENT
Start: 2022-01-01 | End: 2022-01-01

## 2022-01-01 RX ADMIN — LACOSAMIDE 140 MILLIGRAM(S): 50 TABLET ORAL at 01:06

## 2022-01-01 RX ADMIN — Medication 1 MILLIGRAM(S): at 00:20

## 2022-01-01 RX ADMIN — LACOSAMIDE 120 MILLIGRAM(S): 50 TABLET ORAL at 23:37

## 2022-01-01 RX ADMIN — Medication 81 MILLIGRAM(S): at 13:05

## 2022-01-01 RX ADMIN — LACOSAMIDE 140 MILLIGRAM(S): 50 TABLET ORAL at 18:33

## 2022-01-01 RX ADMIN — QUETIAPINE FUMARATE 25 MILLIGRAM(S): 200 TABLET, FILM COATED ORAL at 17:57

## 2022-01-01 RX ADMIN — HEPARIN SODIUM 5000 UNIT(S): 5000 INJECTION INTRAVENOUS; SUBCUTANEOUS at 18:10

## 2022-01-01 RX ADMIN — TAMSULOSIN HYDROCHLORIDE 0.8 MILLIGRAM(S): 0.4 CAPSULE ORAL at 21:23

## 2022-01-01 RX ADMIN — Medication 25 MILLIGRAM(S): at 12:48

## 2022-01-01 RX ADMIN — Medication 25 MILLIGRAM(S): at 22:23

## 2022-01-01 RX ADMIN — HEPARIN SODIUM 5000 UNIT(S): 5000 INJECTION INTRAVENOUS; SUBCUTANEOUS at 13:15

## 2022-01-01 RX ADMIN — CHLORHEXIDINE GLUCONATE 1 APPLICATION(S): 213 SOLUTION TOPICAL at 06:07

## 2022-01-01 RX ADMIN — QUETIAPINE FUMARATE 25 MILLIGRAM(S): 200 TABLET, FILM COATED ORAL at 11:31

## 2022-01-01 RX ADMIN — CEFTAZIDIME 100 GRAM(S): 6 INJECTION, POWDER, FOR SOLUTION INTRAVENOUS at 05:35

## 2022-01-01 RX ADMIN — HEPARIN SODIUM 5000 UNIT(S): 5000 INJECTION INTRAVENOUS; SUBCUTANEOUS at 14:27

## 2022-01-01 RX ADMIN — CEFTAZIDIME 100 GRAM(S): 6 INJECTION, POWDER, FOR SOLUTION INTRAVENOUS at 05:23

## 2022-01-01 RX ADMIN — LACOSAMIDE 120 MILLIGRAM(S): 50 TABLET ORAL at 00:34

## 2022-01-01 RX ADMIN — QUETIAPINE FUMARATE 25 MILLIGRAM(S): 200 TABLET, FILM COATED ORAL at 05:45

## 2022-01-01 RX ADMIN — CHLORHEXIDINE GLUCONATE 1 APPLICATION(S): 213 SOLUTION TOPICAL at 05:34

## 2022-01-01 RX ADMIN — HEPARIN SODIUM 5000 UNIT(S): 5000 INJECTION INTRAVENOUS; SUBCUTANEOUS at 05:24

## 2022-01-01 RX ADMIN — LACOSAMIDE 140 MILLIGRAM(S): 50 TABLET ORAL at 05:17

## 2022-01-01 RX ADMIN — CEFTRIAXONE 100 MILLIGRAM(S): 500 INJECTION, POWDER, FOR SOLUTION INTRAMUSCULAR; INTRAVENOUS at 14:29

## 2022-01-01 RX ADMIN — HEPARIN SODIUM 5000 UNIT(S): 5000 INJECTION INTRAVENOUS; SUBCUTANEOUS at 05:07

## 2022-01-01 RX ADMIN — LACOSAMIDE 140 MILLIGRAM(S): 50 TABLET ORAL at 05:11

## 2022-01-01 RX ADMIN — Medication 1 MILLIGRAM(S): at 20:30

## 2022-01-01 RX ADMIN — SENNA PLUS 2 TABLET(S): 8.6 TABLET ORAL at 21:04

## 2022-01-01 RX ADMIN — QUETIAPINE FUMARATE 25 MILLIGRAM(S): 200 TABLET, FILM COATED ORAL at 11:47

## 2022-01-01 RX ADMIN — LACOSAMIDE 130 MILLIGRAM(S): 50 TABLET ORAL at 18:24

## 2022-01-01 RX ADMIN — QUETIAPINE FUMARATE 25 MILLIGRAM(S): 200 TABLET, FILM COATED ORAL at 11:43

## 2022-01-01 RX ADMIN — CEFTAZIDIME 100 GRAM(S): 6 INJECTION, POWDER, FOR SOLUTION INTRAVENOUS at 22:17

## 2022-01-01 RX ADMIN — LEVETIRACETAM 500 MILLIGRAM(S): 250 TABLET, FILM COATED ORAL at 05:23

## 2022-01-01 RX ADMIN — Medication 650 MILLIGRAM(S): at 21:05

## 2022-01-01 RX ADMIN — QUETIAPINE FUMARATE 100 MILLIGRAM(S): 200 TABLET, FILM COATED ORAL at 22:11

## 2022-01-01 RX ADMIN — Medication 2 MILLIGRAM(S): at 12:08

## 2022-01-01 RX ADMIN — LACOSAMIDE 130 MILLIGRAM(S): 50 TABLET ORAL at 16:03

## 2022-01-01 RX ADMIN — CEFTRIAXONE 100 MILLIGRAM(S): 500 INJECTION, POWDER, FOR SOLUTION INTRAMUSCULAR; INTRAVENOUS at 12:34

## 2022-01-01 RX ADMIN — Medication 6 MILLIGRAM(S): at 05:40

## 2022-01-01 RX ADMIN — Medication 6 MILLIGRAM(S): at 23:21

## 2022-01-01 RX ADMIN — Medication 25 MILLIGRAM(S): at 05:12

## 2022-01-01 RX ADMIN — Medication 2 MILLIGRAM(S): at 02:00

## 2022-01-01 RX ADMIN — CEFTAZIDIME 100 GRAM(S): 6 INJECTION, POWDER, FOR SOLUTION INTRAVENOUS at 06:23

## 2022-01-01 RX ADMIN — Medication 125 MILLIGRAM(S): at 17:57

## 2022-01-01 RX ADMIN — LACOSAMIDE 140 MILLIGRAM(S): 50 TABLET ORAL at 05:26

## 2022-01-01 RX ADMIN — Medication 81 MILLIGRAM(S): at 12:02

## 2022-01-01 RX ADMIN — Medication 25 MILLIGRAM(S): at 05:27

## 2022-01-01 RX ADMIN — Medication 125 MILLIGRAM(S): at 17:14

## 2022-01-01 RX ADMIN — SODIUM CHLORIDE 75 MILLILITER(S): 9 INJECTION, SOLUTION INTRAVENOUS at 23:04

## 2022-01-01 RX ADMIN — Medication 2 MILLIGRAM(S): at 13:23

## 2022-01-01 RX ADMIN — CEFTRIAXONE 1000 MILLIGRAM(S): 500 INJECTION, POWDER, FOR SOLUTION INTRAMUSCULAR; INTRAVENOUS at 13:53

## 2022-01-01 RX ADMIN — Medication 25 GRAM(S): at 09:35

## 2022-01-01 RX ADMIN — LEVETIRACETAM 2000 MILLIGRAM(S): 250 TABLET, FILM COATED ORAL at 13:37

## 2022-01-01 RX ADMIN — Medication 125 MILLIGRAM(S): at 17:40

## 2022-01-01 RX ADMIN — CEFTRIAXONE 100 MILLIGRAM(S): 500 INJECTION, POWDER, FOR SOLUTION INTRAMUSCULAR; INTRAVENOUS at 11:42

## 2022-01-01 RX ADMIN — Medication 20 MILLIEQUIVALENT(S): at 11:13

## 2022-01-01 RX ADMIN — TAMSULOSIN HYDROCHLORIDE 0.4 MILLIGRAM(S): 0.4 CAPSULE ORAL at 21:44

## 2022-01-01 RX ADMIN — HEPARIN SODIUM 5000 UNIT(S): 5000 INJECTION INTRAVENOUS; SUBCUTANEOUS at 05:26

## 2022-01-01 RX ADMIN — QUETIAPINE FUMARATE 100 MILLIGRAM(S): 200 TABLET, FILM COATED ORAL at 21:22

## 2022-01-01 RX ADMIN — Medication 25 GRAM(S): at 21:30

## 2022-01-01 RX ADMIN — LACOSAMIDE 115 MILLIGRAM(S): 50 TABLET ORAL at 06:21

## 2022-01-01 RX ADMIN — Medication 81 MILLIGRAM(S): at 12:48

## 2022-01-01 RX ADMIN — HEPARIN SODIUM 5000 UNIT(S): 5000 INJECTION INTRAVENOUS; SUBCUTANEOUS at 17:47

## 2022-01-01 RX ADMIN — Medication 25 MILLIGRAM(S): at 17:14

## 2022-01-01 RX ADMIN — LACOSAMIDE 130 MILLIGRAM(S): 50 TABLET ORAL at 06:23

## 2022-01-01 RX ADMIN — CEFTRIAXONE 1000 MILLIGRAM(S): 500 INJECTION, POWDER, FOR SOLUTION INTRAMUSCULAR; INTRAVENOUS at 12:34

## 2022-01-01 RX ADMIN — Medication 81 MILLIGRAM(S): at 11:31

## 2022-01-01 RX ADMIN — Medication 1 MILLIGRAM(S): at 00:12

## 2022-01-01 RX ADMIN — Medication 2 MILLIGRAM(S): at 00:10

## 2022-01-01 RX ADMIN — QUETIAPINE FUMARATE 25 MILLIGRAM(S): 200 TABLET, FILM COATED ORAL at 18:11

## 2022-01-01 RX ADMIN — HEPARIN SODIUM 5000 UNIT(S): 5000 INJECTION INTRAVENOUS; SUBCUTANEOUS at 18:16

## 2022-01-01 RX ADMIN — ATORVASTATIN CALCIUM 40 MILLIGRAM(S): 80 TABLET, FILM COATED ORAL at 21:22

## 2022-01-01 RX ADMIN — SODIUM CHLORIDE 50 MILLILITER(S): 9 INJECTION INTRAMUSCULAR; INTRAVENOUS; SUBCUTANEOUS at 20:22

## 2022-01-01 RX ADMIN — HEPARIN SODIUM 5000 UNIT(S): 5000 INJECTION INTRAVENOUS; SUBCUTANEOUS at 22:00

## 2022-01-01 RX ADMIN — HEPARIN SODIUM 5000 UNIT(S): 5000 INJECTION INTRAVENOUS; SUBCUTANEOUS at 22:09

## 2022-01-01 RX ADMIN — Medication 1 TABLET(S): at 11:31

## 2022-01-01 RX ADMIN — Medication 975 MILLIGRAM(S): at 13:23

## 2022-01-01 RX ADMIN — CEFTRIAXONE 100 MILLIGRAM(S): 500 INJECTION, POWDER, FOR SOLUTION INTRAMUSCULAR; INTRAVENOUS at 15:57

## 2022-01-01 RX ADMIN — HEPARIN SODIUM 5000 UNIT(S): 5000 INJECTION INTRAVENOUS; SUBCUTANEOUS at 06:58

## 2022-01-01 RX ADMIN — TAMSULOSIN HYDROCHLORIDE 0.4 MILLIGRAM(S): 0.4 CAPSULE ORAL at 22:23

## 2022-01-01 RX ADMIN — LACOSAMIDE 115 MILLIGRAM(S): 50 TABLET ORAL at 17:00

## 2022-01-01 RX ADMIN — HEPARIN SODIUM 5000 UNIT(S): 5000 INJECTION INTRAVENOUS; SUBCUTANEOUS at 06:23

## 2022-01-01 RX ADMIN — LEVETIRACETAM 500 MILLIGRAM(S): 250 TABLET, FILM COATED ORAL at 21:08

## 2022-01-01 RX ADMIN — QUETIAPINE FUMARATE 25 MILLIGRAM(S): 200 TABLET, FILM COATED ORAL at 05:27

## 2022-01-01 RX ADMIN — Medication 125 MILLIGRAM(S): at 18:11

## 2022-01-01 RX ADMIN — Medication 1 TABLET(S): at 11:47

## 2022-01-01 RX ADMIN — HEPARIN SODIUM 5000 UNIT(S): 5000 INJECTION INTRAVENOUS; SUBCUTANEOUS at 14:34

## 2022-01-01 RX ADMIN — CEFTRIAXONE 100 MILLIGRAM(S): 500 INJECTION, POWDER, FOR SOLUTION INTRAMUSCULAR; INTRAVENOUS at 14:25

## 2022-01-01 RX ADMIN — LACOSAMIDE 200 MILLIGRAM(S): 50 TABLET ORAL at 17:40

## 2022-01-01 RX ADMIN — ATORVASTATIN CALCIUM 40 MILLIGRAM(S): 80 TABLET, FILM COATED ORAL at 22:23

## 2022-01-01 RX ADMIN — CEFTRIAXONE 100 MILLIGRAM(S): 500 INJECTION, POWDER, FOR SOLUTION INTRAMUSCULAR; INTRAVENOUS at 18:07

## 2022-01-01 RX ADMIN — Medication 25 MILLIGRAM(S): at 17:47

## 2022-01-01 RX ADMIN — CEFTAZIDIME 100 GRAM(S): 6 INJECTION, POWDER, FOR SOLUTION INTRAVENOUS at 14:31

## 2022-01-01 RX ADMIN — HEPARIN SODIUM 5000 UNIT(S): 5000 INJECTION INTRAVENOUS; SUBCUTANEOUS at 05:44

## 2022-01-01 RX ADMIN — Medication 20 MILLIGRAM(S): at 10:35

## 2022-01-01 RX ADMIN — Medication 1 TABLET(S): at 13:06

## 2022-01-01 RX ADMIN — HEPARIN SODIUM 5000 UNIT(S): 5000 INJECTION INTRAVENOUS; SUBCUTANEOUS at 17:57

## 2022-01-01 RX ADMIN — Medication 40 MILLIEQUIVALENT(S): at 09:36

## 2022-01-01 RX ADMIN — PANTOPRAZOLE SODIUM 40 MILLIGRAM(S): 20 TABLET, DELAYED RELEASE ORAL at 12:17

## 2022-01-01 RX ADMIN — Medication 125 MILLIGRAM(S): at 05:10

## 2022-01-01 RX ADMIN — Medication 125 MILLIGRAM(S): at 05:25

## 2022-01-01 RX ADMIN — LACOSAMIDE 140 MILLIGRAM(S): 50 TABLET ORAL at 17:48

## 2022-01-01 RX ADMIN — LACOSAMIDE 130 MILLIGRAM(S): 50 TABLET ORAL at 21:23

## 2022-01-01 RX ADMIN — LACOSAMIDE 140 MILLIGRAM(S): 50 TABLET ORAL at 17:58

## 2022-01-01 RX ADMIN — Medication 25 MILLIGRAM(S): at 05:07

## 2022-01-01 RX ADMIN — PANTOPRAZOLE SODIUM 40 MILLIGRAM(S): 20 TABLET, DELAYED RELEASE ORAL at 11:41

## 2022-01-01 RX ADMIN — SODIUM CHLORIDE 50 MILLILITER(S): 9 INJECTION INTRAMUSCULAR; INTRAVENOUS; SUBCUTANEOUS at 09:49

## 2022-01-01 RX ADMIN — TAMSULOSIN HYDROCHLORIDE 0.4 MILLIGRAM(S): 0.4 CAPSULE ORAL at 21:08

## 2022-01-01 RX ADMIN — Medication 1 TABLET(S): at 12:48

## 2022-01-01 RX ADMIN — CHLORHEXIDINE GLUCONATE 1 APPLICATION(S): 213 SOLUTION TOPICAL at 05:19

## 2022-01-01 RX ADMIN — HEPARIN SODIUM 5000 UNIT(S): 5000 INJECTION INTRAVENOUS; SUBCUTANEOUS at 17:15

## 2022-01-01 RX ADMIN — HEPARIN SODIUM 5000 UNIT(S): 5000 INJECTION INTRAVENOUS; SUBCUTANEOUS at 17:16

## 2022-01-01 RX ADMIN — SODIUM CHLORIDE 75 MILLILITER(S): 9 INJECTION, SOLUTION INTRAVENOUS at 06:21

## 2022-01-01 RX ADMIN — Medication 250 MILLIGRAM(S): at 06:23

## 2022-01-01 RX ADMIN — Medication 125 MILLIGRAM(S): at 05:18

## 2022-01-01 RX ADMIN — Medication 25 MILLIGRAM(S): at 17:57

## 2022-01-01 RX ADMIN — HEPARIN SODIUM 5000 UNIT(S): 5000 INJECTION INTRAVENOUS; SUBCUTANEOUS at 18:25

## 2022-01-01 RX ADMIN — CEFTRIAXONE 100 MILLIGRAM(S): 500 INJECTION, POWDER, FOR SOLUTION INTRAMUSCULAR; INTRAVENOUS at 16:02

## 2022-01-01 RX ADMIN — QUETIAPINE FUMARATE 100 MILLIGRAM(S): 200 TABLET, FILM COATED ORAL at 21:42

## 2022-01-01 RX ADMIN — Medication 1 MILLIGRAM(S): at 03:26

## 2022-01-01 RX ADMIN — Medication 20 MILLIEQUIVALENT(S): at 11:12

## 2022-01-01 RX ADMIN — HEPARIN SODIUM 5000 UNIT(S): 5000 INJECTION INTRAVENOUS; SUBCUTANEOUS at 05:18

## 2022-01-01 RX ADMIN — CEFTAZIDIME 100 GRAM(S): 6 INJECTION, POWDER, FOR SOLUTION INTRAVENOUS at 21:58

## 2022-01-01 RX ADMIN — Medication 6 MILLIGRAM(S): at 06:21

## 2022-01-01 RX ADMIN — CEFTAZIDIME 100 GRAM(S): 6 INJECTION, POWDER, FOR SOLUTION INTRAVENOUS at 14:27

## 2022-01-01 RX ADMIN — HEPARIN SODIUM 5000 UNIT(S): 5000 INJECTION INTRAVENOUS; SUBCUTANEOUS at 05:37

## 2022-01-01 RX ADMIN — LAMOTRIGINE 12.5 MILLIGRAM(S): 25 TABLET, ORALLY DISINTEGRATING ORAL at 13:06

## 2022-01-01 RX ADMIN — HEPARIN SODIUM 5000 UNIT(S): 5000 INJECTION INTRAVENOUS; SUBCUTANEOUS at 04:56

## 2022-01-01 RX ADMIN — Medication 50 MILLIEQUIVALENT(S): at 09:35

## 2022-01-01 RX ADMIN — HEPARIN SODIUM 5000 UNIT(S): 5000 INJECTION INTRAVENOUS; SUBCUTANEOUS at 05:41

## 2022-01-01 RX ADMIN — CHLORHEXIDINE GLUCONATE 1 APPLICATION(S): 213 SOLUTION TOPICAL at 05:46

## 2022-01-01 RX ADMIN — HEPARIN SODIUM 5000 UNIT(S): 5000 INJECTION INTRAVENOUS; SUBCUTANEOUS at 14:11

## 2022-01-01 RX ADMIN — LACOSAMIDE 130 MILLIGRAM(S): 50 TABLET ORAL at 18:16

## 2022-01-01 RX ADMIN — SENNA PLUS 2 TABLET(S): 8.6 TABLET ORAL at 21:22

## 2022-01-01 RX ADMIN — Medication 1 TABLET(S): at 12:02

## 2022-01-01 RX ADMIN — LACOSAMIDE 120 MILLIGRAM(S): 50 TABLET ORAL at 06:58

## 2022-01-01 RX ADMIN — Medication 1 TABLET(S): at 12:17

## 2022-01-01 RX ADMIN — Medication 1 TABLET(S): at 10:18

## 2022-01-01 RX ADMIN — LACOSAMIDE 140 MILLIGRAM(S): 50 TABLET ORAL at 18:10

## 2022-01-01 RX ADMIN — QUETIAPINE FUMARATE 25 MILLIGRAM(S): 200 TABLET, FILM COATED ORAL at 05:06

## 2022-01-01 RX ADMIN — CHLORHEXIDINE GLUCONATE 1 APPLICATION(S): 213 SOLUTION TOPICAL at 05:08

## 2022-01-01 RX ADMIN — LACOSAMIDE 130 MILLIGRAM(S): 50 TABLET ORAL at 05:37

## 2022-01-01 RX ADMIN — LACOSAMIDE 140 MILLIGRAM(S): 50 TABLET ORAL at 06:22

## 2022-01-01 RX ADMIN — Medication 6 MILLIGRAM(S): at 04:56

## 2022-01-01 RX ADMIN — CEFTAZIDIME 100 GRAM(S): 6 INJECTION, POWDER, FOR SOLUTION INTRAVENOUS at 21:08

## 2022-01-01 RX ADMIN — HEPARIN SODIUM 5000 UNIT(S): 5000 INJECTION INTRAVENOUS; SUBCUTANEOUS at 23:02

## 2022-01-01 RX ADMIN — Medication 25 GRAM(S): at 12:41

## 2022-01-01 RX ADMIN — Medication 25 MILLIGRAM(S): at 05:45

## 2022-01-01 RX ADMIN — SENNA PLUS 2 TABLET(S): 8.6 TABLET ORAL at 21:08

## 2022-01-01 RX ADMIN — LACOSAMIDE 110 MILLIGRAM(S): 50 TABLET ORAL at 00:17

## 2022-01-01 RX ADMIN — CEFTRIAXONE 100 MILLIGRAM(S): 500 INJECTION, POWDER, FOR SOLUTION INTRAMUSCULAR; INTRAVENOUS at 13:23

## 2022-01-01 RX ADMIN — Medication 125 MILLIGRAM(S): at 17:47

## 2022-01-01 RX ADMIN — Medication 1 TABLET(S): at 17:00

## 2022-01-01 RX ADMIN — ATORVASTATIN CALCIUM 40 MILLIGRAM(S): 80 TABLET, FILM COATED ORAL at 21:43

## 2022-01-01 RX ADMIN — Medication 25 MILLIGRAM(S): at 21:22

## 2022-01-01 RX ADMIN — SENNA PLUS 2 TABLET(S): 8.6 TABLET ORAL at 21:06

## 2022-01-01 RX ADMIN — Medication 125 MILLIGRAM(S): at 05:11

## 2022-01-01 RX ADMIN — Medication 975 MILLIGRAM(S): at 14:03

## 2022-01-01 RX ADMIN — CEFTRIAXONE 100 MILLIGRAM(S): 500 INJECTION, POWDER, FOR SOLUTION INTRAMUSCULAR; INTRAVENOUS at 17:18

## 2022-01-01 RX ADMIN — Medication 125 MILLIGRAM(S): at 17:16

## 2022-01-01 RX ADMIN — Medication 125 MILLIGRAM(S): at 05:45

## 2022-01-01 RX ADMIN — CHLORHEXIDINE GLUCONATE 1 APPLICATION(S): 213 SOLUTION TOPICAL at 06:00

## 2022-01-01 RX ADMIN — HEPARIN SODIUM 5000 UNIT(S): 5000 INJECTION INTRAVENOUS; SUBCUTANEOUS at 21:23

## 2022-01-01 RX ADMIN — LACOSAMIDE 140 MILLIGRAM(S): 50 TABLET ORAL at 05:34

## 2022-01-01 RX ADMIN — QUETIAPINE FUMARATE 25 MILLIGRAM(S): 200 TABLET, FILM COATED ORAL at 17:21

## 2022-01-01 RX ADMIN — CEFTRIAXONE 100 MILLIGRAM(S): 500 INJECTION, POWDER, FOR SOLUTION INTRAMUSCULAR; INTRAVENOUS at 15:52

## 2022-01-01 RX ADMIN — Medication 250 MILLIGRAM(S): at 23:00

## 2022-01-01 RX ADMIN — Medication 5 MILLIGRAM(S): at 18:55

## 2022-01-01 RX ADMIN — ATORVASTATIN CALCIUM 40 MILLIGRAM(S): 80 TABLET, FILM COATED ORAL at 22:11

## 2022-01-01 RX ADMIN — Medication 25 MILLIGRAM(S): at 21:43

## 2022-01-01 RX ADMIN — TAMSULOSIN HYDROCHLORIDE 0.4 MILLIGRAM(S): 0.4 CAPSULE ORAL at 21:35

## 2022-01-01 RX ADMIN — HEPARIN SODIUM 5000 UNIT(S): 5000 INJECTION INTRAVENOUS; SUBCUTANEOUS at 17:41

## 2022-01-01 RX ADMIN — LEVETIRACETAM 1000 MILLIGRAM(S): 250 TABLET, FILM COATED ORAL at 13:22

## 2022-01-01 RX ADMIN — CEFTRIAXONE 100 MILLIGRAM(S): 500 INJECTION, POWDER, FOR SOLUTION INTRAMUSCULAR; INTRAVENOUS at 15:55

## 2022-01-01 RX ADMIN — Medication 25 MILLIGRAM(S): at 13:07

## 2022-01-01 RX ADMIN — SODIUM CHLORIDE 166.67 MILLILITER(S): 9 INJECTION INTRAMUSCULAR; INTRAVENOUS; SUBCUTANEOUS at 09:50

## 2022-01-01 RX ADMIN — TAMSULOSIN HYDROCHLORIDE 0.4 MILLIGRAM(S): 0.4 CAPSULE ORAL at 22:11

## 2022-01-01 RX ADMIN — SENNA PLUS 2 TABLET(S): 8.6 TABLET ORAL at 21:43

## 2022-01-01 RX ADMIN — PANTOPRAZOLE SODIUM 40 MILLIGRAM(S): 20 TABLET, DELAYED RELEASE ORAL at 12:32

## 2022-01-01 RX ADMIN — HEPARIN SODIUM 5000 UNIT(S): 5000 INJECTION INTRAVENOUS; SUBCUTANEOUS at 05:25

## 2022-01-01 RX ADMIN — LEVETIRACETAM 500 MILLIGRAM(S): 250 TABLET, FILM COATED ORAL at 17:16

## 2022-01-01 RX ADMIN — LACOSAMIDE 200 MILLIGRAM(S): 50 TABLET ORAL at 05:48

## 2022-01-01 RX ADMIN — LACOSAMIDE 140 MILLIGRAM(S): 50 TABLET ORAL at 18:02

## 2022-01-01 RX ADMIN — HEPARIN SODIUM 5000 UNIT(S): 5000 INJECTION INTRAVENOUS; SUBCUTANEOUS at 05:14

## 2022-01-01 RX ADMIN — LACOSAMIDE 120 MILLIGRAM(S): 50 TABLET ORAL at 13:03

## 2022-01-01 RX ADMIN — HEPARIN SODIUM 5000 UNIT(S): 5000 INJECTION INTRAVENOUS; SUBCUTANEOUS at 06:21

## 2022-01-01 RX ADMIN — LACOSAMIDE 140 MILLIGRAM(S): 50 TABLET ORAL at 05:05

## 2022-01-01 RX ADMIN — Medication 125 MILLIGRAM(S): at 05:27

## 2022-01-01 RX ADMIN — QUETIAPINE FUMARATE 100 MILLIGRAM(S): 200 TABLET, FILM COATED ORAL at 21:35

## 2022-01-10 NOTE — CHART NOTE - NSCHARTNOTEFT_GEN_A_CORE
Patient was admitted with acute change in her status which was not present prior to admission.  After thorough workup in the hospital it was determined that she did not have an acute stroke and the likely cause for her acute change was a combination of seizures and toxic/metabolic encephalopathy due too urinary tract infection

## 2022-01-14 PROBLEM — N39.0 UTI (URINARY TRACT INFECTION): Status: ACTIVE | Noted: 2021-01-22

## 2022-01-14 PROBLEM — F03.90 DEMENTIA: Status: ACTIVE | Noted: 2020-11-23

## 2022-01-14 PROBLEM — G40.909 SEIZURE DISORDER: Status: ACTIVE | Noted: 2021-01-01

## 2022-01-14 NOTE — PLAN
[FreeTextEntry1] : 76 year old woman with history of hypertension, TIA, CVA and CAD is here for follow up visit. Her diagnosis is most likely AD versus vascular dementia with behavioral symptoms. Noted to have epileptiform discharged on VEEG when she was admitted to hospital. Developing side effect of significant tremor to Depakote and has drowsiness to Keppra. \par \par # AD/vascular dementia \par - cw Seroquel to 100 mg qhs and 25 mg TID PRN \par -cw Lexapro 10 mg \par \par \par # Vitamin B12 deficiency \par -Currently on PO supplement. \par - Continue folic acid \par \par #TIA/CVA \par - Continue ASA and Lipitor \par \par # History of unresponsiveness \par - Seizure potentials on VEEG\par - Stop Depakote \par - Start Vimpat 50 mg BID \par \par #Recurrent UTI: \par - Check UA and UC \par \par RTC in 6 months \par . \par

## 2022-01-14 NOTE — HISTORY OF PRESENT ILLNESS
[Home] : at home, [unfilled] , at the time of the visit. [Other Location: e.g. Home (Enter Location, City,State)___] : at [unfilled] [Family Member] : family member [Verbal consent obtained from patient] : the patient, [unfilled] [Time Spent: ___ minutes] : I have spent [unfilled] minutes with the patient on the telephone [FreeTextEntry1] : DANISHA JIMÉNEZ is a 76 year old woman is being followed via telehealth visit for dementia and seizure. She was last seen on Oct 2021. Due to worsening agitation Seroquel dose increased to 100 mg QHS and 25 mg TID as needed. She also takes Clonazepam 0.5 mg qhs and is on Lexapro 10 mg. \par  She was admitted to [facility] for worsening confusion and agitation.R EEG showed mid to post sharp transient spike and VEEG showed rare right posterior temporal/parietal sharps that appear epileptiform in nature. She was started on Depakote 250 mg BID.  Son stated that she is having side effect of tremor and drowsiness on Depakote 250 mg BID. She was on Keppra 500 mg BID and had side effect of drowsiness and somnolence on Keppra. \par Since last visit she was admitted to [facility] for acute encephalopathy. Brain MRI showed acute infarct and she was noted to have UTI. \par

## 2022-01-18 NOTE — ED PROVIDER NOTE - OBJECTIVE STATEMENT
76 year old female with pmhx of dementia, cva with left sided weakness and facial droop, seizure, htn, and hld presents with seizure at 5:20 pm. as per patients son, was weaned off her depakote and keppra, and took her first dose of vimpat today. as per son noted she started shaking , ems arrived and gave her 5 mg of valium. pt is on verbal.

## 2022-01-18 NOTE — ED PROVIDER NOTE - ATTENDING CONTRIBUTION TO CARE
75 yo female with PMH of Alzheimer's dementia, CVA with left sided weakness and left facial droop, Left AKA, seizure d/o, UTI, HTN, HLD presents to the ER for grand mal seizure around 5:20 pm. Pt non verbal currently and history from son on phone. Pt weaned off her depakote, and keppra completely discontinued, and today was first day she took new medicine Vimpat. Pt had been in her USOH prior to seizure, verbal and interactive. Family denies any fever/cough/cp/sob/belly pain/n/v/d/dysuria complaints. Son states pt started to stare off to her left, kept repeating herself, left arm appeared to stiffen, and then she started to have total body shaking. Pt has never had a grand mal seizure before per family.  Continued until EMS came and gave her Valium which stopped her seizure. Here in ER pt nonverbal, left gaze preference, not really moving left side of body (which son states is how she is after an acute seizure). Last admission for seizure/stroke workup on Thanksgiving 2021 (subsequent CT head and MRI head showed only white matter ischemic changes but no large stroke/bleed). Remainider of Exam as per PA note. To order labs, ekg, xray, CT head, neuro consult, and admit.     ALL: adhesives  PMH as above  Meds Metoprolol, amlodipine, atorvastatin, quetiapine, ASA, folic acid, vimpat 50 bid (dc keppra and divalproex)  SH denies

## 2022-01-18 NOTE — ED ADULT TRIAGE NOTE - CHIEF COMPLAINT QUOTE
Patient had a witnessed grand mal seizure that lasted 15 minutes, 5mg Valium was given as per EMS- patient back to baseline mental status. Recently changed from Keppra to Vimpat.

## 2022-01-18 NOTE — CONSULT NOTE ADULT - ASSESSMENT
Assessment:75 yo female with PMH of Alzheimer's dementia, 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 EMS gave the patient 5 mg Valium. In the ED patient was noted to have seizure like activity given another 5 mg Valium. At bedside when examined patient was shaking her right arm and jerky body, given 1 mg of ativan.     Suggestions:  Admit to medicine  Toxic metabolic work up, patient was recently admitted for UTI  Stat Vimpat 100 mg IV  Vimpat 100 mg q12, one at bedtime tonight.   UA  vEEG  Keep Mg>2  if GTC >2 minutes, give 1mg ativan  Seizure precautions   Fall precautions  Discussed with Dr Najera. Pending note to follow.  Assessment:75 yo female with PMH of Alzheimer's dementia, 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 EMS gave the patient 5 mg Valium. In the ED patient was noted to have seizure like activity given another 5 mg Valium. At bedside when examined patient was shaking her right arm and jerky body, given 1 mg of ativan.     Suggestions:  Admit to medicine  Toxic metabolic work up, patient was recently admitted for UTI  Stat Vimpat 100 mg IV  Vimpat 100 mg q12, one at bedtime tonight.   UA  vEEG  Keep Mg>2  if GTC >2 minutes, give 1mg ativan  Seizure precautions   Fall precautions  q4 neurochecks  Discussed with Dr Najera. Pending note to follow.  Assessment:77 yo female with PMH of Alzheimer's dementia, 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  Patient was on Keppra (discontinued due to drowsiness), then on Depakote(discontinued due to tremors) and started on Vimpat 50 mg BID which was started for the first time on 1/18/22. EMS gave the patient 5 mg Valium. In the ED patient was noted to have seizure like activity given another 5 mg Valium. At bedside when examined patient was shaking her right arm and jerky body, given 1 mg of ativan.     Suggestions:  Admit to medicine  Toxic metabolic work up, patient was recently admitted for UTI  Stat Vimpat 100 mg IV  Vimpat 100 mg q12, one at bedtime tonight.   UA  vEEG  Keep Mg>2  if GTC >2 minutes, give 1mg ativan  Seizure precautions   Fall precautions  q4 neurochecks  Discussed with Dr Najera. Pending note to follow.

## 2022-01-18 NOTE — ED PROVIDER NOTE - CLINICAL SUMMARY MEDICAL DECISION MAKING FREE TEXT BOX
77 yo female with PMH of Alzheimer's dementia, CVA with left sided weakness and left facial droop, Left AKA, seizure d/o, UTI, HTN, HLD presents to the ER for grand mal seizure around 5:20 pm. Pt non verbal currently and history from son on phone. Pt weaned off her depakote, and keppra completely discontinued, and today was first day she took new medicine Vimpat. Pt had been in her USOH prior to seizure, verbal and interactive. Family denies any fever/cough/cp/sob/belly pain/n/v/d/dysuria complaints. Son states pt started to stare off to her left, kept repeating herself, left arm appeared to stiffen, and then she started to have total body shaking. Pt has never had a grand mal seizure before per family.  Continued until EMS came and gave her Valium which stopped her seizure. Here in ER pt nonverbal, left gaze preference, not really moving left side of body (which son states is how she is after an acute seizure). Last admission for seizure/stroke workup on Thanksgiving 2021 (subsequent CT head and MRI head showed only white matter ischemic changes but no large stroke/bleed). Remainider of Exam as per PA note. EKG showed prolonged QTc and magnesium slightly low therefore replaced IV, remainder of labs ok, CT head and XR with no acute disease. Neuro consulted (see separate consult). To admit for further workup
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
## 2022-01-18 NOTE — ED PROVIDER NOTE - PHYSICAL EXAMINATION
CONST: Well appearing in NAD  EYES: PERRL, EOMI, Sclera and conjunctiva clear.   ENT: No nasal discharge. Oropharynx normal appearing  NECK: Non-tender, no meningeal signs. normal ROM. supple   CARD: Normal S1 S2; Normal rate and rhythm  RESP: Equal BS B/L, No wheezes, rhonchi or rales. No distress  GI: Soft, non-tender, non-distended. no cva tenderness. normal BS  MS: Normal ROM in all extremities. No midline spinal tenderness. pulses 2 +. no calf tenderness or swelling  SKIN: Warm, dry, no acute rashes. Good turgor  NEURO: Alert, left sided gaze, opens eye to voice, withdraws to pain, nonverbal, + Left sided weakness (chronic)

## 2022-01-18 NOTE — ED ADULT NURSE NOTE - INTERVENTIONS DEFINITIONS
Call bell, personal items and telephone within reach/Instruct patient to call for assistance/Non-slip footwear when patient is off stretcher/Stretcher in lowest position, wheels locked, appropriate side rails in place

## 2022-01-18 NOTE — ED ADULT NURSE NOTE - OBJECTIVE STATEMENT
As per granddaughter Pt had an episode of seizures, called to 911, Valium given by EMS. Pt was recently switch from Keppra to Vimpat. Pt is alert, doesn't answering questions, and following commands. Pt is on a fall precautions and CCM. As per granddaughter Pt had an episode of seizures, called to 911, Valium given by EMS. Pt was recently switch from Keppra to Vimpat. As per son (caregiver) Pt has residual left sided upper and lower extremity weakness with left sided facial droop. Also stated Pt is normaly verbal at a base line. Pt is alert, doesn't answering questions, and following commands. Pt is on a fall precautions and CCM.
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
## 2022-01-18 NOTE — H&P ADULT - NSHPPHYSICALEXAM_GEN_ALL_CORE
GEN: drowsy, grimace to pain only  HEENT: NCAT, PERRL, No nystagmus,   CV/CHEST: Regular rhythm, tachy rate, +S1/S2, no murmurs  PULM: LLL rhonchi, good air entry iblat  ABD: SNTTP, ND x 4 Q's  EXT: Warm, Well Perfused x 3. No Edema. LLE s/p amputation  NEURO: Grimace to pain only

## 2022-01-18 NOTE — CONSULT NOTE ADULT - SUBJECTIVE AND OBJECTIVE BOX
Neurology Consult Note:    DANISHA JIMÉNEZ    1. 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 EMS gave the patient 5 mg Valium. In the ED patient was noted to have seizure like activity given another 5 mg Valium. At bedside when examined patient was shaking her right arm and jerky body, given 1 mg of ativan.        Relevant PMH:   Prior ischemic stroke/TIA[ ], Afib [ ], CAD [ ], HTN [ ], DLD [ ], DM [ ], PVD [ ], Obesity [ ],   Sedentary lifestyle [ ], CHF [ ], LAURA [ ], Cancer Hx [ ].    Social History: Smoking [ ], Drug Use [ ], Alcohol Use [ ], Other [ ]    Possible Location of Stroke:    Relevant Brain Tissue Imaging:  < from: CT Head No Cont (01.18.22 @ 19:40) >    No CT evidence of acute intracranial injury.    Severe chronic microvascular ischemic changes.    No significant change since 12/22/2021      Relevant Cerebrovascular Imaging:      Relevant blood tests:                          12.1   9.81  )-----------( 228      ( 18 Jan 2022 19:01 )             38.2   01-18    137  |  102  |  12  ----------------------------<  115<H>  3.5   |  19  |  0.9    Ca    9.9      18 Jan 2022 19:01  Mg     1.7     01-18    TPro  7.2  /  Alb  3.5  /  TBili  0.7  /  DBili  x   /  AST  22  /  ALT  13  /  AlkPhos  117<H>  01-18  PT/INR - ( 18 Jan 2022 19:01 )   PT: 12.10 sec;   INR: 1.05 ratio         PTT - ( 18 Jan 2022 19:01 )  PTT:45.6 sec  Relevant cardiac rhythm monitoring:    Relevant Cardiac Structure: (TTE/PETE +/-):[ ]No intracardiac thrombus/[ ] no vegetation/[ ]no akynesia/EF:    Home Medications:  Depakote 250 mg oral delayed release tablet: 1 tab(s) orally 2 times a day (23 Dec 2021 13:53)  folic acid 1 mg oral tablet: 1 tab(s) orally once a day (23 Dec 2021 13:53)  Lipitor 40 mg oral tablet: 1 tab(s) orally once a day (23 Dec 2021 13:53)  Metoprolol Tartrate 25 mg oral tablet: 1 tab(s) orally 2 times a day (23 Dec 2021 13:53)  Norvasc 5 mg oral tablet: 1 tab(s) orally once a day (23 Dec 2021 13:53)  QUEtiapine 100 mg oral tablet: 1 tab(s) orally once (at bedtime) (23 Dec 2021 13:53)      MEDICATIONS  (STANDING):  lacosamide Injectable 100 milliGRAM(s) IV Push once  LORazepam   Injectable 1 milliGRAM(s) IV Push Once  magnesium sulfate  IVPB 2 Gram(s) IV Intermittent Once      PT/OT/Speech/Rehab/S&Sw/ Cognitive eval results and recommendations:     Exam:    Vital Signs Last 24 Hrs  T(C): 36.9 (18 Jan 2022 18:00), Max: 36.9 (18 Jan 2022 18:00)  T(F): 98.4 (18 Jan 2022 18:00), Max: 98.4 (18 Jan 2022 18:00)  HR: 115 (18 Jan 2022 18:00) (115 - 115)  BP: 145/88 (18 Jan 2022 18:00) (145/88 - 145/88)  BP(mean): --  RR: 18 (18 Jan 2022 18:00) (18 - 18)  SpO2: 100% (18 Jan 2022 18:00) (100% - 100%)    Neurological Exam:  Left sided gaze noted, midline after ativan,  opens eyes to voice  Nonverbal  Residual left sided weakness   Withdraws to pain R>L  Follows some commands     Neurology Consult Note:    DANISHA JIMÉNEZ    1. 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 Patient was on Keppra (discontinued due to drowsiness), then on Depakote(discontinued due to tremors) and started on Vimpat 50 mg BID which was started for the first time on 1/18/22. EMS gave the patient 5 mg Valium. In the ED patient was noted to have seizure like activity given another 5 mg Valium. At bedside when examined patient was shaking her right arm and jerky body, given 1 mg of ativan.        Relevant PMH:   Prior ischemic stroke/TIA[ ], Afib [ ], CAD [ ], HTN [ ], DLD [ ], DM [ ], PVD [ ], Obesity [ ],   Sedentary lifestyle [ ], CHF [ ], LAURA [ ], Cancer Hx [ ].    Social History: Smoking [ ], Drug Use [ ], Alcohol Use [ ], Other [ ]    Possible Location of Stroke:    Relevant Brain Tissue Imaging:  < from: CT Head No Cont (01.18.22 @ 19:40) >    No CT evidence of acute intracranial injury.    Severe chronic microvascular ischemic changes.    No significant change since 12/22/2021      Relevant Cerebrovascular Imaging:      Relevant blood tests:                          12.1   9.81  )-----------( 228      ( 18 Jan 2022 19:01 )             38.2   01-18    137  |  102  |  12  ----------------------------<  115<H>  3.5   |  19  |  0.9    Ca    9.9      18 Jan 2022 19:01  Mg     1.7     01-18    TPro  7.2  /  Alb  3.5  /  TBili  0.7  /  DBili  x   /  AST  22  /  ALT  13  /  AlkPhos  117<H>  01-18  PT/INR - ( 18 Jan 2022 19:01 )   PT: 12.10 sec;   INR: 1.05 ratio         PTT - ( 18 Jan 2022 19:01 )  PTT:45.6 sec  Relevant cardiac rhythm monitoring:    Relevant Cardiac Structure: (TTE/PETE +/-):[ ]No intracardiac thrombus/[ ] no vegetation/[ ]no akynesia/EF:    Home Medications:  Depakote 250 mg oral delayed release tablet: 1 tab(s) orally 2 times a day (23 Dec 2021 13:53)  folic acid 1 mg oral tablet: 1 tab(s) orally once a day (23 Dec 2021 13:53)  Lipitor 40 mg oral tablet: 1 tab(s) orally once a day (23 Dec 2021 13:53)  Metoprolol Tartrate 25 mg oral tablet: 1 tab(s) orally 2 times a day (23 Dec 2021 13:53)  Norvasc 5 mg oral tablet: 1 tab(s) orally once a day (23 Dec 2021 13:53)  QUEtiapine 100 mg oral tablet: 1 tab(s) orally once (at bedtime) (23 Dec 2021 13:53)      MEDICATIONS  (STANDING):  lacosamide Injectable 100 milliGRAM(s) IV Push once  LORazepam   Injectable 1 milliGRAM(s) IV Push Once  magnesium sulfate  IVPB 2 Gram(s) IV Intermittent Once      PT/OT/Speech/Rehab/S&Sw/ Cognitive eval results and recommendations:     Exam:    Vital Signs Last 24 Hrs  T(C): 36.9 (18 Jan 2022 18:00), Max: 36.9 (18 Jan 2022 18:00)  T(F): 98.4 (18 Jan 2022 18:00), Max: 98.4 (18 Jan 2022 18:00)  HR: 115 (18 Jan 2022 18:00) (115 - 115)  BP: 145/88 (18 Jan 2022 18:00) (145/88 - 145/88)  BP(mean): --  RR: 18 (18 Jan 2022 18:00) (18 - 18)  SpO2: 100% (18 Jan 2022 18:00) (100% - 100%)    Neurological Exam:  Left sided gaze noted, midline after ativan,  opens eyes to voice  Nonverbal  Residual left sided weakness   Withdraws to pain R>L  Follows some commands

## 2022-01-18 NOTE — ED PROVIDER NOTE - PROGRESS NOTE DETAILS
Kristina: neuro consult at bedside  Magnesium ordered for prolonged QTc Neuro evaluated pt - recommends admission to medicine, ativan standing for seizures. vimpat 100 mg, and eeg.

## 2022-01-18 NOTE — ED PROVIDER NOTE - OTHER FINDINGS
Nonfasting blood test ordered to check kidney function before CT scan.  Naun Moody M.D.     QT/QTc: 384/538

## 2022-01-18 NOTE — ED PROVIDER NOTE - CARE PLAN
Principal Discharge DX:	Seizure  Secondary Diagnosis:	Hypomagnesemia  Secondary Diagnosis:	Prolonged QT interval   1
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
## 2022-01-19 NOTE — CONSULT NOTE ADULT - CRITICAL CARE SERVICES PROVIDED
Asked to speak with patient and daughter regarding DC plan    Patient comes from [facility] with his wife. Patient has a HX of urinary symptoms/bleeding and is unable to get to the bathroom by himself during the night - patient fell.   Daughter s Critical care services provided

## 2022-01-19 NOTE — PATIENT PROFILE ADULT - FALL HARM RISK - HARM RISK INTERVENTIONS
Assistance OOB with selected safe patient handling equipment/Communicate Risk of Fall with Harm to all staff/Discuss with provider need for PT consult/Monitor for mental status changes/Monitor gait and stability/Provide patient with walking aids - walker, cane, crutches/Reinforce activity limits and safety measures with patient and family/Tailored Fall Risk Interventions/Use of alarms - bed, chair and/or voice tab/Visual Cue: Yellow wristband and red socks/Bed in lowest position, wheels locked, appropriate side rails in place/Call bell, personal items and telephone in reach/Instruct patient to call for assistance before getting out of bed or chair/Non-slip footwear when patient is out of bed/[place] to call system/Physically safe environment - no spills, clutter or unnecessary equipment/Purposeful Proactive Rounding/Room/bathroom lighting operational, light cord in reach
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
## 2022-01-19 NOTE — CHART NOTE - NSCHARTNOTEFT_GEN_A_CORE
Patient was transported to the medical floor. Patient did not receive stat IV vimpat dose in ED.   Patient was found to be Covid+, hypomagnesemia   Delayed IV vimpat dose, patient was increased to IV vimpat 200 mg IVPG STAT.  IV vimpat 100 PVPG q12.  Patient was examined by the bedside. Patient was given 1 mg Ativan x2 for right face twitching and arm twitching. +productive cough  On exam, patient is open eyes to voice stimuli, gaze preference but crossing midline, moving her right>left extremities (hx of cva left sided weakness as per daughter that is her baseline), responding with some words and following some commands.  Discussed with Dr. Najera Patient was transported to the medical floor. Patient did not receive stat IV vimpat dose in ED.   Patient was found to be Covid+, hypomagnesemia   Delayed IV vimpat dose, patient was increased to IV vimpat 200 mg IVPG STAT @1:06AM.  IV vimpat 100 PVPG q12.  Patient was examined by the bedside. Patient was given 1 mg Ativan x2 for right face twitching and arm twitching. +productive cough  On exam, patient is open eyes to voice stimuli, gaze preference but crossing midline, moving her right>left extremities (hx of cva left sided weakness as per daughter that is her baseline), responding with some words and following some commands.  Seizure precautions  Aspiration precuations  Fall precautions  Keep Mg>2  Discussed with Dr. Najera via telehealth call Patient was transported to the medical floor. Patient did not receive stat IV vimpat dose in ED.   Patient was found to be Covid+, hypomagnesemia   Delayed IV vimpat dose, patient was increased to IV vimpat 200 mg IVPG STAT @1:06AM.  IV vimpat 100 PVPG q12.  Patient was examined by the bedside. Patient was given 1 mg Ativan x2 for right face twitching and arm twitching. +productive cough  On exam, patient is open eyes to voice stimuli, gaze preference but crossing midline, moving her right>left extremities (hx of cva left sided weakness as per daughter that is her baseline), responding with some words and following some commands.  Seizure precautions  Aspiration precuations  Fall precautions  Keep Mg>2  q4 neurochecks  Discussed with Dr. Najera via telehealth call

## 2022-01-19 NOTE — CHART NOTE - NSCHARTNOTEFT_GEN_A_CORE
Rapid response called at approximately 2 AM for seizure activity. Patient was visibly shaking with muscle twitching/stiffening and limb flexion. Patient was nonresponsive, pupils midline and reactive to light, tongue fasciculations could not be appreciated.   Vital signs: /85, HR 85, T 98.2, 100% on 4L NC.   FS  Patient was given Ativan 2 mg and seizure broke.   Ativan 1 mg PRN to be ordered as per Dr. Najera's recommendations.   Keep Mg>2  Seizure precautions  Aspiration precautions Rapid response called at approximately 2 AM for seizure activity. Patient was visibly shaking with muscle twitching/stiffening and limb flexion. Patient was nonresponsive, pupils midline and reactive to light, tongue fasciculations could not be appreciated.   Vital signs: /85, HR 85, T 98.2, 100% on 4L NC.   FS  Patient was given Ativan 2 mg and seizure broke.   Ativan 1 mg PRN to be ordered on standby  Keep Mg>2  Seizure precautions  Aspiration precautions  vEEG in AM

## 2022-01-19 NOTE — CONSULT NOTE ADULT - ASSESSMENT
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 All recommendations to follow after interview and examination of the patient.          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 All recommendations to follow after interview and examination of the patient.          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  # AMS   - s/p Vimpat 200 mg at 1 AM on 1/19  - c/w 100mg Q12   - vEEG  - Keep Mg>2  - if GTC >2 minutes, give 1mg ativan  - Seizure/ Fall / Aspiration Precautions  - q4 neurochecks        RECOMMENDATIONS  - Dexamethasone 6mg IVP QD  - f/u Inflammatory markers  - f/u sputum Cx + Blood Cx  - MRSA nares   - Off loading to prevent pressure sores and preventive measures to avoid aspiration   - asymptomaptic covid, no therapy for covid indicated at this time  - Can wean off oxygen, check saturation on RA, Target SpO2 92 % to 96 %         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

## 2022-01-20 NOTE — SWALLOW BEDSIDE ASSESSMENT ADULT - NS SPL SWALLOW CLINIC TRIAL FT
DATE OF SERVICE:  05/10/2019



The patient was seen and evaluated by myself this morning.  Small bowel

follow-through completed yesterday demonstrated no obstruction.  The patient is 
having

multiple bowel movements since that time.  There is no concern for a small-bowel

obstruction at this time.  There is no surgical indicaion. Surgery will sign 
off at this time.  Please re-consult Dr. Santoyo

with any questions or concerns.  The patient was evaluated by

myself and I did discuss the patient with Dr. Santoyo today. 







Job ID:  [id number]



MTDD chewables NT 2' poor mental status.
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
## 2022-01-20 NOTE — EEG REPORT - NS EEG TEXT BOX
Epilepsy Attending Note:     DANISHA JIMÉNEZ    76y Female  MRN [id number]    Vital Signs Last 24 Hrs  T(C): 36.4 (20 Jan 2022 08:00), Max: 37.1 (20 Jan 2022 04:06)  T(F): 97.6 (20 Jan 2022 08:00), Max: 98.7 (20 Jan 2022 04:06)  HR: 97 (20 Jan 2022 08:00) (93 - 101)  BP: 140/79 (20 Jan 2022 08:00) (119/87 - 183/84)  BP(mean): --  RR: 22 (20 Jan 2022 08:00) (18 - 22)  SpO2: 97% (20 Jan 2022 08:00) (97% - 100%)                          12.2   6.31  )-----------( 220      ( 19 Jan 2022 11:00 )             38.8       01-19    142  |  107  |  12  ----------------------------<  138<H>  4.3   |  20  |  0.7    Ca    10.2<H>      19 Jan 2022 11:00  Phos  2.8     01-19  Mg     2.3     01-19    TPro  7.4  /  Alb  3.7  /  TBili  0.7  /  DBili  x   /  AST  17  /  ALT  12  /  AlkPhos  118<H>  01-19      MEDICATIONS  (STANDING):  cefTRIAXone   IVPB      cefTRIAXone   IVPB 1000 milliGRAM(s) IV Intermittent once  dexAMETHasone  Injectable 6 milliGRAM(s) IV Push daily  heparin   Injectable 5000 Unit(s) SubCutaneous every 8 hours  lacosamide IVPB 100 milliGRAM(s) IV Intermittent every 12 hours  lactated ringers. 1000 milliLiter(s) (75 mL/Hr) IV Continuous <Continuous>  pantoprazole  Injectable 40 milliGRAM(s) IV Push daily    MEDICATIONS  (PRN):  LORazepam   Injectable 2 milliGRAM(s) IV Push once PRN Seizure            VEEG in the last 24 hours:    Background-----continues ,less than optimally organized , reactive reaching frequencies in the range of 5-6  hz. with higher amplitude from the left    Focal and generalized slowing-----------  1- mild to moderate generalized slowing 2- mild to moderate left right hemispheric focal slowing     Interictal activity---------------- frequent number of left hemispheric spikes at times in periodic pattern mostly during stimulation    Events----------------  none    Seizures-----------none    Impression:  abnormal as above    Plan - as/neurology  team

## 2022-01-20 NOTE — PROGRESS NOTE ADULT - ASSESSMENT
#Breakthrough seizures  -loaded with vimpat  -placed on VEEG - continues today  -seizure precautions  -Neuro follow up pending   -PRN ativan     #Acute Hypoxic resp failure 2/2 COVID-19 pneumonia   -IV dexamethasone   -check inflammatory markers q48  -ID following  -O2 supplementation as needed   -isolation precautions   -MRSA swab negative     #UTI  -started on rocephin  -follow up urine culture    #HTN/DL  -stable on current tx    #H/o CVA w/ L tristen  #Dementia   -frequent orientation  -hold sedating meds  -fall precautions     #Suspected Vitamin B12 and Folate deficiencies   -continue supplements    Progress Note Handoff  Pending Consults: neuro follow up   Pending Tests: labs  Pending Results: veeg, labs  Family Discussion: discussed medication, veeg and overall plan of care with medical staff.  Family updated by medical staff   Disposition: Home_____/SNF______/Other_____/Unknown at this time_x____ .

## 2022-01-20 NOTE — PROGRESS NOTE ADULT - SUBJECTIVE AND OBJECTIVE BOX
DANISHA JIMÉNEZ  76y  Female      Patient is a 76y old  Female who presents with Status Epilepticus (2022 11:02)      INTERVAL HPI/OVERNIGHT EVENTS:  Patient seen and examined earlier this morning  Lying in bed and undergoing VEEG  calm but does have periods of agitation as per staff  does not follow up commands       REVIEW OF SYSTEMS:  unable to assess due to medical conditional       T(C): 36.4 (22 @ 08:00), Max: 37.1 (22 @ 04:06)  HR: 97 (22 @ 08:00) (93 - 101)  BP: 140/79 (22 @ 08:00) (119/87 - 183/84)  RR: 22 (22 @ 08:00) (18 - 22)  SpO2: 97% (22 @ 08:00) (97% - 100%)      PHYSICAL EXAM:  GENERAL: NAD, elderly, frail female   HEAD:  Atraumatic, Normocephalic  EYES: opens eyes  ENMT: No tonsillar erythema, exudates, or enlargement; Moist mucous membranes, Good dentition, No lesions  NECK: Supple, No JVD, Normal thyroid  NERVOUS SYSTEM:  responds to tactile stimuli   CHEST/LUNG: bilateral ronchi   HEART: Regular rate and rhythm; No murmurs, rubs, or gallops  ABDOMEN: Soft, Nontender, Nondistended; Bowel sounds present  EXTREMITIES:  2+ Peripheral Pulses, No clubbing, cyanosis, or edema  LYMPH: No lymphadenopathy noted  SKIN: No rashes or lesions    Consultant(s) Notes Reviewed:  [x ] YES  [ ] NO  Care Discussed with Consultants/Other Providers [ x] YES  [ ] NO    LAB:                        12.2   6.31  )-----------( 220      ( 2022 11:00 )             38.8         142  |  107  |  12  ----------------------------<  138<H>  4.3   |  20  |  0.7    Ca    10.2<H>      2022 11:00  Phos  2.8       Mg     2.3         TPro  7.4  /  Alb  3.7  /  TBili  0.7  /  DBili  x   /  AST  17  /  ALT  12  /  AlkPhos  118<H>  01-19    LIVER FUNCTIONS - ( 2022 11:00 )  Alb: 3.7 g/dL / Pro: 7.4 g/dL / ALK PHOS: 118 U/L / ALT: 12 U/L / AST: 17 U/L / GGT: x           CARDIAC MARKERS ( 2022 11:00 )  x     / x     / 34 U/L / x     / x      CARDIAC MARKERS ( 2022 23:24 )  x     / x     / 42 U/L / x     / x      CARDIAC MARKERS ( 2022 19:01 )  x     / <0.01 ng/mL / x     / x     / x            Drug Dosing Weight  Height (cm): 149.9 (2022 18:00)  Weight (kg): 49.9 (24 Dec 2021 22:39)  BMI (kg/m2): 22.2 (2022 18:00)  BSA (m2): 1.43 (2022 18:00)        I&O's Summary    2022 07:01  -  2022 07:00  --------------------------------------------------------  IN: 500 mL / OUT: 0 mL / NET: 500 mL      Urinalysis Basic - ( 2022 17:20 )    Color: Yellow / Appearance: Slightly Turbid / S.009 / pH: x  Gluc: x / Ketone: Negative  / Bili: Negative / Urobili: <2 mg/dL   Blood: x / Protein: Trace / Nitrite: Positive   Leuk Esterase: Large / RBC: 0 /HPF / WBC 31 /HPF   Sq Epi: x / Non Sq Epi: 0 /HPF / Bacteria: Few        RADIOLOGY & ADDITIONAL TESTS:  Imaging Personally Reviewed:  [x] YES  [ ] NO  < from: Xray Chest 1 View- PORTABLE-Routine (22 @ 17:15) >  Impression:    No radiographic evidence of acute cardiopulmonary disease.    < end of copied text >      MEDS:  cefTRIAXone   IVPB      cefTRIAXone   IVPB 1000 milliGRAM(s) IV Intermittent once  dexAMETHasone  Injectable 6 milliGRAM(s) IV Push daily  heparin   Injectable 5000 Unit(s) SubCutaneous every 8 hours  lacosamide IVPB 100 milliGRAM(s) IV Intermittent every 12 hours  lactated ringers. 1000 milliLiter(s) IV Continuous <Continuous>  LORazepam   Injectable 2 milliGRAM(s) IV Push once PRN  pantoprazole  Injectable 40 milliGRAM(s) IV Push daily

## 2022-01-20 NOTE — CHART NOTE - NSCHARTNOTEFT_GEN_A_CORE
updated son Emerson @ 3:43pm regarding patient current condition and mental status. all questions and concerns were addressed

## 2022-01-20 NOTE — SWALLOW BEDSIDE ASSESSMENT ADULT - SLP GENERAL OBSERVATIONS
pt received in bed awake confused w/o c/o pain. +room air +pt restless, required repositioning prior to PO trials. pt received in bed awake confused oriented only to name. Pt w/o c/o pain. +room air, restless, requiring repositioning prior to PO trials.

## 2022-01-21 NOTE — SWALLOW BEDSIDE ASSESSMENT ADULT - SLP GENERAL OBSERVATIONS
pt received in bed awake confused oriented only to name. Pt w/o c/o pain. +room air +Staff report toleration of current diet

## 2022-01-21 NOTE — SWALLOW BEDSIDE ASSESSMENT ADULT - SWALLOW EVAL: DIAGNOSIS
+toleration for puree and thin liquids w/o overt s/s aspiration/penetration; decreased awareness of soft consistency
+toleration for puree and thin liquids w/o overt s/s aspiration/penetration

## 2022-01-21 NOTE — PROGRESS NOTE ADULT - SUBJECTIVE AND OBJECTIVE BOX
COLON, DANISHA  76y, Female  Allergy: adhesives (Unknown)  No Known Drug Allergies      LOS  3d    CHIEF COMPLAINT: Status Epilepticus (2022 13:06)      INTERVAL EVENTS/HPI  - No acute events overnight  - T(F): , Max: 97 (22 @ 04:00)  - Tolerating medication  - WBC Count: 10.69 (22 @ 01:10)  WBC Count: 11.90 (22 @ 18:08)     - Creatinine, Serum: 0.7 (22 @ 01:10)  Creatinine, Serum: 0.7 (22 @ 18:08)       ROS  unable to obtain history secondary to patient's mental status and/or sedation     VITALS:  T(F): 96.4, Max: 97 (22 @ 04:00)  HR: 95  BP: 135/81  RR: 20Vital Signs Last 24 Hrs  T(C): 35.8 (2022 08:51), Max: 36.1 (2022 00:00)  T(F): 96.4 (2022 08:51), Max: 97 (2022 04:00)  HR: 95 (2022 08:51) (95 - 105)  BP: 135/81 (2022 08:51) (130/82 - 139/100)  BP(mean): 105 (2022 08:51) (105 - 113)  RR: 20 (2022 08:51) (20 - 23)  SpO2: 100% (2022 04:00) (100% - 100%)    PHYSICAL EXAM:  Gen: NAD  HEENT: Normocephalic, atraumatic  Neck: supple, no lymphadenopathy  CV: Regular rate & regular rhythm  Lungs: decreased BS at bases, no fremitus  Abdomen: Soft, BS present  Ext: Warm, well perfused  Neuro: non focal,  Skin: no rash, no erythema  Lines: no phlebitis     FH: Non-contributory  Social Hx: Non-contributory    TESTS & MEASUREMENTS:                        12.4   10.69 )-----------( 184      ( 2022 01:10 )             39.4         143  |  106  |  10  ----------------------------<  85  3.7   |  19  |  0.7    Ca    9.9      2022 01:10  Mg     2.0         TPro  7.0  /  Alb  3.4<L>  /  TBili  0.6  /  DBili  x   /  AST  28  /  ALT  13  /  AlkPhos  114      eGFR if Non African American: 83 mL/min/1.73M2 (22 @ 01:10)  eGFR if African American: 96 mL/min/1.73M2 (22 @ 01:10)  eGFR if Non African American: 83 mL/min/1.73M2 (22 @ 18:08)  eGFR if : 96 mL/min/1.73M2 (22 @ 18:08)    LIVER FUNCTIONS - ( 2022 18:08 )  Alb: 3.4 g/dL / Pro: 7.0 g/dL / ALK PHOS: 114 U/L / ALT: 13 U/L / AST: 28 U/L / GGT: x           Urinalysis Basic - ( 2022 17:20 )    Color: Yellow / Appearance: Slightly Turbid / S.009 / pH: x  Gluc: x / Ketone: Negative  / Bili: Negative / Urobili: <2 mg/dL   Blood: x / Protein: Trace / Nitrite: Positive   Leuk Esterase: Large / RBC: 0 /HPF / WBC 31 /HPF   Sq Epi: x / Non Sq Epi: 0 /HPF / Bacteria: Few        Culture - Urine (collected 22 @ 17:20)  Source: Catheterized Catheterized  Preliminary Report (22 @ 15:06):    >100,000 CFU/ml Gram Negative Rods    Culture - Blood (collected 22 @ 16:00)  Source: .Blood Blood-Peripheral  Preliminary Report (22 @ 01:01):    No growth to date.    Culture - Urine (collected 21 @ 19:50)  Source: Clean Catch Clean Catch (Midstream)  Final Report (21 @ 05:19):    <10,000 CFU/mL Normal Urogenital Marylin        Lactate, Blood: 0.8 mmol/L (22 @ 23:24)      INFECTIOUS DISEASES TESTING  MRSA PCR Result.: Negative (22 @ 17:20)  Procalcitonin, Serum: 0.07 (22 @ 11:00)  Procalcitonin, Serum: 0.06 (22 @ 23:24)  COVID-19 PCR: Detected (22 @ 21:31)  COVID-19 PCR: NotDetec (21 @ 08:06)  COVID-19 PCR: NotDetec (21 @ 04:30)  MRSA PCR Result.: Negative (21 @ 03:30)  COVID-19 PCR: NotDetec (21 @ 18:55)  COVID-19 PCR: NotDetec (21 @ 14:25)      INFLAMMATORY MARKERS  C-Reactive Protein, Serum: 24 mg/L (22 @ 14:00)  Sedimentation Rate, Erythrocyte: 62 mm/Hr (22 @ 14:00)      RADIOLOGY & ADDITIONAL TESTS:  I have personally reviewed the last available Chest xray  CXR      CT      CARDIOLOGY TESTING  12 Lead ECG:   Ventricular Rate 118 BPM    Atrial Rate 118 BPM    P-R Interval 132 ms    QRS Duration 124 ms    Q-T Interval 384 ms    QTC Calculation(Bazett) 538 ms    P Axis -13 degrees    R Axis -11 degrees    T Axis -22 degrees    Diagnosis Line Sinus tachycardia  Left ventricular hypertrophy  Baseline artifact  Abnormal ECG    Confirmed by AMERICO MARTINEZ, [facility] ([id number]) on 2022 11:00:59 PM (22 @ 20:07)      MEDICATIONS  cefTRIAXone   IVPB     cefTRIAXone   IVPB 1000 IV Intermittent every 24 hours  dexAMETHasone  Injectable 6 IV Push daily  heparin   Injectable 5000 SubCutaneous every 8 hours  lacosamide IVPB 75 IV Intermittent every 12 hours  lactated ringers. 1000 IV Continuous <Continuous>  lactobacillus acidophilus 1 Oral three times a day with meals  pantoprazole  Injectable 40 IV Push daily      WEIGHT  Weight (kg): 49.9 (21 @ 22:39)  Creatinine, Serum: 0.7 mg/dL (22 @ 01:10)  Creatinine, Serum: 0.7 mg/dL (22 @ 18:08)      ANTIBIOTICS:  cefTRIAXone   IVPB      cefTRIAXone   IVPB 1000 milliGRAM(s) IV Intermittent every 24 hours      All available historical records have been reviewed

## 2022-01-21 NOTE — CHART NOTE - NSCHARTNOTESELECT_GEN_ALL_CORE
family update/Event Note
Event Note
Rapid Response
Transfer Note
Transfer Note
family update/Event Note

## 2022-01-21 NOTE — CONSULT NOTE ADULT - SUBJECTIVE AND OBJECTIVE BOX
Neurology Consult Note:    DANISHA JIMÉNEZ    1. 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 Patient was on Keppra (discontinued due to drowsiness), then on Depakote(discontinued due to tremors) and started on Vimpat 50 mg BID which was started for the first time on 1/18/22. EMS gave the patient 5 mg Valium. In the ED patient was noted to have seizure like activity given another 5 mg Valium. At bedside when examined patient was shaking her right arm and jerky body, given 1 mg of ativan.        Relevant PMH:   Prior ischemic stroke/TIA[ ], Afib [ ], CAD [ ], HTN [ ], DLD [ ], DM [ ], PVD [ ], Obesity [ ],   Sedentary lifestyle [ ], CHF [ ], LAURA [ ], Cancer Hx [ ].    Social History: Smoking [ ], Drug Use [ ], Alcohol Use [ ], Other [ ]    Possible Location of Stroke:    Relevant Brain Tissue Imaging:  < from: CT Head No Cont (01.18.22 @ 19:40) >    No CT evidence of acute intracranial injury.    Severe chronic microvascular ischemic changes.    No significant change since 12/22/2021      Relevant Cerebrovascular Imaging:      Relevant blood tests:                                     12.4   10.69 )-----------( 184      ( 21 Jan 2022 01:10 )             39.4   01-21    143  |  106  |  10  ----------------------------<  85  3.7   |  19  |  0.7    Ca    9.9      21 Jan 2022 01:10  Mg     2.0     01-20    TPro  7.0  /  Alb  3.4<L>  /  TBili  0.6  /  DBili  x   /  AST  28  /  ALT  13  /  AlkPhos  114  01-20      Relevant Cardiac Structure: (TTE/PETE +/-):[ ]No intracardiac thrombus/[ ] no vegetation/[ ]no akynesia/EF:    Home Medications:  Depakote 250 mg oral delayed release tablet: 1 tab(s) orally 2 times a day (23 Dec 2021 13:53)  folic acid 1 mg oral tablet: 1 tab(s) orally once a day (23 Dec 2021 13:53)  Lipitor 40 mg oral tablet: 1 tab(s) orally once a day (23 Dec 2021 13:53)  Metoprolol Tartrate 25 mg oral tablet: 1 tab(s) orally 2 times a day (23 Dec 2021 13:53)  Norvasc 5 mg oral tablet: 1 tab(s) orally once a day (23 Dec 2021 13:53)  QUEtiapine 100 mg oral tablet: 1 tab(s) orally once (at bedtime) (23 Dec 2021 13:53)      MEDICATIONS  (STANDING):  cefTRIAXone   IVPB      cefTRIAXone   IVPB 1000 milliGRAM(s) IV Intermittent every 24 hours  dexAMETHasone  Injectable 6 milliGRAM(s) IV Push daily  heparin   Injectable 5000 Unit(s) SubCutaneous every 8 hours  lacosamide IVPB 75 milliGRAM(s) IV Intermittent every 12 hours  lactated ringers. 1000 milliLiter(s) (75 mL/Hr) IV Continuous <Continuous>  magnesium sulfate  IVPB 2 Gram(s) IV Intermittent once  pantoprazole  Injectable 40 milliGRAM(s) IV Push daily     Exam:    Vital Signs Last 24 Hrs  T(C): 35.8 (21 Jan 2022 08:51), Max: 36.1 (21 Jan 2022 00:00)  T(F): 96.4 (21 Jan 2022 08:51), Max: 97 (21 Jan 2022 04:00)  HR: 95 (21 Jan 2022 08:51) (95 - 105)  BP: 135/81 (21 Jan 2022 08:51) (130/82 - 139/100)  BP(mean): 105 (21 Jan 2022 08:51) (105 - 113)  RR: 20 (21 Jan 2022 08:51) (20 - 23)  SpO2: 100% (21 Jan 2022 04:00) (100% - 100%)    Neurology exam:   Patient slightly somnolent but arousable   Follows simple commands   Incomprehensible speech   Moves all 3 extremities   Otherwise unremarkable exam     Assessment and plan:   75 yo female with PMH of Alzheimer's dementia, 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  Patient was on Keppra (discontinued due to drowsiness), then on Depakote(discontinued due to tremors) and started on Vimpat 50 mg BID which was started for the first time on 1/18/22. EMS gave the patient 5 mg Valium. In the ED patient was noted to have seizure like activity given another 5 mg Valium. At bedside when examined patient was shaking her right arm and jerky body, given 1 mg of ativan.     Suggestions   Seizure disorder    - Make Vimpat 75mg BID    - Keep Mg>2      Neurology Consult Note:    DANISHA JIMÉNEZ    1. 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 Patient was on Keppra (discontinued due to drowsiness), then on Depakote(discontinued due to tremors) and started on Vimpat 50 mg BID which was started for the first time on 1/18/22. EMS gave the patient 5 mg Valium. In the ED patient was noted to have seizure like activity given another 5 mg Valium. At bedside when examined patient was shaking her right arm and jerking body, given 1 mg of ativan.        Relevant PMH:   Prior ischemic stroke/TIA[ ], Afib [ ], CAD [ ], HTN [ ], DLD [ ], DM [ ], PVD [ ], Obesity [ ],   Sedentary lifestyle [ ], CHF [ ], LAURA [ ], Cancer Hx [ ].    Social History: Smoking [ ], Drug Use [ ], Alcohol Use [ ], Other [ ]    Possible Location of Stroke:    Relevant Brain Tissue Imaging:  < from: CT Head No Cont (01.18.22 @ 19:40) >    No CT evidence of acute intracranial injury.    Severe chronic microvascular ischemic changes.    No significant change since 12/22/2021      Relevant Cerebrovascular Imaging:      Relevant blood tests:                                     12.4   10.69 )-----------( 184      ( 21 Jan 2022 01:10 )             39.4   01-21    143  |  106  |  10  ----------------------------<  85  3.7   |  19  |  0.7    Ca    9.9      21 Jan 2022 01:10  Mg     2.0     01-20    TPro  7.0  /  Alb  3.4<L>  /  TBili  0.6  /  DBili  x   /  AST  28  /  ALT  13  /  AlkPhos  114  01-20      Relevant Cardiac Structure: (TTE/PETE +/-):[ ]No intracardiac thrombus/[ ] no vegetation/[ ]no akynesia/EF:    Home Medications:  Depakote 250 mg oral delayed release tablet: 1 tab(s) orally 2 times a day (23 Dec 2021 13:53)  folic acid 1 mg oral tablet: 1 tab(s) orally once a day (23 Dec 2021 13:53)  Lipitor 40 mg oral tablet: 1 tab(s) orally once a day (23 Dec 2021 13:53)  Metoprolol Tartrate 25 mg oral tablet: 1 tab(s) orally 2 times a day (23 Dec 2021 13:53)  Norvasc 5 mg oral tablet: 1 tab(s) orally once a day (23 Dec 2021 13:53)  QUEtiapine 100 mg oral tablet: 1 tab(s) orally once (at bedtime) (23 Dec 2021 13:53)      MEDICATIONS  (STANDING):  cefTRIAXone   IVPB      cefTRIAXone   IVPB 1000 milliGRAM(s) IV Intermittent every 24 hours  dexAMETHasone  Injectable 6 milliGRAM(s) IV Push daily  heparin   Injectable 5000 Unit(s) SubCutaneous every 8 hours  lacosamide IVPB 75 milliGRAM(s) IV Intermittent every 12 hours  lactated ringers. 1000 milliLiter(s) (75 mL/Hr) IV Continuous <Continuous>  magnesium sulfate  IVPB 2 Gram(s) IV Intermittent once  pantoprazole  Injectable 40 milliGRAM(s) IV Push daily     Exam:    Vital Signs Last 24 Hrs  T(C): 35.8 (21 Jan 2022 08:51), Max: 36.1 (21 Jan 2022 00:00)  T(F): 96.4 (21 Jan 2022 08:51), Max: 97 (21 Jan 2022 04:00)  HR: 95 (21 Jan 2022 08:51) (95 - 105)  BP: 135/81 (21 Jan 2022 08:51) (130/82 - 139/100)  BP(mean): 105 (21 Jan 2022 08:51) (105 - 113)  RR: 20 (21 Jan 2022 08:51) (20 - 23)  SpO2: 100% (21 Jan 2022 04:00) (100% - 100%)    Neurology exam:   Patient slightly somnolent but arousable   Follows simple commands   Incomprehensible speech   Moves all 3 extremities   Otherwise unremarkable exam     Assessment and plan:   77 yo female with PMH of Alzheimer's dementia, 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  Patient was on Keppra (discontinued due to drowsiness), then on Depakote(discontinued due to tremors) and started on Vimpat 50 mg BID which was started for the first time on 1/18/22. EMS gave the patient 5 mg Valium. In the ED patient was noted to have seizure like activity given another 5 mg Valium. At bedside when examined patient was shaking her right arm and jerky body, given 1 mg of ativan.     Suggestions   Seizure disorder    - Make Vimpat 75mg BID    - Keep Mg>2
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
## 2022-01-21 NOTE — CONSULT NOTE ADULT - ATTENDING COMMENTS
Patient examined this morning in f/u for seizure disorder with breakthrough seizures in setting of Covid and hypomagnesemia.  She did not tolerate keppra due to drowsiness and depakote due to tremor and had just had a single vimpat 50 mg dose when she presented with breakthrough seizures.  Overnight vEEG yesterday was negative for electrographic seizure though showed slowing in right hemisphere and irritability (sharp waves) in left hemisphere.  vEEG discontinued.  Patient remains on vimpat 100 mg bid however is noted to be somewhat drowsy.  Patient not spontaneously talking though can be coaxed into a few words with stimulation.  Spontaneous movement present in extremities. No hemiparesis present.    Impression: Seizures disorder due to hx of stroke and dementia with breakthrough seizures from Covid infection and hypomagnesemia.  No further seizures though needs anticonvulsants for seizure protection.  Current dose of vimpat is a little sedating for her.     Plan:  1.  Reduce vimpat to 75 mg twice a day.  2.  Keep magnesium > 2.0.  3.  Seizure precautions.  4.  Sit patient up in bed and provide stimulation  (TV or radio).    5.  Physical therapy.  6.  Outpatient neurologic f/u in 2-4 weeks.  7.  Please notify neurology if further breakthrough seizures occur.
Patient examined this morning in for breakthrough seizures leading to hospitalization.  She received her first dose of vimpat 50 mg yesterday morning but proceeded to have seizure leading to ER visit and recurrent seizures in ER.  She received valium 5 mg x 2 then ativan.  Later in the evening vimpat 200 mg given IV x 1 then this morning 100 mg IV begun q12.  She was on vEEG this morning when I examined her and I saw no seizure activity.  Patient was awake and vocalizing unintelligibly.  She was moving both upper extremities equally  rearranging the bed sheets.  She would not follow commands.    I spoke to Epilepsy attending who reviewed EEG from am to afternoon and no electrographic seizures seen though epileptiform discharges were present in posterior left hemisphere.  Generalized slowing was seen more prominent on right side.    Note that pt's magnesium was low on presentation and patient received subsequent magnesium run and was determined to be Covid positive.    Impression:  Breakthrough seizures secondary to Covid and hypomagnesemia in setting of underlying seizure disorder from prior stroke and dementia on subtherapeutic anticonvulsant dose.  Additionally prolonged QT identified.  Lacosamide (vimpat) at doses less than 800 mg/day should not prolong QT interval.    Plan:  1.  Continue vimpat 100 mg IV q12 hours, may convert to PO if passes bedside swallow test and no electrographic seizures overnight.  2.  Recheck magnesium and maintain level > 2.0.  3.  Seizure precautions.  4.  Supportive treatment and monitoring for Covid.
I have personally seen and examined this patient.  I have fully participated in the care of this patient.  I have reviewed all pertinent clinical information, including history, physical exam, plan and note.  I have reviewed all pertinent clinical information and reviewed all relevant imaging and diagnostic studies personally.  Corrections and edits were made wherever needed.       #COVID19, non-severe, not requiring supplemental O2     CXR no PNA  #Seizure    no suspicion for bacterial meningitis    CTH no acute changes, hx CVA    - check O2 sat on RA, if < 94% ok for RDV 200mg x 100mg x 4 days  - steroids per primary team   - seizure workup per primary team

## 2022-01-21 NOTE — PROGRESS NOTE ADULT - ASSESSMENT
#Breakthrough seizures  -neurology following - decrease vimpat to 75mg q12  -s/p VEEG  -seizure precautions  -PRN ativan     #Acute Hypoxic resp failure 2/2 COVID-19 pneumonia   -IV dexamethasone   -check inflammatory markers q48  -ID following  -O2 supplementation as needed   -isolation precautions   -MRSA swab negative     #UTI  -started on rocephin  -follow up urine culture    #HTN/DL  -stable on current tx    #H/o CVA w/ L tristen  #Dementia   -frequent orientation  -hold sedating meds  -fall precautions     #Suspected Vitamin B12 and Folate deficiencies   -continue supplements    #History of C.diff  -tested positive for c.diff yesterday  -not having diarrhea  -will not treat at this time  -start probiotics     Progress Note Handoff  Pending Consults: none  Pending Tests: labs  Pending Results: labs  Family Discussion: discussed medication, veeg and overall plan of care with medical staff.  Family updated by medical staff   Disposition: Home_____/SNF______/Other_____/Unknown at this time_x____ .

## 2022-01-21 NOTE — PROGRESS NOTE ADULT - REASON FOR ADMISSION
Status Epilepticus

## 2022-01-21 NOTE — CHART NOTE - NSCHARTNOTEFT_GEN_A_CORE
updated the son clement tarango; today at 2.06pm . All questions and concerns were addressed. Patient's son would like daily updates

## 2022-01-21 NOTE — SWALLOW BEDSIDE ASSESSMENT ADULT - ORAL PHASE
Within functional limits bolus removed from oral cavity/Decreased anterior-posterior movement of the bolus

## 2022-01-21 NOTE — PROGRESS NOTE ADULT - SUBJECTIVE AND OBJECTIVE BOX
JESSYUMANGDANISHA  76y  Female      Patient is a 76y old  Female who presents with Status Epilepticus (2022 11:02)      INTERVAL HPI/OVERNIGHT EVENTS:  Patient seen and examined earlier this morning  s/p VEEG  calm but does have periods of agitation as per staff  does not follow up commands       REVIEW OF SYSTEMS:  unable to assess due to medical conditional       Vital Signs Last 24 Hrs  T(C): 35.8 (2022 08:51), Max: 36.1 (2022 00:00)  T(F): 96.4 (2022 08:51), Max: 97 (2022 04:00)  HR: 95 (2022 08:51) (95 - 105)  BP: 135/81 (2022 08:51) (130/82 - 139/100)  BP(mean): 105 (2022 08:51) (105 - 113)  RR: 20 (2022 08:51) (20 - 23)  SpO2: 100% (2022 04:00) (100% - 100%)      PHYSICAL EXAM:  GENERAL: NAD, elderly, frail, female   HEAD:  Atraumatic, Normocephalic  EYES: opens eyes  ENMT: No tonsillar erythema, exudates, or enlargement; Moist mucous membranes, Good dentition, No lesions  NECK: Supple, No JVD, Normal thyroid  NERVOUS SYSTEM:  responds to tactile stimuli   CHEST/LUNG: bilateral ronchi   HEART: Regular rate and rhythm; No murmurs, rubs, or gallops  ABDOMEN: Soft, Nontender, Nondistended; Bowel sounds present  EXTREMITIES:  2+ Peripheral Pulses, No clubbing, cyanosis, or edema  LYMPH: No lymphadenopathy noted  SKIN: No rashes or lesions    Consultant(s) Notes Reviewed:  [x ] YES  [ ] NO  Care Discussed with Consultants/Other Providers [ x] YES  [ ] NO    LAB:                        12.4   10.69 )-----------( 184      ( 2022 01:10 )             39.4       01-21    143  |  106  |  10  ----------------------------<  85  3.7   |  19  |  0.7    Ca    9.9      2022 01:10  Mg     2.0     01-20    TPro  7.0  /  Alb  3.4<L>  /  TBili  0.6  /  DBili  x   /  AST  28  /  ALT  13  /  AlkPhos  114        Drug Dosing Weight  Height (cm): 149.9 (2022 18:00)  Weight (kg): 49.9 (24 Dec 2021 22:39)  BMI (kg/m2): 22.2 (2022 18:00)  BSA (m2): 1.43 (2022 18:00)          Urinalysis Basic - ( 2022 17:20 )    Color: Yellow / Appearance: Slightly Turbid / S.009 / pH: x  Gluc: x / Ketone: Negative  / Bili: Negative / Urobili: <2 mg/dL   Blood: x / Protein: Trace / Nitrite: Positive   Leuk Esterase: Large / RBC: 0 /HPF / WBC 31 /HPF   Sq Epi: x / Non Sq Epi: 0 /HPF / Bacteria: Few        RADIOLOGY & ADDITIONAL TESTS:  Imaging Personally Reviewed:  [x] YES  [ ] NO  < from: Xray Chest 1 View- PORTABLE-Routine (22 @ 17:15) >  Impression:    No radiographic evidence of acute cardiopulmonary disease.    < end of copied text >      MEDICATIONS  (STANDING):  cefTRIAXone   IVPB      cefTRIAXone   IVPB 1000 milliGRAM(s) IV Intermittent every 24 hours  dexAMETHasone  Injectable 6 milliGRAM(s) IV Push daily  heparin   Injectable 5000 Unit(s) SubCutaneous every 8 hours  lacosamide IVPB 75 milliGRAM(s) IV Intermittent every 12 hours  lactated ringers. 1000 milliLiter(s) (75 mL/Hr) IV Continuous <Continuous>  pantoprazole  Injectable 40 milliGRAM(s) IV Push daily    MEDICATIONS  (PRN):  LORazepam   Injectable 2 milliGRAM(s) IV Push once PRN Seizure
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
## 2022-01-21 NOTE — PROGRESS NOTE ADULT - ASSESSMENT
ASSESSMENT  76y F HTN, dementia, TIA, seizure, CDI, admitted with SEIZURE HYPOMAGNESEMIA PROLONGED QT INTERVAL and COVID19    IMPRESSION  #Acute cystitis    UCX GNR UA 31 WBC    BCX NG  #COVID19, non-severe, not requiring supplemental O2     CXR no PNA  #Seizure    no suspicion for bacterial meningitis    CTH no acute changes, hx CVA  #Cdiff expect PCR to be positive, no diarrhea (unclear why sent)    12/22 +     RECOMMENDATIONS  - f/u UCX  - Ceftriaxone 1g q24h IV  - Patient does not meet criteria for available COVID-19 therapeutics  - Please inform ID if worsening oxygenation     If any questions, please call or send a message on [email or web address] Teams  Please continue to update ID with any pertinent new laboratory or radiographic findings  Spectra [id number]

## 2022-01-22 NOTE — DISCHARGE NOTE PROVIDER - CARE PROVIDER_API CALL
Tyrell Najera)  Neurology  [address] [place] [address], [address]  [place] [address]  Phone: [phone]  Fax: [phone]  Follow Up Time: 2 weeks    Satish Wilson  Internal Medicine  N  [facility], Phys,    Phone: ([phone]  Fax: ([phone]  Established Patient  Follow Up Time: 1 week

## 2022-01-22 NOTE — DISCHARGE NOTE PROVIDER - PROVIDER TOKENS
PROVIDER:[TOKEN:[[id number]],FOLLOWUP:[2 weeks]],PROVIDER:[TOKEN:[[id number]],FOLLOWUP:[1 week],ESTABLISHEDPATIENT:[T]]

## 2022-01-22 NOTE — DISCHARGE NOTE PROVIDER - NSDCMRMEDTOKEN_GEN_ALL_CORE_FT
Aspirin Low Dose 81 mg oral delayed release tablet: 81 tab(s) orally once a day   Depakote 250 mg oral delayed release tablet: 1 tab(s) orally 2 times a day  folic acid 1 mg oral tablet: 1 tab(s) orally once a day  gabapentin 100 mg oral capsule: 1 cap(s) orally every 8 hours  Lipitor 40 mg oral tablet: 1 tab(s) orally once a day  Metoprolol Tartrate 25 mg oral tablet: 1 tab(s) orally 2 times a day  Norvasc 5 mg oral tablet: 1 tab(s) orally once a day  QUEtiapine 100 mg oral tablet: 1 tab(s) orally once (at bedtime)  QUEtiapine 25 mg oral tablet: 1 tab(s) orally 3 times a day, As Needed MDD:75mg  vancomycin 125 mg oral capsule: 1 cap(s) orally 4 times a day   vancomycin 50 mg/mL oral liquid: 2.5 milliliter(s) orally 4 times a day   Vitamin B-12 500 mcg oral tablet: 1 tab(s) orally once a day    Aspirin Low Dose 81 mg oral delayed release tablet: 81 tab(s) orally once a day   cefpodoxime 200 mg oral tablet: 1 tab(s) orally 2 times a day   From 1/23  folic acid 1 mg oral tablet: 1 tab(s) orally once a day  gabapentin 100 mg oral capsule: 1 cap(s) orally every 8 hours  lactobacillus acidophilus oral capsule: 1 tab(s) orally 3 times a day   Lipitor 40 mg oral tablet: 1 tab(s) orally once a day  Metoprolol Tartrate 25 mg oral tablet: 1 tab(s) orally 2 times a day  Norvasc 5 mg oral tablet: 1 tab(s) orally once a day  predniSONE 20 mg oral tablet: 2 tab(s) orally once a day   6 days from 1/23  QUEtiapine 100 mg oral tablet: 1 tab(s) orally once (at bedtime)  QUEtiapine 25 mg oral tablet: 1 tab(s) orally 3 times a day, As Needed MDD:75mg  Vimpat 150 mg oral tablet: 0.5 tab(s) orally 2 times a day MDD:150 mg  Vitamin B-12 500 mcg oral tablet: 1 tab(s) orally once a day

## 2022-01-22 NOTE — DISCHARGE NOTE PROVIDER - NSDCHOSPICE_GEN_A_CORE
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     Please call patient with direction  Thank you No

## 2022-01-22 NOTE — DISCHARGE NOTE PROVIDER - NSDCCPCAREPLAN_GEN_ALL_CORE_FT
PRINCIPAL DISCHARGE DIAGNOSIS  Diagnosis: Seizure  Assessment and Plan of Treatment: You came to the hospital after having Seizure.   You were found to be COVID + on admission required NC at the beginning so started on Steroid treatment for 10 days.   You were evaluated by Neurology team during your stay and had VEEG done.  You were found to have +UA, evaluated by Infectious disease team and started on IV antibiotics on 1/20  You are  now, hemodynamically stable, on Room Air saturating >95%  #Seizures disorder due to history of stroke and dementia with break through seizure from Covid infection and hypomagnesemia.  Neuro Recommendations as follow:  No further seizures though needs anticonvulsants for seizure protection.     vimpat to 75 mg twice a day.  Keep magnesium > 2.0.  Seizure precautions. Mg wnl as of today 1/22  Sit patient up in bed and provide stimulation  (TV or radio).    Outpatient neurologic f/u in 2-4 weeks.        SECONDARY DISCHARGE DIAGNOSES  Diagnosis: Hypomagnesemia  Assessment and Plan of Treatment: It is very important you follow up with your PCP and repeat your blood work. As low Magnesium level can stimulate seizure activity.    Diagnosis: Pneumonia due to COVID-19 virus  Assessment and Plan of Treatment: Coronavirus disease 2019 (COVID-19) is a respiratory illness  that can spread from person to person. The virus that causes  COVID-19 is a novel coronavirus that was first identified during  an investigation into an outbreak in [place], [place].  The virus that causes COVID-19 probably emerged from an  animal source, but is now spreading from person to person.  The virus is thought to spread mainly between people who  are in close contact with one another (within about 6 feet)  through respiratory droplets produced when an infected  person coughs or sneezes. It also may be possible that a person  can get COVID-19 by touching a surface or object that has  the virus on it and then touching their own mouth, nose, or  possibly their eyes, but this is not thought to be the main  way the virus spreads.  Please stay home and avoid contact with others for at least a week after symptoms resolve and follow government guidelines.   Patients with COVID-19 have had mild to severe respiratory  illness with symptoms of  • fever  • cough  • shortness of breath  People can help protect themselves from respiratory illness with  everyday preventive actions.    • Avoid close contact with people who are sick.  • Avoid touching your eyes, nose, and mouth with  unwashed hands.  • Wash your hands often with soap and water for at least 20   seconds. Use an alcohol-based hand  that contains at  least 60% alcohol if soap and water are not available.   Stay home when you are sick.  • Cover your cough or sneeze with a tissue, then throw the  tissue in the trash.  • Clean and disinfect frequently touched objects  and surfaces.  Call 911 and inform them you are covid positive before you decide to go to the emergency room if you have chest pain, difficulty breathing, high fevers, worsening of your symptoms, feel unwell, or have nausea and vomiting.

## 2022-01-22 NOTE — DISCHARGE NOTE NURSING/CASE MANAGEMENT/SOCIAL WORK - NSDCPEFALRISK_GEN_ALL_CORE
For information on Fall & Injury Prevention, visit: [email or web address] OR  [email or web address] OR  https://www.cdc.gov/steadi/patient.html

## 2022-01-22 NOTE — DISCHARGE NOTE PROVIDER - NSDCFUADDINST_GEN_ALL_CORE_FT
Your Seizure medication dose is adjusted and sent to your pharmacy  Please take your medications as prescribed  Please follow up with your PCP and Neurologist within 1-2 weeks  Please continue to monitor your bp at home Your Seizure medication dose is adjusted and sent to your pharmacy  Please take your medications as prescribed  Please follow up with your PCP and Neurologist within 1-2 weeks  Please continue to monitor your bp at home  Hold seroquel , if pt has drowsiness

## 2022-01-22 NOTE — DISCHARGE NOTE PROVIDER - HOSPITAL COURSE
77 yo female with PMH of Alzheimer's dementia, 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 Patient was on Keppra (discontinued due to drowsiness), then on Depakote(discontinued due to tremors) and started on Vimpat 50 mg BID which was started for the first time on 1/18/22. EMS gave the patient 5 mg Valium. In the ED patient was noted to have seizure like activity given another 5 mg Valium. At bedside when examined patient was shaking her right arm and jerking body, given 1 mg of ativan.     Patient was admitted for status epilepticus. Patient received IV vimpat 200 mg IVPG STAT @1:06AM and given 1 mg Ativan x2 for right face twitching and arm twitching. On exam, patient is open eyes to voice stimuli, gaze preference but crossing midline, moving her right>left extremities (hx of cva left sided weakness as per daughter that is her baseline), responding with some words and following some commands at the time   1/19: Rapid response called at approximately 2 AM  for seizure activity. Patient was visibly shaking with muscle twitching/stiffening and limb flexion. Patient was nonresponsive, pupils midline and reactive to light, tongue fasciculations could not be appreciated. Vital signs: /85, HR 85, T 98.2, 100% on 4L NC. Patient was given Ativan 2 mg and seizure broke. Patient had repeat seizure like activity at 3 AM and was given Ativan 1 mg IV. Patient was approved for upgrade to CEU for closer monitoring as per Pulmonology.    Pt was found to be COVID + on admission required NC at the beginning so started on Decadron for 10 days.   Pt was evaluated by Neurology team during her stay and had VEEG done.  Pt was found to have +UA, evaluated by ID and started on IV Rocephin 1/20.  Pt is now, hemodynamically stable, on RA sat >95%.    #Seizures disorder due to hx of stroke and dementia with breakthrough seizures from Covid infection and hypomagnesemia.  - Neuro recs appreciated:   No further seizures though needs anticonvulsants for seizure protection.  Current dose of vimpat is a little sedating for her.   Reduce vimpat to 75 mg twice a day.  Keep magnesium > 2.0.  Seizure precautions. Mg wnl as of today 1/22  Sit patient up in bed and provide stimulation  (TV or radio).    Outpatient neurologic f/u in 2-4 weeks.      #Acute Hypoxic resp failure 2/2 COVID-19 pneumonia   - Started on IV dexamethasone 1/18 (will be d/c on Prednisone 40 mg for the remaining days)  - MRSA swab negative   - Evaluated by ID: Patient does not meet criteria for available COVID-19 therapeutics    #UTI  -started on rocephin 1/20 (will be d/c on Vantin for remaining days)  -Ucx 1/19: GNR    #HTN/DL  - Can continue her home medication Amlodopine 5 mg daily and f/u with PCP    #H/o CVA w/ L tristen  #Dementia   -hold sedating meds  -fall precautions       #History of C.diff  -tested positive for c.diff 1/120  -not having diarrhea  -will not treat at this time as per ID  -started probiotics    77 yo female with PMH of Alzheimer's dementia, 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 Patient was on Keppra (discontinued due to drowsiness), then on Depakote(discontinued due to tremors) and started on Vimpat 50 mg BID which was started for the first time on 1/18/22. EMS gave the patient 5 mg Valium. In the ED patient was noted to have seizure like activity given another 5 mg Valium. At bedside when examined patient was shaking her right arm and jerking body, given 1 mg of ativan.     Patient was admitted for status epilepticus. Patient received IV vimpat 200 mg IVPG STAT @1:06AM and given 1 mg Ativan x2 for right face twitching and arm twitching. On exam, patient is open eyes to voice stimuli, gaze preference but crossing midline, moving her right>left extremities (hx of cva left sided weakness as per daughter that is her baseline), responding with some words and following some commands at the time   1/19: Rapid response called at approximately 2 AM  for seizure activity. Patient was visibly shaking with muscle twitching/stiffening and limb flexion. Patient was nonresponsive, pupils midline and reactive to light, tongue fasciculations could not be appreciated. Vital signs: /85, HR 85, T 98.2, 100% on 4L NC. Patient was given Ativan 2 mg and seizure broke. Patient had repeat seizure like activity at 3 AM and was given Ativan 1 mg IV. Patient was approved for upgrade to CEU for closer monitoring as per Pulmonology.    Pt was found to be COVID + on admission required NC at the beginning so started on Decadron for 10 days.   Pt was evaluated by Neurology team during her stay and had VEEG done.  Pt was found to have +UA, evaluated by ID and started on IV Rocephin 1/20.  Pt is now, hemodynamically stable, on RA sat >95%.    #Seizures disorder due to hx of stroke and dementia with breakthrough seizures from Covid infection and hypomagnesemia.  - Neuro recs appreciated:   No further seizures though needs anticonvulsants for seizure protection.  Current dose of vimpat is a little sedating for her.   Reduce vimpat to 75 mg twice a day.  Keep magnesium > 2.0.  Seizure precautions. Mg wnl as of today 1/22  Sit patient up in bed and provide stimulation  (TV or radio).    Outpatient neurologic f/u in 2-4 weeks.      #Acute Hypoxic resp failure 2/2 COVID-19 pneumonia   - Started on IV dexamethasone 1/18 (will be d/c on Prednisone 40 mg for the remaining days)  - MRSA swab negative   - Evaluated by ID: Patient does not meet criteria for available COVID-19 therapeutics    #UTI  -started on rocephin 1/20 (will be d/c on Vantin for remaining days)  -Ucx 1/19: GNR    #HTN/DL  - Can continue her home medication Amlodopine 5 mg daily and f/u with PCP    #H/o CVA w/ L tristen  #Dementia   -hold sedating meds  -Cont with ASA and Lipitor      #History of C.diff  -tested positive for c.diff 1/120  -not having diarrhea  -will not treat at this time as per ID  -started probiotics    77 yo female with PMH of Alzheimer's dementia, 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 Patient was on Keppra (discontinued due to drowsiness), then on Depakote(discontinued due to tremors) and started on Vimpat 50 mg BID which was started for the first time on 1/18/22. EMS gave the patient 5 mg Valium. In the ED patient was noted to have seizure like activity given another 5 mg Valium. At bedside when examined patient was shaking her right arm and jerking body, given 1 mg of ativan.     Patient was admitted for status epilepticus. Patient received IV vimpat 200 mg IVPG STAT @1:06AM and given 1 mg Ativan x2 for right face twitching and arm twitching. On exam, patient is open eyes to voice stimuli, gaze preference but crossing midline, moving her right>left extremities (hx of cva left sided weakness as per daughter that is her baseline), responding with some words and following some commands at the time   1/19: Rapid response called at approximately 2 AM  for seizure activity. Patient was visibly shaking with muscle twitching/stiffening and limb flexion. Patient was nonresponsive, pupils midline and reactive to light, tongue fasciculations could not be appreciated. Vital signs: /85, HR 85, T 98.2, 100% on 4L NC. Patient was given Ativan 2 mg and seizure broke. Patient had repeat seizure like activity at 3 AM and was given Ativan 1 mg IV. Patient was approved for upgrade to CEU for closer monitoring as per Pulmonology.    Pt was found to be COVID + on admission required NC at the beginning so started on Decadron for 10 days.   Pt was evaluated by Neurology team during her stay and had VEEG done.  Pt was found to have +UA, evaluated by ID and started on IV Rocephin 1/20.  Pt is now, hemodynamically stable, on RA sat >95%.    #Seizures disorder due to hx of stroke and dementia with breakthrough seizures from Covid infection and hypomagnesemia.  - Neuro recs appreciated:   No further seizures though needs anticonvulsants for seizure protection.  Current dose of vimpat is a little sedating for her.   Reduce vimpat to 75 mg twice a day.  Keep magnesium > 2.0.  Seizure precautions. Mg wnl as of today 1/22  Sit patient up in bed and provide stimulation  (TV or radio).    Outpatient neurologic f/u in 2-4 weeks.      #Acute Hypoxic resp failure 2/2 COVID-19 pneumonia   - Xray Chest 1 View- PORTABLE-Routine (01.19.22 @ 17:15) No radiographic evidence of acute cardiopulmonary disease.  -  Started on IV dexamethasone 1/18 (will be d/c on Prednisone 40 mg for the remaining days)  - MRSA swab negative   - Evaluated by ID: Patient does not meet criteria for available COVID-19 therapeutics  - oxygen sat 98 on RA    #UTI  -started on rocephin 1/20 (will be d/c on Vantin for remaining days)  -Ucx 1/19: GNR    #HTN  - Can continue her home medication Amlodopine and metoprololand f/u with PCP    #H/o CVA w/ L hemiparesis  #Dementia   -Cont with ASA and Lipitor  - Seroquel prn for agitation      #History of C.diff  -tested positive for c.diff   -not having diarrhea  -will not treat at this time as per ID  -started probiotics

## 2022-01-22 NOTE — DISCHARGE NOTE PROVIDER - ATTENDING DISCHARGE PHYSICAL EXAMINATION:
T(C): 36.5 (01-22-22 @ 11:59), Max: 36.7 (01-21-22 @ 19:09)  HR: 104 (01-22-22 @ 11:59) (89 - 120)  BP: 156/93 (01-22-22 @ 11:59) (126/83 - 194/102)  RR: 18 (01-22-22 @ 11:59) (18 - 20)  SpO2: 98% (01-22-22 @ 11:59) (97% - 99%)    P/E : awake, alert, not oriented  HEENT: atraumatic, EOMI  Chest: clear  CVA: S1S2+, no murmur  P/A: soft  Ext Left AKA, no edema feet  CNA: awake, alert not oriented T(C): 36.5 (01-22-22 @ 11:59), Max: 36.7 (01-21-22 @ 19:09)  HR: 104 (01-22-22 @ 11:59) (89 - 120)  BP: 156/93 (01-22-22 @ 11:59) (126/83 - 194/102)  RR: 18 (01-22-22 @ 11:59) (18 - 20)  SpO2: 98% (01-22-22 @ 11:59) (97% - 99%)    P/E : awake, alert, not oriented  HEENT: atraumatic, EOMI  Chest: clear  CVA: S1S2+, no murmur  P/A: soft  Ext Left AKA, no edema feet  CNA: awake, alert not oriented, residual left hemiparesis

## 2022-01-22 NOTE — DISCHARGE NOTE NURSING/CASE MANAGEMENT/SOCIAL WORK - PATIENT PORTAL LINK FT
You can access the FollowMyHealth Patient Portal offered by [facility] by registering at the following website: [email or web address]. By joining [facility]’s FollowMyHealth portal, you will also be able to view your health information using other applications (apps) compatible with our system.

## 2022-01-22 NOTE — PROVIDER CONTACT NOTE (OTHER) - ACTION/TREATMENT ORDERED:
MD Wong aware, EKG ordered. Will continue to monitor.
MD Wong made aware will continue to monitor pt.

## 2022-02-02 NOTE — H&P ADULT - HISTORY OF PRESENT ILLNESS
This is a 77 yo F with PMHx of Alzheimer's dementia, 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 Code stroke was initially called, no tpa or intervention. Imaging negative. Family states pt admitted for seizures in November December and January. Son Mr. Velasquez states previous seizures are noted to have the same symptoms as patient presented with today, Left arm "heaviness", "looking to the left." Pt takes vimpat 75 mg BID at home.

## 2022-02-02 NOTE — ED ADULT TRIAGE NOTE - CHIEF COMPLAINT QUOTE
bibems for altered mental status. Pt sitting in bed 1 hour ago talking to daughter, deviation to left side, stopped speaking as per daughter. Daughter denies any seizure today . Code Stroke activated

## 2022-02-02 NOTE — ED PROVIDER NOTE - ATTENDING CONTRIBUTION TO CARE
76F with pmh Alzheimer's dementia, CVA with left sided weakness and left facial droop, Left AKA, seizure d/o on vimpat 75mg BID, UTI, HTN, HLD. Review of chart shows she was previously on Keppra but discontinued due to drowsiness, then on Depakote discontinued due to tremors and then recently admitted 1/18-1/22 for status epilepticus, treated for UTI and was found to be covid positive. She wrapped as a stroke alert, due to left arm weakness and left-sided gaze, however on evaluation she was found to be seizing.  NIH initially 23 however symptoms improved dramatically after Ativan was given.     Gen - fixed gaze left, mute, not following commands, Head - NCAT, Eyes- fixed gaze left, Pharynx - clear, MMM, Heart - RRR, no m/g/r, Lungs - CTAB, no w/c/r, Abdomen - soft, NT, ND, Skin - No rash, Extremities - FROM, no edema, erythema, ecchymosis, brisk cap refill, Neuro - A&O x0, 2/5 strength LUE, aphasic    a/p:  status epilepticus  s/p ativan, keppra bolus  HCT, labs, neurology following  seizure precautions  will reassess 76F with pmh Alzheimer's dementia, CVA with left sided weakness and left facial droop, Left AKA, seizure d/o on vimpat 75mg BID, UTI, HTN, HLD. Review of chart shows she was previously on Keppra but discontinued due to drowsiness, then on Depakote discontinued due to tremors and then recently admitted 1/18-1/22 for status epilepticus, treated for UTI and was found to be covid positive. She wrapped as a stroke alert, due to left arm weakness and left-sided gaze, however on evaluation she was found to be seizing.  NIH initially 23 however symptoms improved dramatically after Ativan was given.     Gen - fixed gaze left, mute, not following commands, Head - NCAT, Eyes- fixed gaze left, Pharynx - clear, MMM, Heart - RRR, no m/g/r, Lungs - CTAB, no w/c/r, Abdomen - soft, NT, ND, Skin - No rash, Extremities - L AKA noted, no edema, erythema, ecchymosis, brisk cap refill, Neuro - A&O x0, 2/5 strength LUE, aphasic    a/p:  status epilepticus  s/p ativan, keppra bolus  HCT, labs, neurology following  seizure precautions  will reassess

## 2022-02-02 NOTE — ED PROVIDER NOTE - PROGRESS NOTE DETAILS
JR: spoke to neuro ICU- will admit to their service if EEG is abnormal; Will be done in ~30mins and will call back with dispo JR: was made aware that will admit to neuro ICU under Dr. Madrid

## 2022-02-02 NOTE — H&P ADULT - NSHPPHYSICALEXAM_GEN_ALL_CORE
PHYSICAL EXAM:    T(F): 97 (02-02-22 @ 17:50), Max: 101.6 (02-02-22 @ 12:39)  HR: 80 (02-02-22 @ 17:50) (80 - 124)  BP: 100/63 (02-02-22 @ 17:50) (92/59 - 163/72)  RR: 16 (02-02-22 @ 17:50) (16 - 20)  SpO2: 100% (02-02-22 @ 17:50) (95% - 100%)  GENERAL: NAD, well-developed  CHEST/LUNG: CTAB  HEART: RRR, no murmurs  ABDOMEN: Soft, Nontender, Nondistended; Bowel sounds present  EXTREMITIES:  2+ Peripheral Pulses, No clubbing, cyanosis, or edema  NEURO: Sedated

## 2022-02-02 NOTE — CONSULT NOTE ADULT - ASSESSMENT
Assessment:  This is a 75 yo F with PMHx of Alzheimer's dementia, 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 Code stroke was initially called, no tpa or intervention. Imaging negative. Family states pt admitted for seizures in November December and January. Son Mr. Velasquez states previous seizures are noted to have the same symptoms as patient presented with today, Left arm "heaviness", "looking to the left." Pt takes vimpat 75 mg BID at home.     Plan:  - CT head reviewed   - Loaded with 2 g Keppra in ED   - Received 2 doses of 2 mg IV ativan in ED  - Keep Mg>2  - Fall & Seizure precautions  - EEG stat   - VEEG when able   - Further medication recommendations when EEG read obtained       ex [id number]          Assessment:  This is a 75 yo F with PMHx of Alzheimer's dementia, 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 Code stroke was initially called, no tpa or intervention. Imaging negative. Family states pt admitted for seizures in November December and January. Son Mr. Velasquez states previous seizures are noted to have the same symptoms as patient presented with today, Left arm "heaviness", "looking to the left." Pt takes vimpat 75 mg BID at home.     Plan:  - CT head reviewed   - Loaded with 2 g Keppra in ED   - Received 2 doses of 2 mg IV ativan in ED  - Keep Mg>2  - Fall & Seizure precautions  - EEG stat   - VEEG when able   - Further medication recommendations when EEG read obtained   - admit to neuro icu       ex [id number]

## 2022-02-02 NOTE — H&P ADULT - NSHPLABSRESULTS_GEN_ALL_CORE
13.0   12.73 )-----------( 163      ( 2022 12:00 )             40.6           142  |  107  |  14  ----------------------------<  152<H>  3.6   |  25  |  0.9    Ca    9.3      2022 12:00  Mg     1.7         TPro  6.6  /  Alb  4.0  /  TBili  0.9  /  DBili  x   /  AST  17  /  ALT  15  /  AlkPhos  107  02              Urinalysis Basic - ( 2022 12:08 )    Color: Light Yellow / Appearance: Slightly Turbid / S.008 / pH: x  Gluc: x / Ketone: Negative  / Bili: Negative / Urobili: <2 mg/dL   Blood: x / Protein: Trace / Nitrite: Negative   Leuk Esterase: Large / RBC: 8 /HPF /  /HPF   Sq Epi: x / Non Sq Epi: 0 /HPF / Bacteria: Many            Lactate Trend      CARDIAC MARKERS ( 2022 12:00 )  x     / <0.01 ng/mL / x     / x     / x            CAPILLARY BLOOD GLUCOSE      POCT Blood Glucose.: 164 mg/dL (2022 12:05)        Culture Results:   No Growth Final ( @ 01:10)  Culture Results:   >100,000 CFU/ml Escherichia coli ( @ 17:20)  Culture Results:   No Growth Final ( @ 16:00)
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
## 2022-02-02 NOTE — ED PROVIDER NOTE - CLINICAL SUMMARY MEDICAL DECISION MAKING FREE TEXT BOX
76F with pmh Alzheimer's dementia, CVA with left sided weakness and left facial droop, Left AKA, seizure d/o on vimpat 75mg BID, UTI, HTN, HLD. On arrival patient was in status epilepticus, status post Ativan and Keppra bolus.  Labs and imaging reviewed.  Patient was evaluated by neurology and agreed for placement in the neuro ICU for status epilepticus.  Patient was treated for UTI with Rocephin. Head CT showed no acute changes. 76F with pmh Alzheimer's dementia, CVA with left sided weakness and left facial droop, Left AKA, seizure d/o on vimpat 75mg BID, UTI, HTN, HLD. On arrival patient was in status epilepticus, status post Ativan and Keppra bolus.  Labs and imaging reviewed.   Patient was treated for UTI/sepsis with Rocephin- BCx sent, lactate drawn & trend. Patient was evaluated by neurology and agreed for placement in the neuro ICU for status epilepticus. Head CT showed no acute changes. EEG done in the ED. 76F with pmh Alzheimer's dementia, CVA with left sided weakness and left facial droop, Left AKA, seizure d/o on vimpat 75mg BID, UTI, HTN, HLD. On arrival patient was in status epilepticus, status post Ativan and Keppra bolus.  Labs and imaging reviewed.   Patient was treated for UTI/sepsis with Rocephin- BCx sent, lactate drawn & trend. Patient was evaluated by neurology and agreed for placement in the neuro ICU for status epilepticus. Head CT showed no acute changes. EEG done in the ED and neuro to admit to their service (ICU) under Dr. Madrid.

## 2022-02-02 NOTE — ED ADULT NURSE NOTE - OBJECTIVE STATEMENT
pt was BIBA, CODE STROKE was initiated in triage. as per daughter, pt was speaking with daughter and suddenly pt stopped speaking and gazed to the left. on arrival pt was nonverbal, not folowing commands, and actively seizing.

## 2022-02-02 NOTE — CONSULT NOTE ADULT - ASSESSMENT
This is a 77 yo F with PMHx of Alzheimer's dementia, CVA with left sided weakness and left facial droop, Left AKA, seizure d/o, UTI, HTN, HLD presents to the ER for seizure activity, was in status epilepticus. Code Stroke called. NIHSS 23. No tpa given.    Plan:  - CT head reviewed  - Continue AED regimen  - Loaded with 2 g Keppra  - Received 2 doses of 2 mg IV ativan in ED  - Transfer to neurocritical care for further monitoring  - Keep Mg>2  - Fall & Seizure precautions

## 2022-02-02 NOTE — ED PROVIDER NOTE - PHYSICAL EXAMINATION
Physical Exam    Vital Signs: I have reviewed the initial vital signs.  Constitutional: well-nourished, appears stated age, no acute distress  Eyes: Conjunctiva pink, Sclera clear, pt has left eye gaze  Cardiovascular: S1 and S2, regular rate, regular rhythm, well-perfused extremities, radial pulses equal and 2+ b/l.   Respiratory: unlabored respiratory effort, clear to auscultation bilaterally no wheezing, rales and rhonchi. pt is speaking full sentences. no accessory muscle use.   Gastrointestinal: soft, non-tender, nondistended abdomen, no pulsatile mass, normal bowl sounds, no rebound, no guarding,  Musculoskeletal: pt has left [place]  Integumentary: warm, dry, no rash  Neurologic: pt was seizing with tremulous left lower extremity.   Psychiatric: appropriate mood, appropriate affect

## 2022-02-02 NOTE — ED PROVIDER NOTE - CARE PLAN
Principal Discharge DX:	Status epilepticus  Secondary Diagnosis:	Acute UTI   1 Principal Discharge DX:	Status epilepticus  Secondary Diagnosis:	Acute UTI  Secondary Diagnosis:	Sepsis

## 2022-02-03 NOTE — OCCUPATIONAL THERAPY INITIAL EVALUATION ADULT - GROSSLY INTACT, SENSORY
not following commands for formal assessment, + turns to gentle touch and +withdraw to pain will continue to assess as ability to communicate improves

## 2022-02-03 NOTE — PATIENT PROFILE ADULT - FALL HARM RISK - HARM RISK INTERVENTIONS
Assistance with ambulation/Assistance OOB with selected safe patient handling equipment/Communicate Risk of Fall with Harm to all staff/Discuss with provider need for PT consult/Monitor for mental status changes/Monitor gait and stability/Move patient closer to nurses' station/Provide patient with walking aids - walker, cane, crutches/Reinforce activity limits and safety measures with patient and family/Reorient to person, place and time as needed/Tailored Fall Risk Interventions/Toileting schedule using arm’s reach rule for commode and bathroom/Use of alarms - bed, chair and/or voice tab/Visual Cue: Yellow wristband and red socks/Bed in lowest position, wheels locked, appropriate side rails in place/Call bell, personal items and telephone in reach/Instruct patient to call for assistance before getting out of bed or chair/Non-slip footwear when patient is out of bed/[place] to call system/Physically safe environment - no spills, clutter or unnecessary equipment/Purposeful Proactive Rounding/Room/bathroom lighting operational, light cord in reach

## 2022-02-03 NOTE — PHYSICAL THERAPY INITIAL EVALUATION ADULT - ADDITIONAL COMMENTS
Granddaughter assists pt. with all activities since Thanksgiving including showering, dressing, and ADLs. Shower chair and 5 grab bars; bathtub entrance. 1:1 feed. Pt's daughter Magy notified to bring pt's prosthesis to bedside for OOB activity. Pt has a w/c and RW at home, amb HHA, daughter reported pt was unsafe using RW.

## 2022-02-03 NOTE — PHYSICAL THERAPY INITIAL EVALUATION ADULT - LEVEL OF INDEPENDENCE: SIT/STAND, REHAB EVAL
Not assessed 2* safety awareness concerns. Prosthesis for LLE  not present at b/s at this time. Will asses when present.

## 2022-02-03 NOTE — PROGRESS NOTE ADULT - SUBJECTIVE AND OBJECTIVE BOX
SUMMARY:  This is a 77 yo F with PMHx of Alzheimer's dementia, 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 Code stroke was initially called, no tpa or intervention. Imaging negative. Family states pt admitted for seizures in November December and January. Son Mr. Velasquez states previous seizures are noted to have the same symptoms as patient presented with today, Left arm "heaviness", "looking to the left." Pt takes vimpat 75 mg BID at home. Pt admitted to Neuro ICU for seizure management and fever workup.     OVERNIGHT EVENTS: none     ADMISSION SCORES:   GCS: HH: MF: NIHSS: ICH Score:    SEDATION SCORES:  RASS: CAM-ICU:     REVIEW OF SYSTEMS:    VITALS: [] Reviewed    IMAGING/DATA: [] Reviewed    IVF FLUIDS/MEDICATIONS: [] Reviewed    ALLERGIES: Allergies    adhesives (Unknown)  valproic acid (Unknown)    Intolerances    DEVICES:   [] Restraints [] PIVs [] ET tube [] central line [] PICC [] arterial line [] jacobs [] NGT/OGT [] EVD [] LD [] VLADIMIR/HMV [] LiCOX [] ICP monitor [] Trach [] PEG [] Chest Tube [] other:    EXAMINATION:  General: No acute distress  HEENT: Anicteric sclerae  Cardiac: [id number]  Lungs: Clear  Abdomen: Soft, non-tender, +BS  Extremities: No c/c/e  Skin/Incision Site: Clean, dry and intact  Neurologic: Awake, alert, fully oriented, follows commands, PERRL, VFFtc, EOMI, face symmetric, tongue midline, no drift, full strength SUMMARY:  This is a 75 yo F with PMHx of Alzheimer's dementia, 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 Code stroke was initially called, no tpa or intervention. Imaging negative. Family states pt admitted for seizures in November December and January. Son Mr. Velasquez states previous seizures are noted to have the same symptoms as patient presented with today, Left arm "heaviness", "looking to the left." Pt takes vimpat 75 mg BID at home. Pt admitted to Neuro ICU for seizure management and fever workup.     OVERNIGHT EVENTS: none     ADMISSION SCORES:   GCS: HH: MF: NIHSS: ICH Score:    SEDATION SCORES:  RASS: CAM-ICU:     REVIEW OF SYSTEMS:    VITALS: [] Reviewed    IMAGING/DATA: [] Reviewed    IVF FLUIDS/MEDICATIONS: [] Reviewed    ALLERGIES: Allergies    adhesives (Unknown)  valproic acid (Unknown)    Intolerances    DEVICES:   [] Restraints [] PIVs [] ET tube [] central line [] PICC [] arterial line [] jacobs [] NGT/OGT [] EVD [] LD [] VLADIMIR/HMV [] LiCOX [] ICP monitor [] Trach [] PEG [] Chest Tube [] other:    EXAMINATION:  General: No acute distress  HEENT: Anicteric sclerae  Cardiac: [id number]  Lungs: Clear  Abdomen: Soft, non-tender, +BS  Extremities: No c/c/e  Skin/Incision Site: Clean, dry and intact  Neurologic: Awake, alert, pt states "she is in hospital," [place] speaking, follows commands. PERRL, VFFtc, EOMI, face symmetric (although known to have left facial droop from previous stroke), tongue midline, no drift, RUE 5/5, LUE drifts but does not hit the bed (baseline weakness), RLE moves spontaneously in plane of the bed

## 2022-02-03 NOTE — PHYSICAL THERAPY INITIAL EVALUATION ADULT - GENERAL OBSERVATIONS, REHAB EVAL
14:32-15:15 Pt. encountered sleeping in semifowler in bed in NAD. Aroused to voice. +Radial A Line L UE, + IV R UE, +BP cuff R UE, +EEG with head covering, +Primafit, +Tele, +Pulse oxy, +O2 3lpm NC. Daughter Magy present at bedside and helped to translate from Uzbek to English to facilitate the patient interview process. Pt. was alert but confused. Agreeable to PT andrés.

## 2022-02-03 NOTE — PATIENT PROFILE ADULT - LANGUAGE ASSISTANCE NEEDED
Unable to respond Pt is confused but arousable/Yes-Patient/Caregiver accepts free interpretation services...
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
## 2022-02-03 NOTE — PHYSICAL THERAPY INITIAL EVALUATION ADULT - IMPAIRMENTS FOUND, PT EVAL
arousal, attention, and cognition/cognitive impairment/gait, locomotion, and balance/gross motor/muscle strength/poor safety awareness

## 2022-02-03 NOTE — OCCUPATIONAL THERAPY INITIAL EVALUATION ADULT - PERTINENT HX OF CURRENT PROBLEM, REHAB EVAL
75 yo F with PMHx of Alzheimer's dementia, 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 Code stroke was initially called, no tpa or intervention. Imaging negative. Family states pt admitted for seizures in November December and January

## 2022-02-03 NOTE — PHYSICAL THERAPY INITIAL EVALUATION ADULT - LIVES WITH, PROFILE
Lives in an apartment with granddaughter. [address], no steps inside. Bathroom and bedroom access on ground level.

## 2022-02-03 NOTE — PHYSICAL THERAPY INITIAL EVALUATION ADULT - PERTINENT HX OF CURRENT PROBLEM, REHAB EVAL
This is a 77 yo F with PMHx of Alzheimer's dementia, 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 Code stroke was initially called, no tpa or intervention. Imaging negative. Family states pt admitted for seizures in November December and January.

## 2022-02-03 NOTE — PHYSICAL THERAPY INITIAL EVALUATION ADULT - LEVEL OF INDEPENDENCE: STAIR NEGOTIATION, REHAB EVAL
Not assessed 2* safety awareness concerns. Prosthesis for LLE  not present at b/s at this time. Will asses when present

## 2022-02-03 NOTE — PROGRESS NOTE ADULT - ATTENDING COMMENTS
76 year old Armenian speaking lady with PMHx of Alzheimer's dementia, 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 Family states pt admitted for seizures in November December and January. Son Mr. Velasquez states previous seizures are noted to have the same symptoms as patient presented with today, Left arm "heaviness", "looking to the left." Pt takes vimpat 75 mg BID at home. Patient had fever spike at presentation with + UA  Plan:  -VEEG (Stat EEG yesterday was -ve)  - Vimpat increased to 150 mg BID  - Keep Mg>2  - Fall & Seizure precautions  -Blood c/s, gm -ve rods, d/c vanc, continue ceftazidime

## 2022-02-03 NOTE — OCCUPATIONAL THERAPY INITIAL EVALUATION ADULT - MANUAL MUSCLE TESTING RESULTS, REHAB EVAL
pt not following commands for formal assessment, will continue to assess- decreased  strength L hand

## 2022-02-03 NOTE — OCCUPATIONAL THERAPY INITIAL EVALUATION ADULT - RANGE OF MOTION EXAMINATION, UPPER EXTREMITY
Left UE Active ROM was WFL (within functional limits)/Left UE Passive ROM was WFL  (within functional limits)/bilateral UE Active ROM was WFL  (within functional limits)

## 2022-02-04 NOTE — EEG REPORT - NS EEG TEXT BOX
Epilepsy Attending Note:     DANISHA JIMÉNEZ    76y Female  MRN [id number]    Vital Signs Last 24 Hrs  T(C): 36.9 (04 Feb 2022 04:34), Max: 36.9 (04 Feb 2022 04:34)  T(F): 98.5 (04 Feb 2022 04:34), Max: 98.5 (04 Feb 2022 04:34)  HR: 97 (04 Feb 2022 04:34) (68 - 97)  BP: 131/64 (04 Feb 2022 04:34) (89/42 - 133/67)  BP(mean): 90 (04 Feb 2022 04:34) (57 - 105)  RR: 18 (04 Feb 2022 04:34) (15 - 36)  SpO2: 100% (03 Feb 2022 18:00) (97% - 100%)                          11.6   6.34  )-----------( 143      ( 04 Feb 2022 04:30 )             36.3       02-04    134<L>  |  102  |  10  ----------------------------<  100<H>  3.3<L>   |  23  |  0.8    Ca    9.2      04 Feb 2022 04:30  Phos  2.2     02-04  Mg     2.2     02-04    TPro  5.8<L>  /  Alb  3.4<L>  /  TBili  0.6  /  DBili  x   /  AST  13  /  ALT  10  /  AlkPhos  89  02-04      MEDICATIONS  (STANDING):  cefTAZidime  IVPB 1 Gram(s) IV Intermittent every 8 hours  chlorhexidine 2% Cloths 1 Application(s) Topical <User Schedule>  heparin   Injectable 5000 Unit(s) SubCutaneous every 12 hours  lacosamide IVPB 150 milliGRAM(s) IV Intermittent every 12 hours  potassium chloride    Tablet ER 20 milliEquivalent(s) Oral every 2 hours  senna 2 Tablet(s) Oral at bedtime    MEDICATIONS  (PRN):            VEEG in the last 24 hours:    Background------------ continues  , less than optimally organized reaching frequencies in the range of 6-7 Hz    Focal and generalized slowing-   ---- 1-mild to moderate generalized slowing.  2- moderate right hemispheric and mid to moderate bioccipital/posterior quadrant focal slowing right > left    Interictal activity----------   frequent right FT sharps on occasions appear in psuedo-periodic pattern  and moderate number of independent  left or bilateral synchronous occipital  sharp waves     Events------------   none    Seizures---------  none    Impression:  abnormal as above    Plan - as/neurology team
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
## 2022-02-04 NOTE — PROGRESS NOTE ADULT - SUBJECTIVE AND OBJECTIVE BOX
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 Code stroke was initially called, no tpa or intervention. Imaging negative. Family states pt admitted for seizures in November December and January. Son Mr. Velasquez states previous seizures are noted to have the same symptoms as patient presented with today, Left arm "heaviness", "looking to the left." Pt takes vimpat 75 mg BID at home. Pt admitted to Neuro ICU for seizure management and fever workup.     Overnight events   Transferred from neuro ICU    Subjective complaints   No acute complaints    Present Today:   - Horowitz:  No [  ], Yes [   ] : Indication:     - Type of IV Access:       .. CVC/Piccline:  No [  ], Yes [   ] : Indication:       .. Midline: No [  ], Yes [   ] : Indication:                               OBJECTIVE  PAST MEDICAL & SURGICAL HISTORY  Hypertension    Dementia    Chronic GERD    History of TIA (transient ischemic attack)    Seizure disorder    Folate deficiency    Vitamin B12 deficiency    S/P AKA (above knee amputation), left    H/O left nephrectomy                                             ALLERGIES:  adhesives (Unknown)  valproic acid (Unknown)                                        HOME MEDICATIONS  Home Medications:  folic acid 1 mg oral tablet: 1 tab(s) orally once a day (02 Feb 2022 18:24)  Lipitor 40 mg oral tablet: 1 tab(s) orally once a day (02 Feb 2022 18:24)  Metoprolol Tartrate 25 mg oral tablet: 1 tab(s) orally once a day (02 Feb 2022 18:24)  Norvasc 5 mg oral tablet: 1 tab(s) orally once a day (02 Feb 2022 18:24)                           MEDICATIONS:  STANDING MEDICATIONS  cefTAZidime  IVPB 1 Gram(s) IV Intermittent every 8 hours  chlorhexidine 2% Cloths 1 Application(s) Topical <User Schedule>  heparin   Injectable 5000 Unit(s) SubCutaneous every 12 hours  lacosamide IVPB 50 milliGRAM(s) IV Intermittent once  levETIRAcetam 500 milliGRAM(s) Oral two times a day  senna 2 Tablet(s) Oral at bedtime    PRN MEDICATIONS                                            VITAL SIGNS: Last 24 Hours  T(C): 36.9 (04 Feb 2022 20:32), Max: 36.9 (04 Feb 2022 04:34)  T(F): 98.4 (04 Feb 2022 20:32), Max: 98.5 (04 Feb 2022 04:34)  HR: 95 (04 Feb 2022 20:32) (88 - 97)  BP: 121/56 (04 Feb 2022 20:32) (121/56 - 131/64)  BP(mean): 88 (04 Feb 2022 13:32) (88 - 90)  RR: 18 (04 Feb 2022 20:32) (18 - 18)  SpO2: 99% (04 Feb 2022 20:32) (97% - 99%)      02-03-22 @ 07:01  -  02-04-22 @ 07:00  --------------------------------------------------------  IN: 860 mL / OUT: 2375 mL / NET: -1515 mL    02-04-22 @ 07:01  -  02-04-22 @ 21:39  --------------------------------------------------------  IN: 0 mL / OUT: 600 mL / NET: -600 mL                                               LABS:                        11.6   6.34  )-----------( 143      ( 04 Feb 2022 04:30 )             36.3     02-04    134<L>  |  102  |  10  ----------------------------<  100<H>  3.3<L>   |  23  |  0.8    Ca    9.2      04 Feb 2022 04:30  Phos  2.2     02-04  Mg     2.2     02-04    TPro  5.8<L>  /  Alb  3.4<L>  /  TBili  0.6  /  DBili  x   /  AST  13  /  ALT  10  /  AlkPhos  89  02-04                Culture - Blood (collected 02 Feb 2022 13:45)  Source: .Blood Blood-Peripheral  Gram Stain (03 Feb 2022 09:13):    Growth in aerobic bottle: Gram Negative Rods  Preliminary Report (04 Feb 2022 12:06):    Growth in aerobic bottle: Escherichia coli    ***Blood Panel PCR results on this specimen are available    approximately 3 hours after the Gram stain result.***    Gram stain, PCR, and/or culture results may not always    correspond due to difference in methodologies.    ************************************************************    This PCR assay was performed by multiplex PCR. This    Assay tests for 66 bacterial and resistance gene targets.    Please refer to the [facility] test directory    at [email or web address] for details.  Organism: Blood Culture PCR (03 Feb 2022 11:41)  Organism: Blood Culture PCR (03 Feb 2022 11:41)    Culture - Blood (collected 02 Feb 2022 13:45)  Source: .Blood Blood-Peripheral  Preliminary Report (03 Feb 2022 22:02):    No growth to date.    Culture - Urine (collected 02 Feb 2022 12:45)  Source: Catheterized Catheterized  Preliminary Report (04 Feb 2022 13:55):    >100,000 CFU/ml Escherichia coli                                                    -------------------------------------------------------------  RADIOLOGY:                                            --------------------------------------------------------------    PHYSICAL EXAM:  General: NAD, sitting in bed comfortably  HEENT: atraumatic, normocephalic, EEG monitor/electrodes in place  LUNGS: CTAB, unlabored respirations  HEART:S1S2+, RRR  ABDOMEN: BS+, soft, nontender, nondistended  EXT:no clubbing, cyanosis or edema  NEURO:AAOx1  SKIN:no rashes or lesions

## 2022-02-04 NOTE — PROGRESS NOTE ADULT - ASSESSMENT
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 Downgraded from NCC. Likely seizures 2/2 underlying infection.    Plan:  - CT head reviewed, no acute pathology, severe chronic ischemic microvascular changes  - EEG showed mild to mod generalized slowing, mod right hemispheric and milt to moderate bi-occipital/posterior quadrant focal slowing R > L, frequent R FT sharp waves in pseudo periodic pattern  - Cont Keppra 500 BID po  - Increase Vimpat to 200 mg BID  - Ativan 2 mg IV prn for seizures > 2 min  - Keep Mg>2  - Fall & Seizure precautions  - Neurology will continue to follow  - Continue rest of care as per primary team

## 2022-02-04 NOTE — PROGRESS NOTE ADULT - SUBJECTIVE AND OBJECTIVE BOX
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 Code stroke was initially called, no tpa or intervention. Imaging negative. Family states pt admitted for seizures in November December and January. Son Mr. Velasquez states previous seizures are noted to have the same symptoms as patient presented with today, Left arm "heaviness", "looking to the left." Pt takes vimpat 75 mg BID at home.  (02 Feb 2022 17:58)      Interval History:    Patient seen and examined at bedside. There were no acute events overnight. Patient seen sitting up in bed, conversing with daughter and louis po diet. Appears more awake and alert today.      PAST MEDICAL & SURGICAL HISTORY:  Hypertension  Dementia  Chronic GERD  History of TIA (transient ischemic attack)  Seizure disorder  Folate deficiency  Vitamin B12 deficiency  S/P AKA (above knee amputation), left  H/O left nephrectomy      Medications:  cefTAZidime  IVPB 1 Gram(s) IV Intermittent every 8 hours  chlorhexidine 2% Cloths 1 Application(s) Topical <User Schedule>  heparin   Injectable 5000 Unit(s) SubCutaneous every 12 hours  lacosamide IVPB 150 milliGRAM(s) IV Intermittent every 12 hours  senna 2 Tablet(s) Oral at bedtime      Vital Signs Last 24 Hrs  T(C): 36.6 (04 Feb 2022 13:32), Max: 36.9 (04 Feb 2022 04:34)  T(F): 97.8 (04 Feb 2022 13:32), Max: 98.5 (04 Feb 2022 04:34)  HR: 88 (04 Feb 2022 13:32) (74 - 97)  BP: 127/61 (04 Feb 2022 13:32) (101/67 - 131/65)  BP(mean): 88 (04 Feb 2022 13:32) (78 - 100)  RR: 18 (04 Feb 2022 13:32) (18 - 28)  SpO2: 97% (04 Feb 2022 13:32) (97% - 100%)      Neurological Exam:   Mental status: Awake, alert and oriented x3. No dysarthria, no aphasia. Follows commands.  Cranial nerves: Pupils equally round and reactive to light, extraocular muscles intact, V1 through V3 intact bilaterally and symmetric, face symmetric, tongue was midline.  Motor: Moves all 3 extremities anti-gravity. L AKA. Normal tone and bulk.  No abnormal movements.    Sensation: Intact to light touch, pain, temperature and vibration.  Coordination: No dysmetria on finger-to-nose and heel-to-shin.   Reflexes: 2+ in bilateral UE/LE, downgoing toes bilaterally.         Labs:  CBC Full  -  ( 04 Feb 2022 04:30 )  WBC Count : 6.34 K/uL  RBC Count : 3.90 M/uL  Hemoglobin : 11.6 g/dL  Hematocrit : 36.3 %  Platelet Count - Automated : 143 K/uL  Mean Cell Volume : 93.1 fL  Mean Cell Hemoglobin : 29.7 pg  Mean Cell Hemoglobin Concentration : 32.0 g/dL      02-04    134<L>  |  102  |  10  ----------------------------<  100<H>  3.3<L>   |  23  |  0.8    Ca    9.2      04 Feb 2022 04:30  Phos  2.2     02-04  Mg     2.2     02-04    TPro  5.8<L>  /  Alb  3.4<L>  /  TBili  0.6  /  DBili  x   /  AST  13  /  ALT  10  /  AlkPhos  89  02-04    LIVER FUNCTIONS - ( 04 Feb 2022 04:30 )  Alb: 3.4 g/dL / Pro: 5.8 g/dL / ALK PHOS: 89 U/L / ALT: 10 U/L / AST: 13 U/L / GGT: x
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
## 2022-02-05 NOTE — DIETITIAN INITIAL EVALUATION ADULT. - FACTORS AFF FOOD INTAKE
lost dentures; frequent hospital admissions/difficulty chewing/difficulty feeding self/other (specify)

## 2022-02-05 NOTE — DIETITIAN INITIAL EVALUATION ADULT. - PHYSCIAL ASSESSMENT
Appearance: with dementia; mild muscle loss (shoulders, clavicles)  GI/Bowel: WDL  Oral: lost dentures; on soft and bite sized texture  Skin: L buttocks P/U - stage 2 per flowsheet; Left AKA; No edema Thin, mild muscle loss/other (specify)

## 2022-02-05 NOTE — DIETITIAN NUTRITION RISK NOTIFICATION - TREATMENT: THE FOLLOWING DIET HAS BEEN RECOMMENDED
Diet, DASH/TLC:   Sodium & Cholesterol Restricted  Soft and Bite Sized (SOFTBTSZ)  Supplement Feeding Modality:  Oral  Ensure Enlive Cans or Servings Per Day:  1       Frequency:  Three Times a day (02-05-22 @ 12:03) [Pending Verification By Attending]  Diet, Soft and Bite Sized (02-03-22 @ 11:53) [Active]    Patient with moderate malnutrition in the context of acute illness as evidenced by mild muscle loss (clavicles, shoulders) and Weight loss of >7.5% in 3 months

## 2022-02-05 NOTE — DIETITIAN INITIAL EVALUATION ADULT. - OTHER INFO
Ntr Hx Cont: Patient with NKFA / intolerances. Requires Feeding Assistance at meals. RD fed patient this morning. She consumed 100% of oatmeal, ~50% of scrambled eggs and sausage, 100% of yogurt and 100% of apple juice.     Pertinent Medical Information:  Patient is 75 yo female with PMHx of Alzheimer's dementia, CVA with left sided weakness and elft facial droop, Left AKA, seizure d/p, UTI, HTN, HLD. She was admitted for Seizures. She is being treated for the following:   #Seizures  -Video connected to patient 2/3   #UTI; E. coli bacteremia  #H/O CVA  #Dementia  #HTN  #LDL  #B12 deficiency
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
## 2022-02-05 NOTE — DIETITIAN INITIAL EVALUATION ADULT. - IDEAL BODY WEIGHT (KG)
Abdomen , soft, nontender, nondistended , no guarding or rigidity , no masses palpable , normal bowel sounds, negative Garcia's sign, negative psoas and obturators signs, negative CVA tenderness bilaterally Liver and Spleen , no hepatosplenomegaly Rectal deferred
38.5

## 2022-02-05 NOTE — CONSULT NOTE ADULT - ASSESSMENT
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, Left AKA, seizure d/o, UTI, HTN, HLD presents to the ER for seizure activity    IMPRESSION  #Seizure activity  # E coli bacteremia secondary to UTI   - Blood Cx Feb 2 E coli     #Recurrent UTI  #Alzheimer's dementia  #CVA   #Left AKA   #Obesity BMI (kg/m2): 19.7  #Abx allergy: valproic acid (Unknown)    RECOMMENDATIONS  - continue ceftriaxone 1g daily   - follow-up E coli susceptibilities in blood Cx  - check Renal US  - will plan 14 day course of antibiotics -- would give at least 5 days of IV and continue while-in house   - for recurrent UTI will discuss with family regarding prophylaxis including premarin cream BID and  Macrobid after completing course for current infection -- will have to discuss risks and benefits also since with recent C diff   - start PO vancomycin 125 mg BID for C diff prophylaxis     Please call or message on [employer] Teams if with any questions.  Spectra [phone]

## 2022-02-05 NOTE — PROGRESS NOTE ADULT - SUBJECTIVE AND OBJECTIVE BOX
Pt seen and examined w/ daughter serving as .    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 Code stroke was initially called, no tpa or intervention. Imaging negative. Family states pt admitted for seizures in November December and January. Son Mr. Velasquez states previous seizures are noted to have the same symptoms as patient presented with today, Left arm "heaviness", "looking to the left." Pt takes vimpat 75 mg BID at home.  (02 Feb 2022 17:58)    S: Patient was examined and seen at bedside. This morning pt is resting comfortably in bed and reports no new issues or overnight events. No complaints, feels well. Poor historian.  Denies CP, SOB, N/V/D/C/AP, cough, F, chills, dizziness, new focal weakness, HA, vision changes, dysuria, or urinary symptoms, blood in stool.  ROS: all other systems reviewed and are negative    PAST MEDICAL & SURGICAL HISTORY:  Hypertension    Dementia    Chronic GERD    History of TIA (transient ischemic attack)    Seizure disorder    Folate deficiency    Vitamin B12 deficiency    S/P AKA (above knee amputation), left    H/O left nephrectomy    General: NAD. Looks stated age.  HEENT: clean oropharynx, EOMI, no LAD  Neck: trachea midline, no thyromegaly  CV: nl S1 S2; no m/r/g  Resp: decreased breath sounds at base  GI: NT/ND/S +BS  MS: no clubbing/cyanosis/edema, +Lt AKA  Neuro: motor, sensory intact; + reflexes  Skin: no rashes, nl turgor  Psychiatric: AA0x1+ w/ poor insight and judgement            MEDICATIONS  (STANDING):  cefTRIAXone   IVPB 2000 milliGRAM(s) IV Intermittent every 24 hours  chlorhexidine 2% Cloths 1 Application(s) Topical <User Schedule>  heparin   Injectable 5000 Unit(s) SubCutaneous every 12 hours  lacosamide IVPB 200 milliGRAM(s) IV Intermittent every 12 hours  levETIRAcetam 500 milliGRAM(s) Oral two times a day  QUEtiapine 25 milliGRAM(s) Oral every 12 hours  senna 2 Tablet(s) Oral at bedtime  tamsulosin 0.4 milliGRAM(s) Oral at bedtime    MEDICATIONS  (PRN):  LORazepam   Injectable 2 milliGRAM(s) IV Push once PRN seizure    Home Medications:  folic acid 1 mg oral tablet: 1 tab(s) orally once a day (02 Feb 2022 18:24)  Lipitor 40 mg oral tablet: 1 tab(s) orally once a day (02 Feb 2022 18:24)  Metoprolol Tartrate 25 mg oral tablet: 1 tab(s) orally once a day (02 Feb 2022 18:24)  Norvasc 5 mg oral tablet: 1 tab(s) orally once a day (02 Feb 2022 18:24)    Vital Signs Last 24 Hrs  T(C): 37 (05 Feb 2022 13:27), Max: 37 (05 Feb 2022 13:27)  T(F): 98.6 (05 Feb 2022 13:27), Max: 98.6 (05 Feb 2022 13:27)  HR: 96 (05 Feb 2022 13:27) (95 - 102)  BP: 112/57 (05 Feb 2022 13:27) (112/57 - 130/70)  BP(mean): --  RR: 18 (05 Feb 2022 13:27) (17 - 18)  SpO2: 99% (04 Feb 2022 20:32) (99% - 99%)  CAPILLARY BLOOD GLUCOSE        LABS:                        12.6   5.66  )-----------( 181      ( 05 Feb 2022 07:18 )             39.7     02-05    143  |  108  |  9<L>  ----------------------------<  100<H>  4.2   |  24  |  0.8    Ca    10.1      05 Feb 2022 07:18  Phos  2.2     02-04  Mg     1.9     02-05    TPro  6.7  /  Alb  4.1  /  TBili  0.6  /  DBili  x   /  AST  12  /  ALT  11  /  AlkPhos  103  02-05    LIVER FUNCTIONS - ( 05 Feb 2022 07:18 )  Alb: 4.1 g/dL / Pro: 6.7 g/dL / ALK PHOS: 103 U/L / ALT: 11 U/L / AST: 12 U/L / GGT: x                           Consultant Notes Reviewed:  [x ] YES  [ ] NO  Care Discussed with Consultants/Other Providers/ Housestaff [ x] YES  [ ] NO  Radiology, labs, new studies personally reviewed.

## 2022-02-05 NOTE — DIETITIAN INITIAL EVALUATION ADULT. - OTHER CALCULATIONS
Based on RD [facility] weight 42.7 kg (2/5); Energy: 1281- 1494 kcal/day (30-35 kcal/kg); Protein: 51-64 gm/day (1.2 - 1.5 gm/kg); Fluid: 1494 - 1708 mL/day (35-40 mL/kg) - age, BMI, P/U considered for needs

## 2022-02-05 NOTE — DIETITIAN INITIAL EVALUATION ADULT. - MALNUTRITION
Moderate Malnutrition in the context of acute illness as evidenced by mild muscle loss (clavicles, shoulders)

## 2022-02-05 NOTE — PROGRESS NOTE ADULT - ASSESSMENT
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 Code stroke was initially called, no tpa or intervention. Imaging negative. Family states pt admitted for seizures in November December and January. Currently asymptomatic, VEEG monitoring continuously. Discussed patients care at length with son.     Suggestion:  -Continue VEEG  -Continue Vimpat and Keppra  -Continue management of infectious process.   -Seizure precautions  -Keep magnesium >2  - Patient with several UTIs recently causing admissions, son asking for plan going forward regarding UTIs.  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 Code stroke was initially called, no tpa or intervention. Imaging negative. Family states pt admitted for seizures in November December and January. Currently asymptomatic, VEEG monitoring continuously. Discussed patients care at length with son.     Suggestion:  -Continue VEEG  -Continue Vimpat and Keppra  -Continue management of infectious process.   -Seizure precautions  -Keep magnesium >2  - Patient with several UTIs recently causing admissions, son asking for plan going forward regarding UTIs.   -restart quetiapine 25 mg QAM, 100 mg QHS (home dose)

## 2022-02-05 NOTE — DIETITIAN INITIAL EVALUATION ADULT. - ORAL INTAKE PTA/DIET HISTORY
Patient is Beninese Speaking and with dementia. Diet hx obtained from son ([phone]). Per son, patient has good appetite and po intake when home (eats 3 meals a day) and also has Ensure vanilla flavor Twice a day (with lunch meal and 2 hrs before bedtime). Per son, Po intake has decreased and Patient is with weight loss d/t loss of dentures during previous hospital admission and frequent hospital admissions (11/25/21; 12/22/21; 1/18/22).   lbs (Oct 2021).

## 2022-02-05 NOTE — PROGRESS NOTE ADULT - ATTENDING COMMENTS
I have personally seen and examined this patient.  I have fully participated in the care of this patient.  I have reviewed all pertinent clinical information, including history, physical exam, plan and note.   I have reviewed all pertinent clinical information and reviewed all relevant imaging and diagnostic studies personally.  PT without seizure activity overnight on VEEG but still has pseudoPLEDs.  More agitated and combative with increased confusion this AM.  Per family pt has baseline dementia with behavioral issues takes quetiapine 100 mg QHS and 25 mg PRN.  Recommend restart quetiapine 25 mg in AM and 100 mg QHS.  Continue vimpat and keppra at same dose.  Continue VEEG for now.  Recommendations as above.  Agree with above assessment except as noted.

## 2022-02-05 NOTE — PROGRESS NOTE ADULT - SUBJECTIVE AND OBJECTIVE BOX
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 Code stroke was initially called, no tpa or intervention. Imaging negative. Family states pt admitted for seizures in November December and January. Son Mr. Velasquez states previous seizures are noted to have the same symptoms as patient presented with today, Left arm "heaviness", "looking to the left." Pt takes vimpat 75 mg BID at home.  (02 Feb 2022 17:58)      Vital Signs Last 24 Hrs  T(C): 36.9 (04 Feb 2022 20:32), Max: 36.9 (04 Feb 2022 04:34)  T(F): 98.4 (04 Feb 2022 20:32), Max: 98.5 (04 Feb 2022 04:34)  HR: 95 (04 Feb 2022 20:32) (88 - 97)  BP: 121/56 (04 Feb 2022 20:32) (121/56 - 131/64)  BP(mean): 88 (04 Feb 2022 13:32) (88 - 90)  RR: 18 (04 Feb 2022 20:32) (18 - 18)  SpO2: 99% (04 Feb 2022 20:32) (97% - 99%)    Neurological Exam:   At hour of sleep   Patient awake alert oriented to self place  L AKA  Grossly non focal  No events this morning.        Medications:  MEDICATIONS  (STANDING):  cefTAZidime  IVPB 1 Gram(s) IV Intermittent every 8 hours  chlorhexidine 2% Cloths 1 Application(s) Topical <User Schedule>  heparin   Injectable 5000 Unit(s) SubCutaneous every 12 hours  lacosamide IVPB 200 milliGRAM(s) IV Intermittent every 12 hours  levETIRAcetam 500 milliGRAM(s) Oral two times a day  senna 2 Tablet(s) Oral at bedtime      Labs:  CBC Full  -  ( 04 Feb 2022 04:30 )  WBC Count : 6.34 K/uL  RBC Count : 3.90 M/uL  Hemoglobin : 11.6 g/dL  Hematocrit : 36.3 %  Platelet Count - Automated : 143 K/uL  Mean Cell Volume : 93.1 fL  Mean Cell Hemoglobin : 29.7 pg  Mean Cell Hemoglobin Concentration : 32.0 g/dL  Auto Neutrophil # : x  Auto Lymphocyte # : x  Auto Monocyte # : x  Auto Eosinophil # : x  Auto Basophil # : x  Auto Neutrophil % : x  Auto Lymphocyte % : x  Auto Monocyte % : x  Auto Eosinophil % : x  Auto Basophil % : x    02-04    134<L>  |  102  |  10  ----------------------------<  100<H>  3.3<L>   |  23  |  0.8    Ca    9.2      04 Feb 2022 04:30  Phos  2.2     02-04  Mg     2.2     02-04    TPro  5.8<L>  /  Alb  3.4<L>  /  TBili  0.6  /  DBili  x   /  AST  13  /  ALT  10  /  AlkPhos  89  02-04    LIVER FUNCTIONS - ( 04 Feb 2022 04:30 )  Alb: 3.4 g/dL / Pro: 5.8 g/dL / ALK PHOS: 89 U/L / ALT: 10 U/L / AST: 13 U/L / GGT: x             VEEG in the last 24 hours:    Background------------ continues  , less than optimally organized reaching frequencies in the range of 6-7 Hz    Focal and generalized slowing-   ---- 1-mild to moderate generalized slowing.  2- moderate right hemispheric and mid to moderate bioccipital/posterior quadrant focal slowing right > left    Interictal activity----------   frequent right FT sharps on occasions appear in psuedo-periodic pattern  and moderate number of independent  left or bilateral synchronous occipital  sharp waves     Events------------   none    Seizures---------  none    Impression:  abnormal as above    Plan - as/neurology team          Electronic Signatures:  Hansel Peña)  (Signed 04-Feb-2022 10:31)  	Authored: EEG REPORT Neurology Progress Note    Interval History:    This is a 77 yo F with PMHx of Alzheimer's dementia, 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 Code stroke was initially called, no tpa or intervention. Imaging negative. Family states pt admitted for seizures in November December and January. Son Mr. Velasquez states previous seizures are noted to have the same symptoms as patient presented with today, Left arm "heaviness", "looking to the left." Pt takes vimpat 75 mg BID at home.  (02 Feb 2022 17:58)      Vital Signs Last 24 Hrs  T(C): 36.9 (04 Feb 2022 20:32), Max: 36.9 (04 Feb 2022 04:34)  T(F): 98.4 (04 Feb 2022 20:32), Max: 98.5 (04 Feb 2022 04:34)  HR: 95 (04 Feb 2022 20:32) (88 - 97)  BP: 121/56 (04 Feb 2022 20:32) (121/56 - 131/64)  BP(mean): 88 (04 Feb 2022 13:32) (88 - 90)  RR: 18 (04 Feb 2022 20:32) (18 - 18)  SpO2: 99% (04 Feb 2022 20:32) (97% - 99%)    Neurological Exam:   At hour of sleep   Patient awake alert oriented to self place  L AKA  Grossly non focal  No events this morning.      Medications:  MEDICATIONS  (STANDING):  cefTAZidime  IVPB 1 Gram(s) IV Intermittent every 8 hours  chlorhexidine 2% Cloths 1 Application(s) Topical <User Schedule>  heparin   Injectable 5000 Unit(s) SubCutaneous every 12 hours  lacosamide IVPB 200 milliGRAM(s) IV Intermittent every 12 hours  levETIRAcetam 500 milliGRAM(s) Oral two times a day  senna 2 Tablet(s) Oral at bedtime      Labs:  CBC Full  -  ( 04 Feb 2022 04:30 )  WBC Count : 6.34 K/uL  RBC Count : 3.90 M/uL  Hemoglobin : 11.6 g/dL  Hematocrit : 36.3 %  Platelet Count - Automated : 143 K/uL  Mean Cell Volume : 93.1 fL  Mean Cell Hemoglobin : 29.7 pg  Mean Cell Hemoglobin Concentration : 32.0 g/dL  Auto Neutrophil # : x  Auto Lymphocyte # : x  Auto Monocyte # : x  Auto Eosinophil # : x  Auto Basophil # : x  Auto Neutrophil % : x  Auto Lymphocyte % : x  Auto Monocyte % : x  Auto Eosinophil % : x  Auto Basophil % : x    02-04    134<L>  |  102  |  10  ----------------------------<  100<H>  3.3<L>   |  23  |  0.8    Ca    9.2      04 Feb 2022 04:30  Phos  2.2     02-04  Mg     2.2     02-04    TPro  5.8<L>  /  Alb  3.4<L>  /  TBili  0.6  /  DBili  x   /  AST  13  /  ALT  10  /  AlkPhos  89  02-04    LIVER FUNCTIONS - ( 04 Feb 2022 04:30 )  Alb: 3.4 g/dL / Pro: 5.8 g/dL / ALK PHOS: 89 U/L / ALT: 10 U/L / AST: 13 U/L / GGT: x             VEEG in the last 24 hours:    Background------------ continues  , less than optimally organized reaching frequencies in the range of 6-7 Hz    Focal and generalized slowing-   ---- 1-mild to moderate generalized slowing.  2- moderate right hemispheric and mid to moderate bioccipital/posterior quadrant focal slowing right > left    Interictal activity----------   frequent right FT sharps on occasions appear in psuedo-periodic pattern  and moderate number of independent  left or bilateral synchronous occipital  sharp waves     Events------------   none    Seizures---------  none    Impression:  abnormal as above    Plan - as/neurology team          Electronic Signatures:  Hansel Peña)  (Signed 04-Feb-2022 10:31)  	Authored: EEG REPORT

## 2022-02-05 NOTE — CONSULT NOTE ADULT - TIME BILLING
Review of imaging and chart; obtaining history; examination of pt; discussion and coordination of care.
I have personally seen and examined this patient.    I have reviewed all pertinent clinical information and reviewed all relevant imaging and diagnostic studies personally.   I counseled the patient about diagnostic testing and treatment plan. All questions were answered.   I discussed recommendations with the primary team.
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
## 2022-02-05 NOTE — DIETITIAN INITIAL EVALUATION ADULT. - ADD RECOMMEND
1) Add DASH/TLC to diet order 2) Recommend Ensure Enlive - Vanilla Flavor TID 3) Obtain new weight; Patient with moderate PCM; RD to f/u in 4 days.

## 2022-02-05 NOTE — DIETITIAN INITIAL EVALUATION ADULT. - RD TO REMAIN AVAILABLE
Interventions: medical food supplements, coordination of care; Monitoring and Evaluation: diet order, energy intake, weight, labs (see above), NFPF/yes

## 2022-02-05 NOTE — DIETITIAN INITIAL EVALUATION ADULT. - PERTINENT MEDS FT
MEDICATIONS  (STANDING):  cefTRIAXone   IVPB 2000 milliGRAM(s) IV Intermittent every 24 hours  chlorhexidine 2% Cloths 1 Application(s) Topical <User Schedule>  heparin   Injectable 5000 Unit(s) SubCutaneous every 12 hours  lacosamide IVPB 200 milliGRAM(s) IV Intermittent every 12 hours  levETIRAcetam 500 milliGRAM(s) Oral two times a day  senna 2 Tablet(s) Oral at bedtime    MEDICATIONS  (PRN):  LORazepam   Injectable 2 milliGRAM(s) IV Push once PRN seizure

## 2022-02-05 NOTE — DIETITIAN INITIAL EVALUATION ADULT. - PERTINENT LABORATORY DATA
1/5: H/H 12.6/39.7, Na 143, K+4.2, Chloride 4.8, BUN 9 (L), Cr 0.8 , Gluc 100 (H), Ca 10.1 (H), Ca 10.1, Alb 4.1, Alk Phos 103, AST/SGOT 12, ALT/SGPT 11, Mg 1.9    11/26/21: HgbA1c 5.7 (H)

## 2022-02-05 NOTE — PROGRESS NOTE ADULT - SUBJECTIVE AND OBJECTIVE BOX
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 Code stroke was initially called, no tpa or intervention. Imaging negative. Family states pt admitted for seizures in November December and January. Son Mr. Velasquez states previous seizures are noted to have the same symptoms as patient presented with today, Left arm "heaviness", "looking to the left." Pt takes vimpat 75 mg BID at home. Pt admitted to Neuro ICU for seizure management and fever workup.     Overnight events   Pt disoriented overnight requiring restraints    Subjective complaints   No acute complaints    Present Today:   - Horowitz:  No [  ], Yes [   ] : Indication:     - Type of IV Access:       .. CVC/Piccline:  No [  ], Yes [   ] : Indication:       .. Midline: No [  ], Yes [   ] : Indication:                               OBJECTIVE  PAST MEDICAL & SURGICAL HISTORY  Hypertension    Dementia    Chronic GERD    History of TIA (transient ischemic attack)    Seizure disorder    Folate deficiency    Vitamin B12 deficiency    S/P AKA (above knee amputation), left    H/O left nephrectomy                                             ALLERGIES:  adhesives (Unknown)  valproic acid (Unknown)                                        HOME MEDICATIONS  Home Medications:  folic acid 1 mg oral tablet: 1 tab(s) orally once a day (02 Feb 2022 18:24)  Lipitor 40 mg oral tablet: 1 tab(s) orally once a day (02 Feb 2022 18:24)  Metoprolol Tartrate 25 mg oral tablet: 1 tab(s) orally once a day (02 Feb 2022 18:24)  Norvasc 5 mg oral tablet: 1 tab(s) orally once a day (02 Feb 2022 18:24)                           MEDICATIONS:  STANDING MEDICATIONS  cefTRIAXone   IVPB 2000 milliGRAM(s) IV Intermittent every 24 hours  chlorhexidine 2% Cloths 1 Application(s) Topical <User Schedule>  heparin   Injectable 5000 Unit(s) SubCutaneous every 12 hours  lacosamide IVPB 200 milliGRAM(s) IV Intermittent every 12 hours  levETIRAcetam 500 milliGRAM(s) Oral two times a day  senna 2 Tablet(s) Oral at bedtime    PRN MEDICATIONS  LORazepam   Injectable 2 milliGRAM(s) IV Push once PRN                                            VITAL SIGNS: Last 24 Hours  T(C): 36.2 (05 Feb 2022 06:14), Max: 36.9 (04 Feb 2022 20:32)  T(F): 97.2 (05 Feb 2022 06:14), Max: 98.4 (04 Feb 2022 20:32)  HR: 102 (05 Feb 2022 06:14) (88 - 102)  BP: 130/70 (05 Feb 2022 06:14) (121/56 - 130/70)  BP(mean): 88 (04 Feb 2022 13:32) (88 - 88)  RR: 17 (05 Feb 2022 06:14) (17 - 18)  SpO2: 99% (04 Feb 2022 20:32) (97% - 99%)      02-04-22 @ 07:01  -  02-05-22 @ 07:00  --------------------------------------------------------  IN: 0 mL / OUT: 600 mL / NET: -600 mL    02-05-22 @ 07:01  -  02-05-22 @ 12:29  --------------------------------------------------------  IN: 0 mL / OUT: 550 mL / NET: -550 mL                                               LABS:                        12.6   5.66  )-----------( 181      ( 05 Feb 2022 07:18 )             39.7     02-05    143  |  108  |  9<L>  ----------------------------<  100<H>  4.2   |  24  |  0.8    Ca    10.1      05 Feb 2022 07:18  Phos  2.2     02-04  Mg     1.9     02-05    TPro  6.7  /  Alb  4.1  /  TBili  0.6  /  DBili  x   /  AST  12  /  ALT  11  /  AlkPhos  103  02-05                Culture - Blood (collected 02 Feb 2022 13:45)  Source: .Blood Blood-Peripheral  Gram Stain (03 Feb 2022 09:13):    Growth in aerobic bottle: Gram Negative Rods  Preliminary Report (04 Feb 2022 12:06):    Growth in aerobic bottle: Escherichia coli    ***Blood Panel PCR results on this specimen are available    approximately 3 hours after the Gram stain result.***    Gram stain, PCR, and/or culture results may not always    correspond due to difference in methodologies.    ************************************************************    This PCR assay was performed by multiplex PCR. This    Assay tests for 66 bacterial and resistance gene targets.    Please refer to the [facility] test directory    at [email or web address] for details.  Organism: Blood Culture PCR (03 Feb 2022 11:41)  Organism: Blood Culture PCR (03 Feb 2022 11:41)    Culture - Blood (collected 02 Feb 2022 13:45)  Source: .Blood Blood-Peripheral  Preliminary Report (03 Feb 2022 22:02):    No growth to date.    Culture - Urine (collected 02 Feb 2022 12:45)  Source: Catheterized Catheterized  Final Report (05 Feb 2022 08:24):    >100,000 CFU/ml Escherichia coli  Organism: Escherichia coli (05 Feb 2022 08:24)  Organism: Escherichia coli (05 Feb 2022 08:24)                                              -------------------------------------------------------------  RADIOLOGY:                                            --------------------------------------------------------------    PHYSICAL EXAM:  General: NAD, sitting in bed comfortably  HEENT: atraumatic, normocephalic, EEG monitor/electrodes in place  LUNGS: CTAB, unlabored respirations  HEART: S1S2+, RRR  ABDOMEN: BS+, soft, nontender, nondistended  EXT: no clubbing, cyanosis or edema  NEURO: AAOx1  SKIN: no rashes or lesions

## 2022-02-06 NOTE — PROGRESS NOTE ADULT - ATTENDING COMMENTS
I have personally seen and examined this patient.  I have fully participated in the care of this patient.  I have reviewed all pertinent clinical information, including history, physical exam, plan and note.   I have reviewed all pertinent clinical information and reviewed all relevant imaging and diagnostic studies personally.  PT without seizure activity overnight and VEEG improved.  Family refusing keppra so pt currently on vimpat monotherapy.  Recommend continue quetiapine 25 mg in AM and 100 mg QHS.  Continue vimpat 200 BID.  Continue VEEG for now.  Recommendations as above.  Agree with above assessment except as noted.

## 2022-02-06 NOTE — PROGRESS NOTE ADULT - SUBJECTIVE AND OBJECTIVE BOX
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 Code stroke was initially called, no tpa or intervention. Imaging negative. Family states pt admitted for seizures in November December and January. Son Mr. Velasquez states previous seizures are noted to have the same symptoms as patient presented with today, Left arm "heaviness", "looking to the left." Pt takes vimpat 75 mg BID at home.  (02 Feb 2022 17:58)        Vital Signs Last 24 Hrs  T(C): 36.9 (05 Feb 2022 20:28), Max: 37 (05 Feb 2022 13:27)  T(F): 98.5 (05 Feb 2022 20:28), Max: 98.6 (05 Feb 2022 13:27)  HR: 104 (05 Feb 2022 20:28) (96 - 104)  BP: 125/64 (05 Feb 2022 20:28) (112/57 - 130/70)  BP(mean): --  RR: 18 (05 Feb 2022 20:28) (17 - 18)  SpO2: --    Neurological Exam:   At hour of sleep   Patient awake alert oriented to self place  L AKA  Grossly non focal  No events this morning.        Medications:  MEDICATIONS  (STANDING):  cefTRIAXone   IVPB 2000 milliGRAM(s) IV Intermittent every 24 hours  chlorhexidine 2% Cloths 1 Application(s) Topical <User Schedule>  gabapentin 100 milliGRAM(s) Oral three times a day  heparin   Injectable 5000 Unit(s) SubCutaneous every 12 hours  lacosamide IVPB 200 milliGRAM(s) IV Intermittent every 12 hours  levETIRAcetam 500 milliGRAM(s) Oral two times a day  QUEtiapine 100 milliGRAM(s) Oral at bedtime  QUEtiapine 25 milliGRAM(s) Oral daily  senna 2 Tablet(s) Oral at bedtime  tamsulosin 0.4 milliGRAM(s) Oral at bedtime  vancomycin    Solution 125 milliGRAM(s) Oral every 12 hours      Labs:  CBC Full  -  ( 05 Feb 2022 07:18 )  WBC Count : 5.66 K/uL  RBC Count : 4.24 M/uL  Hemoglobin : 12.6 g/dL  Hematocrit : 39.7 %  Platelet Count - Automated : 181 K/uL  Mean Cell Volume : 93.6 fL  Mean Cell Hemoglobin : 29.7 pg  Mean Cell Hemoglobin Concentration : 31.7 g/dL  Auto Neutrophil # : x  Auto Lymphocyte # : x  Auto Monocyte # : x  Auto Eosinophil # : x  Auto Basophil # : x  Auto Neutrophil % : x  Auto Lymphocyte % : x  Auto Monocyte % : x  Auto Eosinophil % : x  Auto Basophil % : x    02-05    143  |  108  |  9<L>  ----------------------------<  100<H>  4.2   |  24  |  0.8    Ca    10.1      05 Feb 2022 07:18  Phos  2.2     02-04  Mg     1.9     02-05    TPro  6.7  /  Alb  4.1  /  TBili  0.6  /  DBili  x   /  AST  12  /  ALT  11  /  AlkPhos  103  02-05    LIVER FUNCTIONS - ( 05 Feb 2022 07:18 )  Alb: 4.1 g/dL / Pro: 6.7 g/dL / ALK PHOS: 103 U/L / ALT: 11 U/L / AST: 12 U/L / GGT: x             VEEG in the last 24 hours:    Background------------ continues  , less than optimally organized reaching frequencies in the range of 6-7 Hz    Focal and generalized slowing-   ---- 1-mild to moderate generalized slowing.  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 Code stroke was initially called, no tpa or intervention. Imaging negative. Family states pt admitted for seizures in November December and January. Son Mr. Velasquez states previous seizures are noted to have the same symptoms as patient presented with today, Left arm "heaviness", "looking to the left." Pt takes vimpat 75 mg BID at home.  Pt son refusing keppra to be given since pt had increased somnolence in the past after taking keppra.          Vital Signs Last 24 Hrs  T(C): 36.9 (05 Feb 2022 20:28), Max: 37 (05 Feb 2022 13:27)  T(F): 98.5 (05 Feb 2022 20:28), Max: 98.6 (05 Feb 2022 13:27)  HR: 104 (05 Feb 2022 20:28) (96 - 104)  BP: 125/64 (05 Feb 2022 20:28) (112/57 - 130/70)  BP(mean): --  RR: 18 (05 Feb 2022 20:28) (17 - 18)  SpO2: --    Neurological Exam:   At hour of sleep   Patient awake alert oriented to self place  L AKA  Grossly non focal  No events this morning.      MEDICATIONS  (STANDING):  cefTRIAXone   IVPB 2000 milliGRAM(s) IV Intermittent every 24 hours  chlorhexidine 2% Cloths 1 Application(s) Topical <User Schedule>  gabapentin 100 milliGRAM(s) Oral three times a day  heparin   Injectable 5000 Unit(s) SubCutaneous every 12 hours  lacosamide IVPB 200 milliGRAM(s) IV Intermittent every 12 hours  metoprolol tartrate 25 milliGRAM(s) Oral two times a day  QUEtiapine 100 milliGRAM(s) Oral at bedtime  QUEtiapine 25 milliGRAM(s) Oral daily  senna 2 Tablet(s) Oral at bedtime  tamsulosin 0.4 milliGRAM(s) Oral at bedtime  vancomycin    Solution 125 milliGRAM(s) Oral every 12 hours      Labs:  CBC Full  -  ( 05 Feb 2022 07:18 )  WBC Count : 5.66 K/uL  RBC Count : 4.24 M/uL  Hemoglobin : 12.6 g/dL  Hematocrit : 39.7 %  Platelet Count - Automated : 181 K/uL  Mean Cell Volume : 93.6 fL  Mean Cell Hemoglobin : 29.7 pg  Mean Cell Hemoglobin Concentration : 31.7 g/dL  Auto Neutrophil # : x  Auto Lymphocyte # : x  Auto Monocyte # : x  Auto Eosinophil # : x  Auto Basophil # : x  Auto Neutrophil % : x  Auto Lymphocyte % : x  Auto Monocyte % : x  Auto Eosinophil % : x  Auto Basophil % : x    02-05    143  |  108  |  9<L>  ----------------------------<  100<H>  4.2   |  24  |  0.8    Ca    10.1      05 Feb 2022 07:18  Phos  2.2     02-04  Mg     1.9     02-05    TPro  6.7  /  Alb  4.1  /  TBili  0.6  /  DBili  x   /  AST  12  /  ALT  11  /  AlkPhos  103  02-05    LIVER FUNCTIONS - ( 05 Feb 2022 07:18 )  Alb: 4.1 g/dL / Pro: 6.7 g/dL / ALK PHOS: 103 U/L / ALT: 11 U/L / AST: 12 U/L / GGT: x             VEEG in the last 24 hours:    Background------------ continues  , less than optimally organized reaching frequencies in the range of 6-7 Hz    Focal and generalized slowing-   ---- 1-mild to moderate generalized slowing.  2- moderate right hemispheric and mid to moderate bioccipital/posterior quadrant focal slowing right > left    Interictal activity----------   frequent right FT sharps on occasions appear in psuedo-periodic pattern  and moderate number of independent  left or bilateral synchronous occipital  sharp waves     Events------------   none    Seizures---------  none    Impression:  abnormal as above

## 2022-02-06 NOTE — PROGRESS NOTE ADULT - SUBJECTIVE AND OBJECTIVE BOX
Pt seen and examined w/ .    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 Code stroke was initially called, no tpa or intervention. Imaging negative. Family states pt admitted for seizures in November December and January. Son Mr. Velasquez states previous seizures are noted to have the same symptoms as patient presented with today, Left arm "heaviness", "looking to the left." Pt takes vimpat 75 mg BID at home.  (02 Feb 2022 17:58)    S: Patient was examined and seen at bedside. This morning pt is resting comfortably in bed and reports no new issues or overnight events. No complaints, feels well. Poor historian. Family refused Keppra yesterday as it made her lethargic in the past.  Denies CP, SOB, N/V/D/C/AP, cough, F, chills, dizziness, new focal weakness, HA, vision changes, dysuria, or urinary symptoms, blood in stool.  ROS: all other systems reviewed and are negative    PAST MEDICAL & SURGICAL HISTORY:  Hypertension    Dementia    Chronic GERD    History of TIA (transient ischemic attack)    Seizure disorder    Folate deficiency    Vitamin B12 deficiency    S/P AKA (above knee amputation), left    H/O left nephrectomy    General: NAD. Looks stated age.  HEENT: clean oropharynx, EOMI, no LAD  Neck: trachea midline, no thyromegaly  CV: nl S1 S2; no m/r/g  Resp: decreased breath sounds at base  GI: NT/ND/S +BS  MS: no clubbing/cyanosis/edema, +Lt AKA  Neuro: motor, sensory intact; + reflexes  Skin: no rashes, nl turgor  Psychiatric: AA0x1+ w/ poor insight and judgement            MEDICATIONS  (STANDING):  cefTRIAXone   IVPB 2000 milliGRAM(s) IV Intermittent every 24 hours  chlorhexidine 2% Cloths 1 Application(s) Topical <User Schedule>  gabapentin 100 milliGRAM(s) Oral three times a day  heparin   Injectable 5000 Unit(s) SubCutaneous every 12 hours  lacosamide IVPB 200 milliGRAM(s) IV Intermittent every 12 hours  QUEtiapine 100 milliGRAM(s) Oral at bedtime  QUEtiapine 25 milliGRAM(s) Oral daily  senna 2 Tablet(s) Oral at bedtime  tamsulosin 0.4 milliGRAM(s) Oral at bedtime  vancomycin    Solution 125 milliGRAM(s) Oral every 12 hours    MEDICATIONS  (PRN):  acetaminophen     Tablet .. 650 milliGRAM(s) Oral every 6 hours PRN Moderate Pain (4 - 6)  LORazepam   Injectable 2 milliGRAM(s) IV Push once PRN seizure    Home Medications:  folic acid 1 mg oral tablet: 1 tab(s) orally once a day (02 Feb 2022 18:24)  Lipitor 40 mg oral tablet: 1 tab(s) orally once a day (02 Feb 2022 18:24)  Metoprolol Tartrate 25 mg oral tablet: 1 tab(s) orally once a day (02 Feb 2022 18:24)  Norvasc 5 mg oral tablet: 1 tab(s) orally once a day (02 Feb 2022 18:24)    Vital Signs Last 24 Hrs  T(C): 36.1 (06 Feb 2022 13:30), Max: 37.1 (06 Feb 2022 05:42)  T(F): 96.9 (06 Feb 2022 13:30), Max: 98.7 (06 Feb 2022 05:42)  HR: 73 (06 Feb 2022 05:42) (73 - 104)  BP: 142/94 (06 Feb 2022 13:30) (104/56 - 142/94)  BP(mean): 73 (06 Feb 2022 05:42) (73 - 73)  RR: 18 (06 Feb 2022 13:30) (18 - 18)  SpO2: --  CAPILLARY BLOOD GLUCOSE        LABS:                        13.5   4.65  )-----------( 138      ( 06 Feb 2022 08:33 )             42.3     02-06    142  |  104  |  24<H>  ----------------------------<  126<H>  4.4   |  21  |  1.1    Ca    10.5<H>      06 Feb 2022 04:30  Mg     2.0     02-06    TPro  6.9  /  Alb  3.9  /  TBili  0.4  /  DBili  x   /  AST  16  /  ALT  11  /  AlkPhos  113  02-06    LIVER FUNCTIONS - ( 06 Feb 2022 04:30 )  Alb: 3.9 g/dL / Pro: 6.9 g/dL / ALK PHOS: 113 U/L / ALT: 11 U/L / AST: 16 U/L / GGT: x                           Consultant Notes Reviewed:  [x ] YES  [ ] NO  Care Discussed with Consultants/Other Providers/ Housestaff [ x] YES  [ ] NO  Radiology, labs, new studies personally reviewed.

## 2022-02-06 NOTE — CONSULT NOTE ADULT - ASSESSMENT
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 Code stroke was initially called, no tpa or intervention. Imaging negative. Family states pt admitted for seizures in November December and January. Currently asymptomatic, VEEG monitoring continuously, frequent sharps on VEEG in last 24 hours.. Discussed patients care at length with son this evening.     Suggestion:  -Continue VEEG  -Continue Vimpat patients son refuses keppra because of previous   lethargy or unresponsiveness. Discussed with Dr. Lamb.  -Continue management of infectious process.   -Seizure precautions  -Keep magnesium >2  -Continue queitipine

## 2022-02-06 NOTE — PROGRESS NOTE ADULT - ASSESSMENT
Impression:  Impression:  This is a 77 yo F with PMHx of Alzheimer's dementia, 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 Code stroke was initially called, no tpa or intervention. Imaging negative. Family states pt admitted for seizures in November December and January. Currently asymptomatic, VEEG monitoring continuously. Discussed patients care at length with son. Patients son refuses Keppra for previous episodes of unresponsiveness. JAMAL Rodriguez, Zainab.    Suggestion:  -Continue VEEG  -Continue Vimpat  -Continue management of infectious process.   -Seizure precautions  -Keep magnesium >2  -Continue quetiapine

## 2022-02-07 NOTE — PROGRESS NOTE ADULT - SUBJECTIVE AND OBJECTIVE BOX
----------Daily Progress Note----------    HISTORY OF PRESENT ILLNESS:  Patient is a 76y old Female who presents with a chief complaint of Seizures (07 Feb 2022 10:37)    Currently admitted to medicine with the primary diagnosis of Status epilepticus       Today is hospital day 5d.     INTERVAL HOSPITAL COURSE / OVERNIGHT EVENTS:    Patient was examined and seen at bedside. Not complaining of chest pain, SOB, nausea, vomiting.     Review of Systems: Otherwise unremarkable     <<<<<PAST MEDICAL & SURGICAL HISTORY>>>>>  Hypertension    Dementia    Chronic GERD    History of TIA (transient ischemic attack)    Seizure disorder    Folate deficiency    Vitamin B12 deficiency    S/P AKA (above knee amputation), left    H/O left nephrectomy      ALLERGIES  adhesives (Unknown)  valproic acid (Unknown)      Home Medications:  folic acid 1 mg oral tablet: 1 tab(s) orally once a day (02 Feb 2022 18:24)  Lipitor 40 mg oral tablet: 1 tab(s) orally once a day (02 Feb 2022 18:24)  Metoprolol Tartrate 25 mg oral tablet: 1 tab(s) orally once a day (02 Feb 2022 18:24)  Norvasc 5 mg oral tablet: 1 tab(s) orally once a day (02 Feb 2022 18:24)        MEDICATIONS  STANDING MEDICATIONS  cefTRIAXone   IVPB 2000 milliGRAM(s) IV Intermittent every 24 hours  chlorhexidine 2% Cloths 1 Application(s) Topical <User Schedule>  gabapentin 100 milliGRAM(s) Oral three times a day  heparin   Injectable 5000 Unit(s) SubCutaneous every 12 hours  lacosamide IVPB 200 milliGRAM(s) IV Intermittent every 12 hours  lactobacillus acidophilus 1 Tablet(s) Oral daily  metoprolol tartrate 25 milliGRAM(s) Oral two times a day  QUEtiapine 100 milliGRAM(s) Oral at bedtime  QUEtiapine 25 milliGRAM(s) Oral daily  senna 2 Tablet(s) Oral at bedtime  tamsulosin 0.4 milliGRAM(s) Oral at bedtime  vancomycin    Solution 125 milliGRAM(s) Oral every 12 hours    PRN MEDICATIONS  acetaminophen     Tablet .. 650 milliGRAM(s) Oral every 6 hours PRN  LORazepam   Injectable 2 milliGRAM(s) IV Push once PRN    VITALS:  T(F): 97.5  HR: 108  BP: 116/56  RR: 18  SpO2: 97%    <<<<<LABS>>>>>                        11.2   6.02  )-----------( 163      ( 07 Feb 2022 04:30 )             34.4     02-07    142  |  106  |  22<H>  ----------------------------<  111<H>  4.1   |  24  |  0.8    Ca    10.2<H>      07 Feb 2022 04:30  Mg     2.0     02-07    TPro  5.8<L>  /  Alb  3.5  /  TBili  0.3  /  DBili  x   /  AST  15  /  ALT  12  /  AlkPhos  106  02-07            [id number]        <<<<<RADIOLOGY>>>>>    < from: CT Brain Stroke Protocol (02.02.22 @ 12:16) >    PROCEDURE DATE:  02/02/2022          INTERPRETATION:  CLINICAL INDICATION: Stroke code. seizure left-sided   gaze.    TECHNIQUE: CT of the head was performed without the administration of   intravenous contrast.    COMPARISON: CT head dated 1/19/2022    FINDINGS:    There is prominence of the sulci, sylvian fissures, and ventricles,   reflecting stable mild diffuse parenchymal volume loss.    There are scattered patchy low attenuations in the bilateral   periventricular cerebral white matter consistent with stable severe   chronic microvascular ischemic changes.    Beam hardening artifact is noted overlying the brain stem and posterior   fossa which is inherent to CT in this location. Otherwise, there is no   evidence of acute territorial infarct or intracranial hemorrhage. There   is no space-occupying lesion or midline shift.    There is no evidence of hydrocephalus. There are no extra-axial fluid   collections.    Status post bilateral cataract surgery, stable. The imaged portions of   the paranasal sinuses are aerated.The mastoid air cells are aerated. The   visualized soft tissues and osseous structures appear normal. Partially   visualized parotid glands are normal.      IMPRESSION:    No acute intracranial pathology, stable exam since 1/18/2022.    Stable severe chronic microvascular ischemic changes. Recommend further   evaluation with MRI if there is continued clinical concern for   superimposed small acute infarct.    Communication: The summary of above findings were discussed with readback   confirmation with TORSTEN Ramesh by radiologist Dr. Lemus on 2/2/2022 at 12:22   PM.    < end of copied text >      <<<<<PHYSICAL EXAM>>>>>  GENERAL: resting comfortably in bed  PULMONARY: decreased bibasilar breath sounds  CARDIOVASCULAR: Regular rate and rhythm, S1-S2, no murmurs  GASTROINTESTINAL: Soft, non-tender, non-distended, no guarding.  NEUROLOGIC: [id number]      -----------------------------------------------------------------------------------------------------------------------------------------------------------------------------------------------

## 2022-02-07 NOTE — PROGRESS NOTE ADULT - SUBJECTIVE AND OBJECTIVE BOX
THIS IS AN INCOMPLETE NOTE . 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 Code stroke was initially called, no tpa or intervention. Imaging negative. Family states pt admitted for seizures in November December and January. Son Mr. Velasquez states previous seizures are noted to have the same symptoms as patient presented with today, Left arm "heaviness", "looking to the left." Pt takes vimpat 75 mg BID at home.  (02 Feb 2022 17:58)    REVIEW OF SYSTEMS:  As states in HPI.    Medications:  acetaminophen     Tablet .. 650 milliGRAM(s) Oral every 6 hours PRN  acetaminophen  Suppository .. 975 milliGRAM(s) Rectal once  cefTRIAXone   IVPB 1000 milliGRAM(s) IV Intermittent once  cefTRIAXone   IVPB 2000 milliGRAM(s) IV Intermittent every 24 hours  chlorhexidine 2% Cloths 1 Application(s) Topical <User Schedule>  gabapentin 100 milliGRAM(s) Oral three times a day  heparin   Injectable 5000 Unit(s) SubCutaneous every 12 hours  lacosamide IVPB 200 milliGRAM(s) IV Intermittent every 12 hours  lacosamide IVPB 50 milliGRAM(s) IV Intermittent once  lactobacillus acidophilus 1 Tablet(s) Oral daily  levETIRAcetam  IVPB 2000 milliGRAM(s) IV Intermittent once  LORazepam   Injectable 2 milliGRAM(s) IV Push once  LORazepam   Injectable 2 milliGRAM(s) IV Push once  LORazepam   Injectable 2 milliGRAM(s) IV Push once PRN  magnesium sulfate  IVPB 2 Gram(s) IV Intermittent once  metoprolol tartrate 25 milliGRAM(s) Oral two times a day  potassium chloride    Tablet ER 20 milliEquivalent(s) Oral every 2 hours  potassium chloride   Powder 40 milliEquivalent(s) Oral once  potassium chloride  20 mEq/100 mL IVPB 20 milliEquivalent(s) IV Intermittent once  QUEtiapine 100 milliGRAM(s) Oral at bedtime  QUEtiapine 25 milliGRAM(s) Oral daily  senna 2 Tablet(s) Oral at bedtime  sodium chloride 0.9% Bolus 500 milliLiter(s) IV Bolus once  tamsulosin 0.4 milliGRAM(s) Oral at bedtime  vancomycin    Solution 125 milliGRAM(s) Oral every 12 hours  vancomycin  IVPB 750 milliGRAM(s) IV Intermittent once    Vitals:  T(C): 36.4 (02-07-22 @ 04:55), Max: 37.3 (02-06-22 @ 20:06)  HR: 108 (02-07-22 @ 04:55) (101 - 130)  BP: 116/56 (02-07-22 @ 04:55) (111/59 - 142/94)  RR: 18 (02-07-22 @ 04:55) (18 - 20)  SpO2: 97% (02-06-22 @ 13:56) (97% - 97%)    Labs:                        11.2   6.02  )-----------( 163      ( 07 Feb 2022 04:30 )             34.4     02-07    142  |  106  |  22<H>  ----------------------------<  111<H>  4.1   |  24  |  0.8    Ca    10.2<H>      07 Feb 2022 04:30  Mg     2.0     02-07  Magnesium, Serum: 2.0 mg/dL [1.8 - 2.4] (02-07-22 @ 04:30)      TPro  5.8<L>  /  Alb  3.5  /  TBili  0.3  /  DBili  x   /  AST  15  /  ALT  12  /  AlkPhos  106  02-07    CAPILLARY BLOOD GLUCOSE      LIVER FUNCTIONS - ( 07 Feb 2022 04:30 )  Alb: 3.5 g/dL / Pro: 5.8 g/dL / ALK PHOS: 106 U/L / ALT: 12 U/L / AST: 15 U/L / GGT: x               CSF:              PHYSICAL EXAMINATION:  Mental status: Awake, alert and oriented x3. No dysarthria, no aphasia. Follows commands.  Cranial nerves: Pupils equally round and reactive to light, extraocular muscles intact, V1 through V3 intact bilaterally and symmetric, face symmetric, tongue was midline.  Motor: Moves all 3 extremities anti-gravity. L AKA. Normal tone and bulk.  No abnormal movements.    Sensation: Intact to light touch, pain, temperature and vibration.  Coordination: No dysmetria on finger-to-nose and heel-to-shin.   Reflexes: 2+ in bilateral UE/LE, downgoing toes bilaterally.     Radiology:  - MR Brain: ordered 2/3, not performed  THIS IS AN INCOMPLETE NOTE . FULL NOTE TO FOLLOW SHORTLY       Neurology  Progress Note  02-07-22    Name:  DANISHA JIMÉNEZ; 76y    Interval History:    HPI:  This is a 77 yo F with PMHx of Alzheimer's dementia, 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 Code stroke was initially called, no tpa or intervention. Imaging negative. Family states pt admitted for seizures in November December and January. Son Mr. Velasquez states previous seizures are noted to have the same symptoms as patient presented with today, Left arm "heaviness", "looking to the left." Pt takes vimpat 75 mg BID at home.  (02 Feb 2022 17:58)    REVIEW OF SYSTEMS:  As states in HPI.    Medications:  acetaminophen     Tablet .. 650 milliGRAM(s) Oral every 6 hours PRN  acetaminophen  Suppository .. 975 milliGRAM(s) Rectal once  cefTRIAXone   IVPB 1000 milliGRAM(s) IV Intermittent once  cefTRIAXone   IVPB 2000 milliGRAM(s) IV Intermittent every 24 hours  chlorhexidine 2% Cloths 1 Application(s) Topical <User Schedule>  gabapentin 100 milliGRAM(s) Oral three times a day  heparin   Injectable 5000 Unit(s) SubCutaneous every 12 hours  lacosamide IVPB 200 milliGRAM(s) IV Intermittent every 12 hours  lacosamide IVPB 50 milliGRAM(s) IV Intermittent once  lactobacillus acidophilus 1 Tablet(s) Oral daily  levETIRAcetam  IVPB 2000 milliGRAM(s) IV Intermittent once  LORazepam   Injectable 2 milliGRAM(s) IV Push once  LORazepam   Injectable 2 milliGRAM(s) IV Push once  LORazepam   Injectable 2 milliGRAM(s) IV Push once PRN  magnesium sulfate  IVPB 2 Gram(s) IV Intermittent once  metoprolol tartrate 25 milliGRAM(s) Oral two times a day  potassium chloride    Tablet ER 20 milliEquivalent(s) Oral every 2 hours  potassium chloride   Powder 40 milliEquivalent(s) Oral once  potassium chloride  20 mEq/100 mL IVPB 20 milliEquivalent(s) IV Intermittent once  QUEtiapine 100 milliGRAM(s) Oral at bedtime  QUEtiapine 25 milliGRAM(s) Oral daily  senna 2 Tablet(s) Oral at bedtime  sodium chloride 0.9% Bolus 500 milliLiter(s) IV Bolus once  tamsulosin 0.4 milliGRAM(s) Oral at bedtime  vancomycin    Solution 125 milliGRAM(s) Oral every 12 hours  vancomycin  IVPB 750 milliGRAM(s) IV Intermittent once    Vitals:  T(C): 36.4 (02-07-22 @ 04:55), Max: 37.3 (02-06-22 @ 20:06)  HR: 108 (02-07-22 @ 04:55) (101 - 130)  BP: 116/56 (02-07-22 @ 04:55) (111/59 - 142/94)  RR: 18 (02-07-22 @ 04:55) (18 - 20)  SpO2: 97% (02-06-22 @ 13:56) (97% - 97%)    Labs:                        11.2   6.02  )-----------( 163      ( 07 Feb 2022 04:30 )             34.4     02-07    142  |  106  |  22<H>  ----------------------------<  111<H>  4.1   |  24  |  0.8    Ca    10.2<H>      07 Feb 2022 04:30  Mg     2.0     02-07    Trend Magnesium  02-07-22 @ 04:30  -  2.0  02-06-22 @ 04:30  -  2.0  02-05-22 @ 07:18  -  1.9      TPro  5.8<L>  /  Alb  3.5  /  TBili  0.3  /  DBili  x   /  AST  15  /  ALT  12  /  AlkPhos  106  02-07    CAPILLARY BLOOD GLUCOSE      LIVER FUNCTIONS - ( 07 Feb 2022 04:30 )  Alb: 3.5 g/dL / Pro: 5.8 g/dL / ALK PHOS: 106 U/L / ALT: 12 U/L / AST: 15 U/L / GGT: x               CSF:              PHYSICAL EXAMINATION:    VEEG in the last 24 hours: 2/7/22    Background--continues , less than optimally organized reaching 6-7 hz    Focal and generalized slowing---mild to moderate generalized slowing. Moderate bilateral right significantly more than left posterior quadrants focal slowing    Interictal activity---------independent Rt. >> left sharp waves and spike and aftergoing slow    Events-none    Seizures---none    Impression:   abnormal as above    Plan --- as/Neurology team        Cranial nerves: Pupils equally round and reactive to light, extraocular muscles intact, V1 through V3 intact bilaterally and symmetric, face symmetric, tongue was midline.  Motor: Moves all 3 extremities anti-gravity. L AKA. Normal tone and bulk.  No abnormal movements.    Sensation: Intact to light touch, pain, temperature and vibration.  Coordination: No dysmetria on finger-to-nose and heel-to-shin.   Reflexes: 2+ in bilateral UE/LE, downgoing toes bilaterally.             Radiology:    - MR Brain: ordered 2/3, not performed       < from: CT Head No Cont (01.18.22 @ 19:40) >  Impression:    No CT evidence of acute intracranial injury.    Severe chronic microvascular ischemic changes.    No significant change since 12/22/2021    < end of copied text >   Neurology  Progress Note  02-07-22    Name:  DANISHA JIMÉNEZ; 76y    Interval History: patient lying no bed, no acute distress, appears calm.    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 Code stroke was initially called, no tpa or intervention. Imaging negative. Family states pt admitted for seizures in November December and January. Son Mr. Velasquez states previous seizures are noted to have the same symptoms as patient presented with today, Left arm "heaviness", "looking to the left." Pt takes vimpat 75 mg BID at home.  (02 Feb 2022 17:58)    REVIEW OF SYSTEMS:  As states in HPI.    Medications:  acetaminophen     Tablet .. 650 milliGRAM(s) Oral every 6 hours PRN  acetaminophen  Suppository .. 975 milliGRAM(s) Rectal once  cefTRIAXone   IVPB 1000 milliGRAM(s) IV Intermittent once  cefTRIAXone   IVPB 2000 milliGRAM(s) IV Intermittent every 24 hours  chlorhexidine 2% Cloths 1 Application(s) Topical <User Schedule>  gabapentin 100 milliGRAM(s) Oral three times a day  heparin   Injectable 5000 Unit(s) SubCutaneous every 12 hours  lacosamide IVPB 200 milliGRAM(s) IV Intermittent every 12 hours  lacosamide IVPB 50 milliGRAM(s) IV Intermittent once  lactobacillus acidophilus 1 Tablet(s) Oral daily  levETIRAcetam  IVPB 2000 milliGRAM(s) IV Intermittent once  LORazepam   Injectable 2 milliGRAM(s) IV Push once  LORazepam   Injectable 2 milliGRAM(s) IV Push once  LORazepam   Injectable 2 milliGRAM(s) IV Push once PRN  magnesium sulfate  IVPB 2 Gram(s) IV Intermittent once  metoprolol tartrate 25 milliGRAM(s) Oral two times a day  potassium chloride    Tablet ER 20 milliEquivalent(s) Oral every 2 hours  potassium chloride   Powder 40 milliEquivalent(s) Oral once  potassium chloride  20 mEq/100 mL IVPB 20 milliEquivalent(s) IV Intermittent once  QUEtiapine 100 milliGRAM(s) Oral at bedtime  QUEtiapine 25 milliGRAM(s) Oral daily  senna 2 Tablet(s) Oral at bedtime  sodium chloride 0.9% Bolus 500 milliLiter(s) IV Bolus once  tamsulosin 0.4 milliGRAM(s) Oral at bedtime  vancomycin    Solution 125 milliGRAM(s) Oral every 12 hours  vancomycin  IVPB 750 milliGRAM(s) IV Intermittent once    Vitals:  T(C): 36.4 (02-07-22 @ 04:55), Max: 37.3 (02-06-22 @ 20:06)  HR: 108 (02-07-22 @ 04:55) (101 - 130)  BP: 116/56 (02-07-22 @ 04:55) (111/59 - 142/94)  RR: 18 (02-07-22 @ 04:55) (18 - 20)  SpO2: 97% (02-06-22 @ 13:56) (97% - 97%)    Labs:                        11.2   6.02  )-----------( 163      ( 07 Feb 2022 04:30 )             34.4     02-07    142  |  106  |  22<H>  ----------------------------<  111<H>  4.1   |  24  |  0.8    Ca    10.2<H>      07 Feb 2022 04:30  Mg     2.0     02-07    Trend Magnesium  02-07-22 @ 04:30  -  2.0  02-06-22 @ 04:30  -  2.0  02-05-22 @ 07:18  -  1.9      TPro  5.8<L>  /  Alb  3.5  /  TBili  0.3  /  DBili  x   /  AST  15  /  ALT  12  /  AlkPhos  106  02-07    CAPILLARY BLOOD GLUCOSE      LIVER FUNCTIONS - ( 07 Feb 2022 04:30 )  Alb: 3.5 g/dL / Pro: 5.8 g/dL / ALK PHOS: 106 U/L / ALT: 12 U/L / AST: 15 U/L / GGT: x               CSF:              PHYSICAL EXAMINATION:    VEEG in the last 24 hours: 2/7/22    Background--continues , less than optimally organized reaching 6-7 hz    Focal and generalized slowing---mild to moderate generalized slowing. Moderate bilateral right significantly more than left posterior quadrants focal slowing    Interictal activity---------independent Rt. >> left sharp waves and spike and aftergoing slow    Events-none    Seizures---none    Impression:   abnormal as above    Plan --- as/Neurology team        Cranial nerves: Pupils equally round and reactive to light, extraocular muscles intact, V1 through V3 intact bilaterally and symmetric, face symmetric, tongue was midline.  Motor: Moves all 3 extremities anti-gravity. L AKA. Normal tone and bulk.  No abnormal movements.    Sensation: Intact to light touch, pain, temperature and vibration.  Coordination: No dysmetria on finger-to-nose and heel-to-shin.   Reflexes: 2+ in bilateral UE/LE, downgoing toes bilaterally.             Radiology:    - MR Brain: ordered 2/3, not performed       < from: CT Head No Cont (01.18.22 @ 19:40) >  Impression:    No CT evidence of acute intracranial injury.    Severe chronic microvascular ischemic changes.    No significant change since 12/22/2021    < end of copied text >   Neurology  Progress Note  02-07-22    Name:  DANISHA JIMÉNEZ; 76y    Interval History: patient lying no bed, no acute distress, appears calm.    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 Code stroke was initially called, no tpa or intervention. Imaging negative. Family states pt admitted for seizures in November December and January. Son Mr. Velasquez states previous seizures are noted to have the same symptoms as patient presented with today, Left arm "heaviness", "looking to the left." Pt takes vimpat 75 mg BID at home.  (02 Feb 2022 17:58)    REVIEW OF SYSTEMS:  As states in HPI.      MEDICATIONS  (STANDING):  aspirin  chewable 81 milliGRAM(s) Oral daily  atorvastatin 40 milliGRAM(s) Oral at bedtime  cefTRIAXone   IVPB 2000 milliGRAM(s) IV Intermittent every 24 hours  chlorhexidine 2% Cloths 1 Application(s) Topical <User Schedule>  gabapentin 100 milliGRAM(s) Oral three times a day  heparin   Injectable 5000 Unit(s) SubCutaneous every 12 hours  lacosamide IVPB 200 milliGRAM(s) IV Intermittent every 12 hours  lactobacillus acidophilus 1 Tablet(s) Oral daily  metoprolol tartrate 25 milliGRAM(s) Oral two times a day  QUEtiapine 100 milliGRAM(s) Oral at bedtime  QUEtiapine 25 milliGRAM(s) Oral daily  senna 2 Tablet(s) Oral at bedtime  tamsulosin 0.4 milliGRAM(s) Oral at bedtime  vancomycin    Solution 125 milliGRAM(s) Oral every 12 hours      Vitals:  T(C): 36.4 (02-07-22 @ 04:55), Max: 37.3 (02-06-22 @ 20:06)  HR: 108 (02-07-22 @ 04:55) (101 - 130)  BP: 116/56 (02-07-22 @ 04:55) (111/59 - 142/94)  RR: 18 (02-07-22 @ 04:55) (18 - 20)  SpO2: 97% (02-06-22 @ 13:56) (97% - 97%)    Labs:                        11.2   6.02  )-----------( 163      ( 07 Feb 2022 04:30 )             34.4     02-07    142  |  106  |  22<H>  ----------------------------<  111<H>  4.1   |  24  |  0.8    Ca    10.2<H>      07 Feb 2022 04:30  Mg     2.0     02-07    Trend Magnesium  02-07-22 @ 04:30  -  2.0  02-06-22 @ 04:30  -  2.0  02-05-22 @ 07:18  -  1.9      TPro  5.8<L>  /  Alb  3.5  /  TBili  0.3  /  DBili  x   /  AST  15  /  ALT  12  /  AlkPhos  106  02-07    CAPILLARY BLOOD GLUCOSE      LIVER FUNCTIONS - ( 07 Feb 2022 04:30 )  Alb: 3.5 g/dL / Pro: 5.8 g/dL / ALK PHOS: 106 U/L / ALT: 12 U/L / AST: 15 U/L / GGT: x               CSF:              PHYSICAL EXAMINATION:    VEEG in the last 24 hours: 2/7/22    Background--continues , less than optimally organized reaching 6-7 hz    Focal and generalized slowing---mild to moderate generalized slowing. Moderate bilateral right significantly more than left posterior quadrants focal slowing    Interictal activity---------independent Rt. >> left sharp waves and spike and aftergoing slow    Events-none    Seizures---none    Impression:   abnormal as above    Plan --- as/Neurology team        Cranial nerves: Pupils equally round and reactive to light, extraocular muscles intact, V1 through V3 intact bilaterally and symmetric, face symmetric, tongue was midline.  Motor: Moves all 3 extremities anti-gravity. L AKA. Normal tone and bulk.  No abnormal movements.    Sensation: Intact to light touch, pain, temperature and vibration.  Coordination: No dysmetria on finger-to-nose and heel-to-shin.   Reflexes: 2+ in bilateral UE/LE, downgoing toes bilaterally.             Radiology:    - MR Brain: ordered 2/3, not performed       < from: CT Head No Cont (01.18.22 @ 19:40) >  Impression:    No CT evidence of acute intracranial injury.    Severe chronic microvascular ischemic changes.    No significant change since 12/22/2021    < end of copied text >
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
## 2022-02-07 NOTE — PROGRESS NOTE ADULT - SUBJECTIVE AND OBJECTIVE BOX
Pt seen and examined w/ .    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 Code stroke was initially called, no tpa or intervention. Imaging negative. Family states pt admitted for seizures in November December and January. Son Mr. Velasquez states previous seizures are noted to have the same symptoms as patient presented with today, Left arm "heaviness", "looking to the left." Pt takes vimpat 75 mg BID at home.  (02 Feb 2022 17:58)    S: Patient was examined and seen at bedside. This morning pt is resting comfortably in bed and reports no new issues or overnight events. No complaints, feels well. Poor historian. Family refused Keppra as it made her lethargic in the past. Son was also refusing Neurontin. Eating well as per daughter.  Denies CP, SOB, N/V/D/C/AP, cough, F, chills, dizziness, new focal weakness, HA, vision changes, dysuria, or urinary symptoms, blood in stool.  ROS: all other systems reviewed and are negative    PAST MEDICAL & SURGICAL HISTORY:  Hypertension    Dementia    Chronic GERD    History of TIA (transient ischemic attack)    Seizure disorder    Folate deficiency    Vitamin B12 deficiency    S/P AKA (above knee amputation), left    H/O left nephrectomy    General: NAD. Looks stated age.  HEENT: clean oropharynx, EOMI, no LAD  Neck: trachea midline, no thyromegaly  CV: nl S1 S2; no m/r/g  Resp: decreased breath sounds at base  GI: NT/ND/S +BS  MS: no clubbing/cyanosis/edema, +Lt AKA  Neuro: motor, sensory intact; + reflexes  Skin: no rashes, nl turgor  Psychiatric: AA0x1+ w/ poor insight and judgement            MEDICATIONS  (STANDING):  aspirin  chewable 81 milliGRAM(s) Oral daily  atorvastatin 40 milliGRAM(s) Oral at bedtime  cefTRIAXone   IVPB 2000 milliGRAM(s) IV Intermittent every 24 hours  chlorhexidine 2% Cloths 1 Application(s) Topical <User Schedule>  gabapentin 100 milliGRAM(s) Oral three times a day  heparin   Injectable 5000 Unit(s) SubCutaneous every 12 hours  lacosamide IVPB 200 milliGRAM(s) IV Intermittent every 12 hours  lactobacillus acidophilus 1 Tablet(s) Oral daily  metoprolol tartrate 25 milliGRAM(s) Oral two times a day  QUEtiapine 100 milliGRAM(s) Oral at bedtime  QUEtiapine 25 milliGRAM(s) Oral daily  senna 2 Tablet(s) Oral at bedtime  tamsulosin 0.4 milliGRAM(s) Oral at bedtime  vancomycin    Solution 125 milliGRAM(s) Oral every 12 hours    MEDICATIONS  (PRN):  acetaminophen     Tablet .. 650 milliGRAM(s) Oral every 6 hours PRN Moderate Pain (4 - 6)  LORazepam   Injectable 2 milliGRAM(s) IV Push once PRN seizure    Home Medications:  folic acid 1 mg oral tablet: 1 tab(s) orally once a day (02 Feb 2022 18:24)  Lipitor 40 mg oral tablet: 1 tab(s) orally once a day (02 Feb 2022 18:24)  Metoprolol Tartrate 25 mg oral tablet: 1 tab(s) orally once a day (02 Feb 2022 18:24)  Norvasc 5 mg oral tablet: 1 tab(s) orally once a day (02 Feb 2022 18:24)    Vital Signs Last 24 Hrs  T(C): 36.5 (07 Feb 2022 13:18), Max: 37.3 (06 Feb 2022 20:06)  T(F): 97.7 (07 Feb 2022 13:18), Max: 99.2 (06 Feb 2022 20:06)  HR: 99 (07 Feb 2022 13:18) (99 - 130)  BP: 99/58 (07 Feb 2022 13:18) (99/58 - 122/56)  BP(mean): 75 (07 Feb 2022 04:55) (75 - 75)  RR: 17 (07 Feb 2022 13:18) (17 - 20)  SpO2: --  CAPILLARY BLOOD GLUCOSE        LABS:                        11.2   6.02  )-----------( 163      ( 07 Feb 2022 04:30 )             34.4     02-07    142  |  106  |  22<H>  ----------------------------<  111<H>  4.1   |  24  |  0.8    Ca    10.2<H>      07 Feb 2022 04:30  Mg     2.0     02-07    TPro  5.8<L>  /  Alb  3.5  /  TBili  0.3  /  DBili  x   /  AST  15  /  ALT  12  /  AlkPhos  106  02-07    LIVER FUNCTIONS - ( 07 Feb 2022 04:30 )  Alb: 3.5 g/dL / Pro: 5.8 g/dL / ALK PHOS: 106 U/L / ALT: 12 U/L / AST: 15 U/L / GGT: x                           Consultant Notes Reviewed:  [x ] YES  [ ] NO  Care Discussed with Consultants/Other Providers/ Housestaff [ x] YES  [ ] NO  Radiology, labs, new studies personally reviewed.
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
## 2022-02-07 NOTE — CONSULT NOTE ADULT - ATTENDING COMMENTS
Agree with above
76 year old Georgian speaking lady with PMHx of Alzheimer's dementia, 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 Family states pt admitted for seizures in November December and January. Son Mr. Velasquez states previous seizures are noted to have the same symptoms as patient presented with today, Left arm "heaviness", "looking to the left." Pt takes vimpat 75 mg BID at home. Patient had fever spike at presentation with + UA  Plan:  - CT head reviewed   - Loaded with 2 g Keppra in ED   - Received 2 doses of 2 mg IV ativan in ED  - Keep Mg>2  - Fall & Seizure precautions  - EEG stat   - VEEG when able   -Started on emperic abx coverage (Vanc/ceftaz) will add acyclovir +/- spinal tap if MS does not improves after sedation wears off
Pt is a 77 yo F with PMHx of dementia, prior ischemic stroke with residual left hemiparesis, epilepsy, HTN, HLD, recurrent UTI, who presents with seizure. Pt's daughter states that she has been unresponsive for several hours. Pt has left gaze deviation with left hemiparesis, not attending to examiner or withdrawing to pain. Pt's daughter states that this is her seizure semiology. Last seizure was one month ago, had UTI at that time. Pt is febrile in ED. Ativan 2mg IV x 2, LEV 2G IV stat. Discussed with [facility] attending for management of status epilepticus. Stroke neurology to sign off, please call with any further questions.

## 2022-02-07 NOTE — PROGRESS NOTE ADULT - SUBJECTIVE AND OBJECTIVE BOX
COLON, DANISHA  76y, Female  Allergy: adhesives (Unknown)  valproic acid (Unknown)      LOS  5d    CHIEF COMPLAINT: Seizures (07 Feb 2022 11:25)      INTERVAL EVENTS/HPI  - No acute events overnight  - T(F): , Max: 99.2 (02-06-22 @ 20:06)  - Denies any worsening symptoms  - Tolerating medication  - WBC Count: 6.02 (02-07-22 @ 04:30)  WBC Count: 4.65 (02-06-22 @ 08:33)     - Creatinine, Serum: 0.8 (02-07-22 @ 04:30)  Creatinine, Serum: 1.1 (02-06-22 @ 04:30)       ROS  General: Denies rigors, nightsweats  HEENT: Denies headache, rhinorrhea, sore throat, eye pain  CV: Denies CP, palpitations  PULM: Denies wheezing, hemoptysis  GI: Denies hematemesis, hematochezia, melena  : Denies discharge, hematuria  MSK: Denies arthralgias, myalgias  SKIN: Denies rash, lesions  NEURO: Denies paresthesias, weakness  PSYCH: Denies depression, anxiety    VITALS:  T(F): 97.7, Max: 99.2 (02-06-22 @ 20:06)  HR: 99  BP: 99/58  RR: 17Vital Signs Last 24 Hrs  T(C): 36.5 (07 Feb 2022 13:18), Max: 37.3 (06 Feb 2022 20:06)  T(F): 97.7 (07 Feb 2022 13:18), Max: 99.2 (06 Feb 2022 20:06)  HR: 99 (07 Feb 2022 13:18) (99 - 130)  BP: 99/58 (07 Feb 2022 13:18) (99/58 - 122/56)  BP(mean): 75 (07 Feb 2022 04:55) (75 - 75)  RR: 17 (07 Feb 2022 13:18) (17 - 20)  SpO2: --    PHYSICAL EXAM:  Gen: NAD, resting in bed  HEENT: Normocephalic, atraumatic  Neck: supple, no lymphadenopathy  CV: Regular rate & regular rhythm  Lungs: decreased BS at bases, no fremitus  Abdomen: Soft, BS present  Ext: Warm, well perfused  Neuro: non focal, awake  Skin: no rash, no erythema  Lines: no phlebitis    FH: Non-contributory  Social Hx: Non-contributory    TESTS & MEASUREMENTS:                        11.2   6.02  )-----------( 163      ( 07 Feb 2022 04:30 )             34.4     02-07    142  |  106  |  22<H>  ----------------------------<  111<H>  4.1   |  24  |  0.8    Ca    10.2<H>      07 Feb 2022 04:30  Mg     2.0     02-07    TPro  5.8<L>  /  Alb  3.5  /  TBili  0.3  /  DBili  x   /  AST  15  /  ALT  12  /  AlkPhos  106  02-07    eGFR if Non African American: 71 mL/min/1.73M2 (02-07-22 @ 04:30)  eGFR if African American: 82 mL/min/1.73M2 (02-07-22 @ 04:30)    LIVER FUNCTIONS - ( 07 Feb 2022 04:30 )  Alb: 3.5 g/dL / Pro: 5.8 g/dL / ALK PHOS: 106 U/L / ALT: 12 U/L / AST: 15 U/L / GGT: x               Culture - Blood (collected 02-02-22 @ 13:45)  Source: .Blood Blood-Peripheral  Gram Stain (02-03-22 @ 09:13):    Growth in aerobic bottle: Gram Negative Rods  Final Report (02-05-22 @ 13:52):    Growth in aerobic bottle: Escherichia coli    ***Blood Panel PCR results on this specimen are available    approximately 3 hours after the Gram stain result.***    Gram stain, PCR, and/or culture results may not always    correspond due to difference in methodologies.    ************************************************************    This PCR assay was performed by multiplex PCR. This    Assay tests for 66 bacterial and resistance gene targets.    Please refer to the [facility] test directory    at [email or web address] for details.  Organism: Blood Culture PCR  Escherichia coli (02-05-22 @ 13:52)  Organism: Escherichia coli (02-05-22 @ 13:52)      -  Amikacin: S <=16      -  Ampicillin: R >16 These ampicillin results predict results for amoxicillin      -  Ampicillin/Sulbactam: I 16/8 Enterobacter, Klebsiella aerogenes, Citrobacter, and Serratia may develop resistance during prolonged therapy (3-4 days)      -  Aztreonam: S <=4      -  Cefazolin: S <=2 Enterobacter, Klebsiella aerogenes, Citrobacter, and Serratia may develop resistance during prolonged therapy (3-4 days)      -  Cefepime: S <=2      -  Cefoxitin: S <=8      -  Ceftriaxone: S <=1 Enterobacter, Klebsiella aerogenes, Citrobacter, and Serratia may develop resistance during prolonged therapy      -  Ciprofloxacin: R >2      -  Ertapenem: S <=0.5      -  Gentamicin: R >8      -  Imipenem: S <=1      -  Levofloxacin: R >4      -  Meropenem: S <=1      -  Piperacillin/Tazobactam: S <=8      -  Tobramycin: I 8      -  Trimethoprim/Sulfamethoxazole: R >2/38      Method Type: JENI  Organism: Blood Culture PCR (02-05-22 @ 13:52)      -  Escherichia coli: Detec      Method Type: PCR    Culture - Blood (collected 02-02-22 @ 13:45)  Source: .Blood Blood-Peripheral  Preliminary Report (02-03-22 @ 22:02):    No growth to date.    Culture - Urine (collected 02-02-22 @ 12:45)  Source: Catheterized Catheterized  Final Report (02-05-22 @ 08:24):    >100,000 CFU/ml Escherichia coli  Organism: Escherichia coli (02-05-22 @ 08:24)  Organism: Escherichia coli (02-05-22 @ 08:24)      -  Amikacin: S <=16      -  Amoxicillin/Clavulanic Acid: S <=8/4      -  Ampicillin: R >16 These ampicillin results predict results for amoxicillin      -  Ampicillin/Sulbactam: I 16/8 Enterobacter, Klebsiella aerogenes, Citrobacter, and Serratia may develop resistance during prolonged therapy (3-4 days)      -  Aztreonam: S <=4      -  Cefazolin: S 16 (MIC_CL_COM_ENTERIC_CEFAZU) For uncomplicated UTI with K. pneumoniae, E. coli, or P. mirablis: JENI <=16 is sensitive and JENI >=32 is resistant. This also predicts results for oral agents cefaclor, cefdinir, cefpodoxime, cefprozil, cefuroxime axetil, cephalexin and locarbef for uncomplicated UTI. Note that some isolates may be susceptible to these agents while testing resistant to cefazolin.      -  Cefepime: S <=2      -  Cefoxitin: S <=8      -  Ceftriaxone: S <=1 Enterobacter, Klebsiella aerogenes, Citrobacter, and Serratia may develop resistance during prolonged therapy      -  Ciprofloxacin: R >2      -  Ertapenem: S <=0.5      -  Gentamicin: R >8      -  Imipenem: S <=1      -  Levofloxacin: R >4      -  Meropenem: S <=1      -  Nitrofurantoin: S <=32 Should not be used to treat pyelonephritis      -  Piperacillin/Tazobactam: S <=8      -  Tigecycline: S <=2      -  Tobramycin: I 8      -  Trimethoprim/Sulfamethoxazole: R >2/38      Method Type: JENI    Culture - Blood (collected 01-21-22 @ 01:10)  Source: .Blood Blood  Final Report (01-26-22 @ 09:01):    No Growth Final    Culture - Urine (collected 01-19-22 @ 17:20)  Source: Catheterized Catheterized  Final Report (01-23-22 @ 12:28):    >100,000 CFU/ml Escherichia coli  Organism: Escherichia coli (01-23-22 @ 12:28)  Organism: Escherichia coli (01-23-22 @ 12:28)      -  Amikacin: S <=16      -  Amoxicillin/Clavulanic Acid: S <=8/4      -  Ampicillin: R >16 These ampicillin results predict results for amoxicillin      -  Ampicillin/Sulbactam: I 16/8 Enterobacter, Klebsiella aerogenes, Citrobacter, and Serratia may develop resistance during prolonged therapy (3-4 days)      -  Aztreonam: S <=4      -  Cefazolin: S <=2 (MIC_CL_COM_ENTERIC_CEFAZU) For uncomplicated UTI with K. pneumoniae, E. coli, or P. mirablis: JENI <=16 is sensitive and JENI >=32 is resistant. This also predicts results for oral agents cefaclor, cefdinir, cefpodoxime, cefprozil, cefuroxime axetil, cephalexin and locarbef for uncomplicated UTI. Note that some isolates may be susceptible to these agents while testing resistant to cefazolin.      -  Cefepime: S <=2      -  Cefoxitin: S <=8      -  Ceftriaxone: S <=1 Enterobacter, Klebsiella aerogenes, Citrobacter, and Serratia may develop resistance during prolonged therapy      -  Ciprofloxacin: R >2      -  Ertapenem: S <=0.5      -  Gentamicin: R >8      -  Imipenem: S <=1      -  Levofloxacin: R >4      -  Meropenem: S <=1      -  Nitrofurantoin: S <=32 Should not be used to treat pyelonephritis      -  Piperacillin/Tazobactam: S <=8      -  Tigecycline: S <=2      -  Tobramycin: S 4      -  Trimethoprim/Sulfamethoxazole: R >2/38      Method Type: JENI    Culture - Blood (collected 01-19-22 @ 16:00)  Source: .Blood Blood-Peripheral  Final Report (01-25-22 @ 01:00):    No Growth Final            INFECTIOUS DISEASES TESTING  Procalcitonin, Serum: 0.13 (02-03-22 @ 06:55)  COVID-19 PCR: NotDetec (02-02-22 @ 12:20)  MRSA PCR Result.: Negative (01-19-22 @ 17:20)  Procalcitonin, Serum: 0.07 (01-19-22 @ 11:00)  Procalcitonin, Serum: 0.06 (01-18-22 @ 23:24)  COVID-19 PCR: Detected (01-18-22 @ 21:31)  COVID-19 PCR: NotDetec (12-22-21 @ 08:06)  COVID-19 PCR: NotDetec (12-02-21 @ 04:30)  MRSA PCR Result.: Negative (11-26-21 @ 03:30)  COVID-19 PCR: NotDetec (11-25-21 @ 18:55)  COVID-19 PCR: NotDetec (06-25-21 @ 14:25)      INFLAMMATORY MARKERS  C-Reactive Protein, Serum: 24 mg/L (01-19-22 @ 14:00)  Sedimentation Rate, Erythrocyte: 62 mm/Hr (01-19-22 @ 14:00)      RADIOLOGY & ADDITIONAL TESTS:  I have personally reviewed the last available Chest xray  CXR      CT      CARDIOLOGY TESTING  12 Lead ECG:   Ventricular Rate 103 BPM    Atrial Rate 103 BPM    P-R Interval 146 ms    QRS Duration 94 ms    Q-T Interval 314 ms    QTC Calculation(Bazett) 411 ms    P Axis 46 degrees    R Axis -16 degrees    T Axis 18 degrees    Diagnosis Line *** Poor data quality, interpretation may be adversely affected  Sinus tachycardia  Left ventricular hypertrophy with repolarization abnormality  Possible Inferior infarct , age undetermined  Abnormal ECG    Confirmed by Rito Albarado ([id number]) on 2/2/2022 4:43:20 PM (02-02-22 @ 13:19)      MEDICATIONS  aspirin  chewable 81 Oral daily  atorvastatin 40 Oral at bedtime  cefTRIAXone   IVPB 2000 IV Intermittent every 24 hours  chlorhexidine 2% Cloths 1 Topical <User Schedule>  gabapentin 100 Oral three times a day  heparin   Injectable 5000 SubCutaneous every 12 hours  lacosamide IVPB 200 IV Intermittent every 12 hours  lactobacillus acidophilus 1 Oral daily  metoprolol tartrate 25 Oral two times a day  QUEtiapine 100 Oral at bedtime  QUEtiapine 25 Oral daily  senna 2 Oral at bedtime  tamsulosin 0.4 Oral at bedtime  vancomycin    Solution 125 Oral every 12 hours      WEIGHT  Weight (kg): 44.2 (02-06-22 @ 13:55)  Creatinine, Serum: 0.8 mg/dL (02-07-22 @ 04:30)      ANTIBIOTICS:  cefTRIAXone   IVPB 2000 milliGRAM(s) IV Intermittent every 24 hours  vancomycin    Solution 125 milliGRAM(s) Oral every 12 hours      All available historical records have been reviewed

## 2022-02-07 NOTE — CDI QUERY NOTE - NSCDIOTHERTXTBX_GEN_ALL_CORE_HH
___________________________________________________________________________________________________________________________________      76 F, with Diagnosis:  #Seizures , #UTI; E. coli Bacteremia/Urinary retention     Clinical Indicator:   Flowsheet: V/S:  2/2/2022:  T=101.6,  GD=421-580/min  Labs:    WBC= 12.73,   Blood Cx=  E Coli,   Urine cx=  E Coli    Documentation:  2/2/2022:  ED:  Attending:  Patient was treated for UTI/sepsis with Rocephin- BCx sent, Status Epilepticus     2/7/2022: ID Consult :  #Seizure activity - possible provoked by UTI   # E coli bacteremia secondary to UTI , - Blood Cx Feb 2 E coli       Meds:  Ceftriaxone (2/2/2022- active)    Based on your professional judgment and clinical indicators, can the Sepsis diagnosis be further clarified as:     []  Sepsis ruled in  []  Sepsis ruled out  []  Other (please specify)  []  Clinically unable to determine    Thank you.

## 2022-02-07 NOTE — EEG REPORT - NS EEG TEXT BOX
88Epilepsy Attending Note:     DANISHA JIMÉNEZ    76y Female  MRN [id number]    Vital Signs Last 24 Hrs  T(C): 36.4 (07 Feb 2022 04:55), Max: 37.3 (06 Feb 2022 20:06)  T(F): 97.5 (07 Feb 2022 04:55), Max: 99.2 (06 Feb 2022 20:06)  HR: 108 (07 Feb 2022 04:55) (101 - 130)  BP: 116/56 (07 Feb 2022 04:55) (111/59 - 142/94)  BP(mean): 75 (07 Feb 2022 04:55) (75 - 75)  RR: 18 (07 Feb 2022 04:55) (18 - 20)  SpO2: 97% (06 Feb 2022 13:56) (97% - 97%)                          11.2   6.02  )-----------( 163      ( 07 Feb 2022 04:30 )             34.4       02-07    142  |  106  |  22<H>  ----------------------------<  111<H>  4.1   |  24  |  0.8    Ca    10.2<H>      07 Feb 2022 04:30  Mg     2.0     02-07    TPro  5.8<L>  /  Alb  3.5  /  TBili  0.3  /  DBili  x   /  AST  15  /  ALT  12  /  AlkPhos  106  02-07      MEDICATIONS  (STANDING):  cefTRIAXone   IVPB 2000 milliGRAM(s) IV Intermittent every 24 hours  chlorhexidine 2% Cloths 1 Application(s) Topical <User Schedule>  gabapentin 100 milliGRAM(s) Oral three times a day  heparin   Injectable 5000 Unit(s) SubCutaneous every 12 hours  lacosamide IVPB 200 milliGRAM(s) IV Intermittent every 12 hours  lactobacillus acidophilus 1 Tablet(s) Oral daily  metoprolol tartrate 25 milliGRAM(s) Oral two times a day  QUEtiapine 100 milliGRAM(s) Oral at bedtime  QUEtiapine 25 milliGRAM(s) Oral daily  senna 2 Tablet(s) Oral at bedtime  tamsulosin 0.4 milliGRAM(s) Oral at bedtime  vancomycin    Solution 125 milliGRAM(s) Oral every 12 hours    MEDICATIONS  (PRN):  acetaminophen     Tablet .. 650 milliGRAM(s) Oral every 6 hours PRN Moderate Pain (4 - 6)  LORazepam   Injectable 2 milliGRAM(s) IV Push once PRN seizure            VEEG in the last 24 hours:    Background--continues , less than optimally organized reaching 6-7 hz    Focal and generalized slowing---mild to moderate generalized slowing. Moderate bilateral right significantly more than left posterior quadrants focal slowing    Interictal activity---------independent Rt. >> left sharp waves and spike and aftergoing slow    Events-none    Seizures---none    Impression:   abnormal as above    Plan --- as/Neurology team

## 2022-02-07 NOTE — PROGRESS NOTE ADULT - ATTENDING COMMENTS
Patient examined this morning and seems improved.  EEG with epileptiform discharges but no electrographic seizures.  Patient is high risk for seizure recurrence.  I spoke with epilepsy team and they suggest a second anticonvulsant at low dose e.g. lamotrigine 12.5 mg increasing by 12.5 mg each week in divided doses until taking  50 mg twice a day.  Will need to discuss with family prior to initiating this as they have raised concerns in past regarding medications (e.g. levetiracetam and gabapentin).

## 2022-02-07 NOTE — CONSULT NOTE ADULT - ASSESSMENT
77 y/o f with solitary kidney, with right hydronephrosis.  and urinary retention    A) Urinary retention on CIC   right hydronephrosis most likely 2/2 retention  NL renal function  nephrolithiasis    P) Get Non contrast Ct scan abdomen and pelvis  r/o obstructing stone.  continue CIC consider Horowitz placement on d/c  OP f/u with Dr Escalera for urodynamic testing when medically optimal  no acute urologic intervention at this time unless CT reveals an obstructing stone  medical clearance for general anesthesia  will d/w attending

## 2022-02-07 NOTE — PROGRESS NOTE ADULT - ASSESSMENT
ASSESSMENT  This is a 75 yo F with PMHx of Alzheimer's dementia, CVA with left sided weakness and left facial droop, Left AKA, seizure d/o, UTI, HTN, HLD presents to the ER for seizure activity    IMPRESSION  #Seizure activity - possible provoked by UTI   # E coli bacteremia secondary to UTI   - Blood Cx Feb 2 E coli     #Recurrent UTI  #Chronic Right sided hydro     #Alzheimer's dementia  #CVA   #Left AKA   #Obesity BMI (kg/m2): 19.7  #Abx allergy: valproic acid (Unknown)    RECOMMENDATIONS  - continue ceftriaxone 2g daily   - will plan 14 day course of antibiotics (end date 2/15) -- can finish with PO vantin 200 mg BID on discharge  - after completing course, start macrobid 100 mg daily for prophylaxis   - continue PO vancomycin 125 mg BID until 2/20  - eventual urology evaluation for right hydro  - recall PRN     Please call or message on [employer] Teams if with any questions.  Spectra [phone]

## 2022-02-07 NOTE — PROGRESS NOTE ADULT - ASSESSMENT
Assessment:  This is a 77 yo F with PMHx of Alzheimer's dementia, CVA with left sided weakness and left facial droop, Left AKA, seizure d/o, UTI, HTN, HLD presents to the ER for seizure activity in status epilepticus. Downgraded from NCC. Likely seizures 2/2 underlying infection.    Plan: Assessment:  This is a 77 yo F with PMHx of Alzheimer's dementia, CVA with left sided weakness and left facial droop, Left AKA, seizure d/o, UTI, HTN, HLD presents to the ER for seizure activity in status epilepticus. Downgraded from NCC. Likely seizures 2/2 underlying infection.    Rpt EEG improved.     Plan:  - c/w mgmt of infectious process  - MR Head pending   - c/w lacosamide and quetiapine   - seizure precautions  - keep mg > 2
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
## 2022-02-08 NOTE — PROGRESS NOTE ADULT - SUBJECTIVE AND OBJECTIVE BOX
Neurology  Progress Note  02-08-22    Name:  DANISHA JIMÉNEZ; 76y    Interval History:    HPI:  This is a 75 yo F with PMHx of Alzheimer's dementia, 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 Code stroke was initially called, no tpa or intervention. Imaging negative. Family states pt admitted for seizures in November December and January. Son Mr. Velasquez states previous seizures are noted to have the same symptoms as patient presented with today, Left arm "heaviness", "looking to the left." Pt takes vimpat 75 mg BID at home.  (02 Feb 2022 17:58)    REVIEW OF SYSTEMS:  As states in HPI.    Medications:  acetaminophen     Tablet .. 650 milliGRAM(s) Oral every 6 hours PRN  acetaminophen  Suppository .. 975 milliGRAM(s) Rectal once  aspirin  chewable 81 milliGRAM(s) Oral daily  atorvastatin 40 milliGRAM(s) Oral at bedtime  cefTRIAXone   IVPB 1000 milliGRAM(s) IV Intermittent once  cefTRIAXone   IVPB 2000 milliGRAM(s) IV Intermittent every 24 hours  chlorhexidine 2% Cloths 1 Application(s) Topical <User Schedule>  gabapentin 100 milliGRAM(s) Oral three times a day  heparin   Injectable 5000 Unit(s) SubCutaneous every 12 hours  lacosamide IVPB 200 milliGRAM(s) IV Intermittent every 12 hours  lacosamide IVPB 50 milliGRAM(s) IV Intermittent once  lactobacillus acidophilus 1 Tablet(s) Oral daily  levETIRAcetam  IVPB 2000 milliGRAM(s) IV Intermittent once  LORazepam   Injectable 2 milliGRAM(s) IV Push once  LORazepam   Injectable 2 milliGRAM(s) IV Push once  LORazepam   Injectable 2 milliGRAM(s) IV Push once PRN  magnesium sulfate  IVPB 2 Gram(s) IV Intermittent once  metoprolol tartrate 25 milliGRAM(s) Oral two times a day  potassium chloride    Tablet ER 20 milliEquivalent(s) Oral every 2 hours  potassium chloride   Powder 40 milliEquivalent(s) Oral once  potassium chloride  20 mEq/100 mL IVPB 20 milliEquivalent(s) IV Intermittent once  QUEtiapine 100 milliGRAM(s) Oral at bedtime  QUEtiapine 25 milliGRAM(s) Oral daily  senna 2 Tablet(s) Oral at bedtime  sodium chloride 0.9% Bolus 500 milliLiter(s) IV Bolus once  tamsulosin 0.4 milliGRAM(s) Oral at bedtime  vancomycin    Solution 125 milliGRAM(s) Oral every 12 hours  vancomycin  IVPB 750 milliGRAM(s) IV Intermittent once    Vitals:  T(C): 36.1 (02-08-22 @ 05:07), Max: 36.6 (02-07-22 @ 20:36)  HR: 108 (02-08-22 @ 05:07) (99 - 123)  BP: 114/58 (02-08-22 @ 05:07) (99/58 - 122/59)  RR: 18 (02-08-22 @ 05:07) (17 - 18)  SpO2: 95% (02-07-22 @ 20:19) (95% - 95%)    Labs:                        11.6   6.30  )-----------( 165      ( 08 Feb 2022 06:00 )             36.7     02-08    143  |  108  |  17  ----------------------------<  109<H>  4.4   |  22  |  0.9    Ca    10.4<H>      08 Feb 2022 06:00  Mg     1.9     02-08    TPro  6.5  /  Alb  3.8  /  TBili  0.2  /  DBili  x   /  AST  21  /  ALT  17  /  AlkPhos  123<H>  02-08    CAPILLARY BLOOD GLUCOSE        LIVER FUNCTIONS - ( 08 Feb 2022 06:00 )  Alb: 3.8 g/dL / Pro: 6.5 g/dL / ALK PHOS: 123 U/L / ALT: 17 U/L / AST: 21 U/L / GGT: x               CSF:                  PHYSICAL EXAMINATION:  General: Well-developed, well nourished, in no acute distress.  Eyes: Conjunctiva and sclera clear.  Neurologic:  - Mental Status:  Alert, awake, oriented to person, place, and time; Speech is fluent with intact naming, repetition, and comprehension; Good overall fund of knowledge; Immediate recall is 3/3 words and delayed recall is 3/3 words at 5 minutes; Able to spell WORLD backwards and perform serial 7 subtraction; Able to read and write a sentence.  - Cranial Nerves II-XII:    II:  Visual fields are full to confronation; Pupils are equal, round, and reactive to light; Visual acuity is 20/20 bilaterally; Fundoscopic exam is normal with sharp discs.  III, IV, VI:  Extraocular movements are intact without nystagmus.  V:  Facial sensation is intact in the V1-V3 distribution bilaterally.  VII:  Face is symmetric with normal eye closure and smile  VIII:  Hearing is intact to finger rub.  IX, X:  Uvula is midline and soft palate rises symmetrically  XI:  Head turning and shoulder shrug are intact.  XII:  Tongue protudes in the midline.  - Motor:  Strength is 5/5 throughout.  There is no prontator drift.  Normal muscle bulk and tone throughout.  - Reflexes:  2+ and symmetric at the biceps, triceps, brachioradialis, knees, and ankles.  Plantar responses flexor.  - Sensory:  Intact throughout to vibration, and joint-position, light touch, pin prick.  - Coordination:  Finger-nose-finger and heel-knee-shin intact without dysmetria.  Rapid alternating hand movements intact.  - Gait:   Normal steps, base, arm swing, and turning.  Heel and toe walking are normal.  Tandem gait is normal.  Romberg testing is negative.    Radiology:       THIS IS AN INCOMPLETE NOTE . FULL NOTE TO FOLLOW SHORTLY     Neurology  Progress Note  02-08-22    Name:  DANISHA JIMÉNEZ; 76y    Interval History:    HPI:  This is a 77 yo F with PMHx of Alzheimer's dementia, 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 Code stroke was initially called, no tpa or intervention. Imaging negative. Family states pt admitted for seizures in November December and January. Son Mr. Velasquez states previous seizures are noted to have the same symptoms as patient presented with today, Left arm "heaviness", "looking to the left." Pt takes vimpat 75 mg BID at home.  (02 Feb 2022 17:58)    REVIEW OF SYSTEMS:  As states in HPI.    Medications:  acetaminophen     Tablet .. 650 milliGRAM(s) Oral every 6 hours PRN  acetaminophen  Suppository .. 975 milliGRAM(s) Rectal once  aspirin  chewable 81 milliGRAM(s) Oral daily  atorvastatin 40 milliGRAM(s) Oral at bedtime  cefTRIAXone   IVPB 1000 milliGRAM(s) IV Intermittent once  cefTRIAXone   IVPB 2000 milliGRAM(s) IV Intermittent every 24 hours  chlorhexidine 2% Cloths 1 Application(s) Topical <User Schedule>  gabapentin 100 milliGRAM(s) Oral three times a day  heparin   Injectable 5000 Unit(s) SubCutaneous every 12 hours  lacosamide IVPB 200 milliGRAM(s) IV Intermittent every 12 hours  lacosamide IVPB 50 milliGRAM(s) IV Intermittent once  lactobacillus acidophilus 1 Tablet(s) Oral daily  levETIRAcetam  IVPB 2000 milliGRAM(s) IV Intermittent once  LORazepam   Injectable 2 milliGRAM(s) IV Push once  LORazepam   Injectable 2 milliGRAM(s) IV Push once  LORazepam   Injectable 2 milliGRAM(s) IV Push once PRN  magnesium sulfate  IVPB 2 Gram(s) IV Intermittent once  metoprolol tartrate 25 milliGRAM(s) Oral two times a day  potassium chloride    Tablet ER 20 milliEquivalent(s) Oral every 2 hours  potassium chloride   Powder 40 milliEquivalent(s) Oral once  potassium chloride  20 mEq/100 mL IVPB 20 milliEquivalent(s) IV Intermittent once  QUEtiapine 100 milliGRAM(s) Oral at bedtime  QUEtiapine 25 milliGRAM(s) Oral daily  senna 2 Tablet(s) Oral at bedtime  sodium chloride 0.9% Bolus 500 milliLiter(s) IV Bolus once  tamsulosin 0.4 milliGRAM(s) Oral at bedtime  vancomycin    Solution 125 milliGRAM(s) Oral every 12 hours  vancomycin  IVPB 750 milliGRAM(s) IV Intermittent once    Vitals:  T(C): 36.1 (02-08-22 @ 05:07), Max: 36.6 (02-07-22 @ 20:36)  HR: 108 (02-08-22 @ 05:07) (99 - 123)  BP: 114/58 (02-08-22 @ 05:07) (99/58 - 122/59)  RR: 18 (02-08-22 @ 05:07) (17 - 18)  SpO2: 95% (02-07-22 @ 20:19) (95% - 95%)    Labs:                        11.6   6.30  )-----------( 165      ( 08 Feb 2022 06:00 )             36.7     02-08    143  |  108  |  17  ----------------------------<  109<H>  4.4   |  22  |  0.9    Ca    10.4<H>      08 Feb 2022 06:00  Mg     1.9     02-08    TPro  6.5  /  Alb  3.8  /  TBili  0.2  /  DBili  x   /  AST  21  /  ALT  17  /  AlkPhos  123<H>  02-08    CAPILLARY BLOOD GLUCOSE        LIVER FUNCTIONS - ( 08 Feb 2022 06:00 )  Alb: 3.8 g/dL / Pro: 6.5 g/dL / ALK PHOS: 123 U/L / ALT: 17 U/L / AST: 21 U/L / GGT: x               CSF:                  PHYSICAL EXAMINATION:  General: Well-developed, well nourished, in no acute distress.  Eyes: Conjunctiva and sclera clear.  Neurologic:  - Mental Status:  Alert, awake, oriented to person, place, and time; Speech is fluent with intact naming, repetition, and comprehension; Good overall fund of knowledge; Immediate recall is 3/3 words and delayed recall is 3/3 words at 5 minutes; Able to spell WORLD backwards and perform serial 7 subtraction; Able to read and write a sentence.  - Cranial Nerves II-XII:    II:  Visual fields are full to confronation; Pupils are equal, round, and reactive to light; Visual acuity is 20/20 bilaterally; Fundoscopic exam is normal with sharp discs.  III, IV, VI:  Extraocular movements are intact without nystagmus.  V:  Facial sensation is intact in the V1-V3 distribution bilaterally.  VII:  Face is symmetric with normal eye closure and smile  VIII:  Hearing is intact to finger rub.  IX, X:  Uvula is midline and soft palate rises symmetrically  XI:  Head turning and shoulder shrug are intact.  XII:  Tongue protudes in the midline.  - Motor:  Strength is 5/5 throughout.  There is no prontator drift.  Normal muscle bulk and tone throughout.  - Reflexes:  2+ and symmetric at the biceps, triceps, brachioradialis, knees, and ankles.  Plantar responses flexor.  - Sensory:  Intact throughout to vibration, and joint-position, light touch, pin prick.  - Coordination:  Finger-nose-finger and heel-knee-shin intact without dysmetria.  Rapid alternating hand movements intact.  - Gait:   Normal steps, base, arm swing, and turning.  Heel and toe walking are normal.  Tandem gait is normal.  Romberg testing is negative.    Radiology:       THIS IS AN INCOMPLETE NOTE . FULL NOTE TO FOLLOW SHORTLY     Neurology  Progress Note  02-08-22    Name:  DANISHA JIMÉNEZ; 76y    Interval History:    HPI:  This is a 75 yo F with PMHx of Alzheimer's dementia, 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 Code stroke was initially called, no tpa or intervention. Imaging negative. Family states pt admitted for seizures in November December and January. Son Mr. Velasquez states previous seizures are noted to have the same symptoms as patient presented with today, Left arm "heaviness", "looking to the left." Pt takes vimpat 75 mg BID at home.  (02 Feb 2022 17:58)    REVIEW OF SYSTEMS:  As states in HPI.    Medications:  acetaminophen     Tablet .. 650 milliGRAM(s) Oral every 6 hours PRN  acetaminophen  Suppository .. 975 milliGRAM(s) Rectal once  aspirin  chewable 81 milliGRAM(s) Oral daily  atorvastatin 40 milliGRAM(s) Oral at bedtime  cefTRIAXone   IVPB 1000 milliGRAM(s) IV Intermittent once  cefTRIAXone   IVPB 2000 milliGRAM(s) IV Intermittent every 24 hours  chlorhexidine 2% Cloths 1 Application(s) Topical <User Schedule>  gabapentin 100 milliGRAM(s) Oral three times a day  heparin   Injectable 5000 Unit(s) SubCutaneous every 12 hours  lacosamide IVPB 200 milliGRAM(s) IV Intermittent every 12 hours  lacosamide IVPB 50 milliGRAM(s) IV Intermittent once  lactobacillus acidophilus 1 Tablet(s) Oral daily  levETIRAcetam  IVPB 2000 milliGRAM(s) IV Intermittent once  LORazepam   Injectable 2 milliGRAM(s) IV Push once  LORazepam   Injectable 2 milliGRAM(s) IV Push once  LORazepam   Injectable 2 milliGRAM(s) IV Push once PRN  magnesium sulfate  IVPB 2 Gram(s) IV Intermittent once  metoprolol tartrate 25 milliGRAM(s) Oral two times a day  potassium chloride    Tablet ER 20 milliEquivalent(s) Oral every 2 hours  potassium chloride   Powder 40 milliEquivalent(s) Oral once  potassium chloride  20 mEq/100 mL IVPB 20 milliEquivalent(s) IV Intermittent once  QUEtiapine 100 milliGRAM(s) Oral at bedtime  QUEtiapine 25 milliGRAM(s) Oral daily  senna 2 Tablet(s) Oral at bedtime  sodium chloride 0.9% Bolus 500 milliLiter(s) IV Bolus once  tamsulosin 0.4 milliGRAM(s) Oral at bedtime  vancomycin    Solution 125 milliGRAM(s) Oral every 12 hours  vancomycin  IVPB 750 milliGRAM(s) IV Intermittent once    Vitals:  T(C): 36.1 (02-08-22 @ 05:07), Max: 36.6 (02-07-22 @ 20:36)  HR: 108 (02-08-22 @ 05:07) (99 - 123)  BP: 114/58 (02-08-22 @ 05:07) (99/58 - 122/59)  RR: 18 (02-08-22 @ 05:07) (17 - 18)  SpO2: 95% (02-07-22 @ 20:19) (95% - 95%)    Labs:                        11.6   6.30  )-----------( 165      ( 08 Feb 2022 06:00 )             36.7     02-08    143  |  108  |  17  ----------------------------<  109<H>  4.4   |  22  |  0.9    Ca    10.4<H>      08 Feb 2022 06:00  Mg     1.9     02-08    TPro  6.5  /  Alb  3.8  /  TBili  0.2  /  DBili  x   /  AST  21  /  ALT  17  /  AlkPhos  123<H>  02-08    CAPILLARY BLOOD GLUCOSE        LIVER FUNCTIONS - ( 08 Feb 2022 06:00 )  Alb: 3.8 g/dL / Pro: 6.5 g/dL / ALK PHOS: 123 U/L / ALT: 17 U/L / AST: 21 U/L / GGT: x               CSF:                  PHYSICAL EXAMINATION:  General: Well-developed, well nourished, in no acute distress.    Neuro  Eyes: Conjunctiva and sclera clear.  Cranial nerves: Pupils equally round and reactive to light, extraocular muscles intact, V1 through V3 intact bilaterally and symmetric, face symmetric, tongue was midline.  Motor: Moves all 3 extremities anti-gravity. L AKA. Normal tone and bulk.  No abnormal movements.    Sensation: Intact to light touch, pain, temperature and vibration.  Coordination: No dysmetria on finger-to-nose and heel-to-shin.   Reflexes: 2+ in bilateral UE/LE, downgoing toes bilaterally.       Radiology:  < from: CT Brain Stroke Protocol (02.02.22 @ 12:16) >  IMPRESSION:    No acute intracranial pathology, stable exam since 1/18/2022.    Stable severe chronic microvascular ischemic changes. Recommend further   evaluation with MRI if there is continued clinical concern for   superimposed small acute infarct.    Communication: The summary of above findings were discussed with readback   confirmation with TORSTEN Ramesh by radiologist Dr. Lemus on 2/2/2022 at 12:22   PM.    --- End of Report ---        < end of copied text >  < from: CT Head No Cont (01.18.22 @ 19:40) >  Impression:    No CT evidence of acute intracranial injury.    Severe chronic microvascular ischemic changes.    No significant change since 12/22/2021    --- End of Report ---    < end of copied text >       THIS IS AN INCOMPLETE NOTE . FULL NOTE TO FOLLOW SHORTLY     Neurology  Progress Note  02-08-22    Name:  DANISHA JIMÉNEZ; 76y    Interval History:     HPI:  This is a 77 yo F with PMHx of Alzheimer's dementia, 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 Code stroke was initially called, no tpa or intervention. Imaging negative. Family states pt admitted for seizures in November December and January. Son Mr. Velasquez states previous seizures are noted to have the same symptoms as patient presented with today, Left arm "heaviness", "looking to the left." Pt takes vimpat 75 mg BID at home.  (02 Feb 2022 17:58)    REVIEW OF SYSTEMS:  As states in HPI.    Medications:  acetaminophen     Tablet .. 650 milliGRAM(s) Oral every 6 hours PRN  acetaminophen  Suppository .. 975 milliGRAM(s) Rectal once  aspirin  chewable 81 milliGRAM(s) Oral daily  atorvastatin 40 milliGRAM(s) Oral at bedtime  cefTRIAXone   IVPB 1000 milliGRAM(s) IV Intermittent once  cefTRIAXone   IVPB 2000 milliGRAM(s) IV Intermittent every 24 hours  chlorhexidine 2% Cloths 1 Application(s) Topical <User Schedule>  gabapentin 100 milliGRAM(s) Oral three times a day  heparin   Injectable 5000 Unit(s) SubCutaneous every 12 hours  lacosamide IVPB 200 milliGRAM(s) IV Intermittent every 12 hours  lacosamide IVPB 50 milliGRAM(s) IV Intermittent once  lactobacillus acidophilus 1 Tablet(s) Oral daily  levETIRAcetam  IVPB 2000 milliGRAM(s) IV Intermittent once  LORazepam   Injectable 2 milliGRAM(s) IV Push once  LORazepam   Injectable 2 milliGRAM(s) IV Push once  LORazepam   Injectable 2 milliGRAM(s) IV Push once PRN  magnesium sulfate  IVPB 2 Gram(s) IV Intermittent once  metoprolol tartrate 25 milliGRAM(s) Oral two times a day  potassium chloride    Tablet ER 20 milliEquivalent(s) Oral every 2 hours  potassium chloride   Powder 40 milliEquivalent(s) Oral once  potassium chloride  20 mEq/100 mL IVPB 20 milliEquivalent(s) IV Intermittent once  QUEtiapine 100 milliGRAM(s) Oral at bedtime  QUEtiapine 25 milliGRAM(s) Oral daily  senna 2 Tablet(s) Oral at bedtime  sodium chloride 0.9% Bolus 500 milliLiter(s) IV Bolus once  tamsulosin 0.4 milliGRAM(s) Oral at bedtime  vancomycin    Solution 125 milliGRAM(s) Oral every 12 hours  vancomycin  IVPB 750 milliGRAM(s) IV Intermittent once    Vitals:  T(C): 36.1 (02-08-22 @ 05:07), Max: 36.6 (02-07-22 @ 20:36)  HR: 108 (02-08-22 @ 05:07) (99 - 123)  BP: 114/58 (02-08-22 @ 05:07) (99/58 - 122/59)  RR: 18 (02-08-22 @ 05:07) (17 - 18)  SpO2: 95% (02-07-22 @ 20:19) (95% - 95%)    Labs:                        11.6   6.30  )-----------( 165      ( 08 Feb 2022 06:00 )             36.7     02-08    143  |  108  |  17  ----------------------------<  109<H>  4.4   |  22  |  0.9    Ca    10.4<H>      08 Feb 2022 06:00  Mg     1.9     02-08    TPro  6.5  /  Alb  3.8  /  TBili  0.2  /  DBili  x   /  AST  21  /  ALT  17  /  AlkPhos  123<H>  02-08    CAPILLARY BLOOD GLUCOSE      LIVER FUNCTIONS - ( 08 Feb 2022 06:00 )  Alb: 3.8 g/dL / Pro: 6.5 g/dL / ALK PHOS: 123 U/L / ALT: 17 U/L / AST: 21 U/L / GGT: x             CSF:    PHYSICAL EXAMINATION:  General: Well-developed, well nourished, in no acute distress.    Neuro  Eyes: Conjunctiva and sclera clear.  Cranial nerves: Pupils equally round and reactive to light, extraocular muscles intact, V1 through V3 intact bilaterally and symmetric, face symmetric, tongue was midline.  Motor: Moves all 3 extremities anti-gravity. L AKA. Normal tone and bulk.  No abnormal movements.    Sensation: Intact to light touch, pain, temperature and vibration.  Coordination: No dysmetria on finger-to-nose and heel-to-shin.   Reflexes: 2+ in bilateral UE/LE, downgoing toes bilaterally.       Radiology:  < from: CT Brain Stroke Protocol (02.02.22 @ 12:16) >  IMPRESSION:    No acute intracranial pathology, stable exam since 1/18/2022.    Stable severe chronic microvascular ischemic changes. Recommend further   evaluation with MRI if there is continued clinical concern for   superimposed small acute infarct.    Communication: The summary of above findings were discussed with readback   confirmation with TORSTEN Ramesh by radiologist Dr. Lemus on 2/2/2022 at 12:22   PM.    --- End of Report ---      < end of copied text >  < from: CT Head No Cont (01.18.22 @ 19:40) >  Impression:    No CT evidence of acute intracranial injury.    Severe chronic microvascular ischemic changes.    No significant change since 12/22/2021    --- End of Report ---    < end of copied text >       THIS IS AN INCOMPLETE NOTE . FULL NOTE TO FOLLOW SHORTLY     Neurology  Progress Note  22    Name:  DANISHA JIMÉNEZ; 76y    Interval History:     HPI:  This is a 75 yo F with PMHx of Alzheimer's dementia, 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 Code stroke was initially called, no tpa or intervention. Imaging negative. Family states pt admitted for seizures in  and January. Son Mr. Velasquez states previous seizures are noted to have the same symptoms as patient presented with today, Left arm "heaviness", "looking to the left." Pt takes vimpat 75 mg BID at home.  (2022 17:58)    REVIEW OF SYSTEMS:  As states in HPI.    Medications:  acetaminophen     Tablet .. 650 milliGRAM(s) Oral every 6 hours PRN  acetaminophen  Suppository .. 975 milliGRAM(s) Rectal once  aspirin  chewable 81 milliGRAM(s) Oral daily  atorvastatin 40 milliGRAM(s) Oral at bedtime  cefTRIAXone   IVPB 1000 milliGRAM(s) IV Intermittent once  cefTRIAXone   IVPB 2000 milliGRAM(s) IV Intermittent every 24 hours  chlorhexidine 2% Cloths 1 Application(s) Topical <User Schedule>  gabapentin 100 milliGRAM(s) Oral three times a day  heparin   Injectable 5000 Unit(s) SubCutaneous every 12 hours  lacosamide IVPB 200 milliGRAM(s) IV Intermittent every 12 hours  lacosamide IVPB 50 milliGRAM(s) IV Intermittent once  lactobacillus acidophilus 1 Tablet(s) Oral daily  levETIRAcetam  IVPB 2000 milliGRAM(s) IV Intermittent once  LORazepam   Injectable 2 milliGRAM(s) IV Push once  LORazepam   Injectable 2 milliGRAM(s) IV Push once  LORazepam   Injectable 2 milliGRAM(s) IV Push once PRN  magnesium sulfate  IVPB 2 Gram(s) IV Intermittent once  metoprolol tartrate 25 milliGRAM(s) Oral two times a day  potassium chloride    Tablet ER 20 milliEquivalent(s) Oral every 2 hours  potassium chloride   Powder 40 milliEquivalent(s) Oral once  potassium chloride  20 mEq/100 mL IVPB 20 milliEquivalent(s) IV Intermittent once  QUEtiapine 100 milliGRAM(s) Oral at bedtime  QUEtiapine 25 milliGRAM(s) Oral daily  senna 2 Tablet(s) Oral at bedtime  sodium chloride 0.9% Bolus 500 milliLiter(s) IV Bolus once  tamsulosin 0.4 milliGRAM(s) Oral at bedtime  vancomycin    Solution 125 milliGRAM(s) Oral every 12 hours  vancomycin  IVPB 750 milliGRAM(s) IV Intermittent once    Vitals:  T(C): 36.1 (22 @ 05:07), Max: 36.6 (22 @ 20:36)  HR: 108 (22 @ 05:07) (99 - 123)  BP: 114/58 (22 @ 05:07) (99/58 - 122/59)  RR: 18 (22 @ 05:07) (17 - 18)  SpO2: 95% (22 @ 20:19) (95% - 95%)    Labs:                        11.6   6.30  )-----------( 165      ( 2022 06:00 )             36.7     02-08    143  |  108  |  17  ----------------------------<  109<H>  4.4   |  22  |  0.9    Ca    10.4<H>      2022 06:00  Mg     1.9         TPro  6.5  /  Alb  3.8  /  TBili  0.2  /  DBili  x   /  AST  21  /  ALT  17  /  AlkPhos  123<H>  02-08    CAPILLARY BLOOD GLUCOSE      LIVER FUNCTIONS - ( 2022 06:00 )  Alb: 3.8 g/dL / Pro: 6.5 g/dL / ALK PHOS: 123 U/L / ALT: 17 U/L / AST: 21 U/L / GGT: x             CSF:    PHYSICAL EXAMINATION:  General: Well-developed, well nourished, in no acute distress.    Neuro  Eyes: Conjunctiva and sclera clear.  Cranial nerves: Pupils equally round and reactive to light, extraocular muscles intact, V1 through V3 intact bilaterally and symmetric, face symmetric, tongue was midline.  Motor: Moves all 3 extremities anti-gravity. L AKA. Normal tone and bulk.  No abnormal movements.    Sensation: Intact to light touch, pain, temperature and vibration.  Coordination: No dysmetria on finger-to-nose and heel-to-shin.   Reflexes: 2+ in bilateral UE/LE, downgoing toes bilaterally.         VEEG in the last 24 hours: 22    Background - continuous, less than optimally organized, reaching frequencies in the range of 6-7Hz    Focal and generalized slowin. mild to moderate generalized slowing  2. Moderate bilateral right significantly more than left posterior quadrants focal slowing    Interictal activity - independent right >> left sharp waves and spike and after going slow    Events - none    Seizures - none    Impression: Abnormal VEEG as above    Plan - as per neurology team      Radiology:  < from: CT Brain Stroke Protocol (22 @ 12:16) >  IMPRESSION:    No acute intracranial pathology, stable exam since 2022.    Stable severe chronic microvascular ischemic changes. Recommend further   evaluation with MRI if there is continued clinical concern for   superimposed small acute infarct.    Communication: The summary of above findings were discussed with readback   confirmation with TORSTEN Ramesh by radiologist Dr. Lemus on 2022 at 12:22   PM.    --- End of Report ---      < end of copied text >  < from: CT Head No Cont (22 @ 19:40) >  Impression:    No CT evidence of acute intracranial injury.    Severe chronic microvascular ischemic changes.    No significant change since 2021    --- End of Report ---    < end of copied text >       Neurology  Progress Note  22    Name:  DANISHA JIMÉNEZ; 76y    Interval History:     HPI:  This is a 75 yo F with PMHx of Alzheimer's dementia, 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 Code stroke was initially called, no tpa or intervention. Imaging negative. Family states pt admitted for seizures in  and January. Son Mr. Velasquez states previous seizures are noted to have the same symptoms as patient presented with today, Left arm "heaviness", "looking to the left." Pt takes vimpat 75 mg BID at home.  (2022 17:58)    REVIEW OF SYSTEMS:  As states in HPI.    Medications:  acetaminophen     Tablet .. 650 milliGRAM(s) Oral every 6 hours PRN  acetaminophen  Suppository .. 975 milliGRAM(s) Rectal once  aspirin  chewable 81 milliGRAM(s) Oral daily  atorvastatin 40 milliGRAM(s) Oral at bedtime  cefTRIAXone   IVPB 1000 milliGRAM(s) IV Intermittent once  cefTRIAXone   IVPB 2000 milliGRAM(s) IV Intermittent every 24 hours  chlorhexidine 2% Cloths 1 Application(s) Topical <User Schedule>  gabapentin 100 milliGRAM(s) Oral three times a day  heparin   Injectable 5000 Unit(s) SubCutaneous every 12 hours  lacosamide IVPB 200 milliGRAM(s) IV Intermittent every 12 hours  lacosamide IVPB 50 milliGRAM(s) IV Intermittent once  lactobacillus acidophilus 1 Tablet(s) Oral daily  levETIRAcetam  IVPB 2000 milliGRAM(s) IV Intermittent once  LORazepam   Injectable 2 milliGRAM(s) IV Push once  LORazepam   Injectable 2 milliGRAM(s) IV Push once  LORazepam   Injectable 2 milliGRAM(s) IV Push once PRN  magnesium sulfate  IVPB 2 Gram(s) IV Intermittent once  metoprolol tartrate 25 milliGRAM(s) Oral two times a day  potassium chloride    Tablet ER 20 milliEquivalent(s) Oral every 2 hours  potassium chloride   Powder 40 milliEquivalent(s) Oral once  potassium chloride  20 mEq/100 mL IVPB 20 milliEquivalent(s) IV Intermittent once  QUEtiapine 100 milliGRAM(s) Oral at bedtime  QUEtiapine 25 milliGRAM(s) Oral daily  senna 2 Tablet(s) Oral at bedtime  sodium chloride 0.9% Bolus 500 milliLiter(s) IV Bolus once  tamsulosin 0.4 milliGRAM(s) Oral at bedtime  vancomycin    Solution 125 milliGRAM(s) Oral every 12 hours  vancomycin  IVPB 750 milliGRAM(s) IV Intermittent once    Vitals:  T(C): 36.1 (22 @ 05:07), Max: 36.6 (22 @ 20:36)  HR: 108 (22 @ 05:07) (99 - 123)  BP: 114/58 (22 @ 05:07) (99/58 - 122/59)  RR: 18 (22 @ 05:07) (17 - 18)  SpO2: 95% (22 @ 20:19) (95% - 95%)    Labs:                        11.6   6.30  )-----------( 165      ( 2022 06:00 )             36.7     0208    143  |  108  |  17  ----------------------------<  109<H>  4.4   |  22  |  0.9    Ca    10.4<H>      2022 06:00  Mg     1.9     08    TPro  6.5  /  Alb  3.8  /  TBili  0.2  /  DBili  x   /  AST  21  /  ALT  17  /  AlkPhos  123<H>  02-08    CAPILLARY BLOOD GLUCOSE      LIVER FUNCTIONS - ( 2022 06:00 )  Alb: 3.8 g/dL / Pro: 6.5 g/dL / ALK PHOS: 123 U/L / ALT: 17 U/L / AST: 21 U/L / GGT: x             CSF:    PHYSICAL EXAMINATION:  General: Well-developed, well nourished, in no acute distress.    Neuro  Eyes: Conjunctiva and sclera clear.  Cranial nerves: Pupils equally round and reactive to light, extraocular muscles intact, V1 through V3 intact bilaterally and symmetric, face symmetric, tongue was midline.  Motor: Moves all 3 extremities anti-gravity. L AKA. Normal tone and bulk.  No abnormal movements.    Sensation: Intact to light touch, pain, temperature and vibration.  Coordination: No dysmetria on finger-to-nose and heel-to-shin.   Reflexes: 2+ in bilateral UE/LE, downgoing toes bilaterally.         VEEG in the last 24 hours: 22    Background - continuous, less than optimally organized, reaching frequencies in the range of 6-7Hz    Focal and generalized slowin. mild to moderate generalized slowing  2. Moderate bilateral right significantly more than left posterior quadrants focal slowing    Interictal activity - independent right >> left sharp waves and spike and after going slow    Events - none    Seizures - none    Impression: Abnormal VEEG as above    Plan - as per neurology team      Radiology:  < from: CT Brain Stroke Protocol (22 @ 12:16) >  IMPRESSION:    No acute intracranial pathology, stable exam since 2022.    Stable severe chronic microvascular ischemic changes. Recommend further   evaluation with MRI if there is continued clinical concern for   superimposed small acute infarct.    Communication: The summary of above findings were discussed with readback   confirmation with TORSTEN Ramesh by radiologist Dr. Lemus on 2022 at 12:22   PM.    --- End of Report ---      < end of copied text >  < from: CT Head No Cont (22 @ 19:40) >  Impression:    No CT evidence of acute intracranial injury.    Severe chronic microvascular ischemic changes.    No significant change since 2021    --- End of Report ---    < end of copied text >

## 2022-02-08 NOTE — EEG REPORT - NS EEG TEXT BOX
Epilepsy Attending Note:     DANISHA JIMÉNEZ    76y Female  MRN [id number]    Vital Signs Last 24 Hrs  T(C): 36.1 (2022 05:07), Max: 36.6 (2022 20:36)  T(F): 97 (2022 05:07), Max: 97.9 (2022 20:36)  HR: 108 (2022 05:07) (99 - 123)  BP: 114/58 (2022 05:07) (99/58 - 122/59)  BP(mean): --  RR: 18 (2022 05:07) (17 - 18)  SpO2: 95% (2022 20:19) (95% - 95%)                          11.6   6.30  )-----------( 165      ( 2022 06:00 )             36.7       02-08    143  |  108  |  17  ----------------------------<  109<H>  4.4   |  22  |  0.9    Ca    10.4<H>      2022 06:00  Mg     1.9     02-08    TPro  6.5  /  Alb  3.8  /  TBili  0.2  /  DBili  x   /  AST  21  /  ALT  17  /  AlkPhos  123<H>  02-08      MEDICATIONS  (STANDING):  aspirin  chewable 81 milliGRAM(s) Oral daily  atorvastatin 40 milliGRAM(s) Oral at bedtime  cefTRIAXone   IVPB 2000 milliGRAM(s) IV Intermittent every 24 hours  chlorhexidine 2% Cloths 1 Application(s) Topical <User Schedule>  gabapentin 100 milliGRAM(s) Oral three times a day  heparin   Injectable 5000 Unit(s) SubCutaneous every 12 hours  lacosamide IVPB 200 milliGRAM(s) IV Intermittent every 12 hours  lactobacillus acidophilus 1 Tablet(s) Oral daily  metoprolol tartrate 25 milliGRAM(s) Oral two times a day  QUEtiapine 100 milliGRAM(s) Oral at bedtime  QUEtiapine 25 milliGRAM(s) Oral daily  senna 2 Tablet(s) Oral at bedtime  tamsulosin 0.4 milliGRAM(s) Oral at bedtime  vancomycin    Solution 125 milliGRAM(s) Oral every 12 hours    MEDICATIONS  (PRN):  acetaminophen     Tablet .. 650 milliGRAM(s) Oral every 6 hours PRN Moderate Pain (4 - 6)  LORazepam   Injectable 2 milliGRAM(s) IV Push once PRN seizure        VEEG in the last 24 hours:    Background - continuous, less than optimally organized, reaching frequencies in the range of 6-7Hz    Focal and generalized slowin. mild to moderate generalized slowing  2. Moderate bilateral right significantly more than left posterior quadrants focal slowing    Interictal activity - independent right >> left sharp waves and spike and after going slow    Events - none    Seizures - none    Impression: Abnormal VEEG as above    Plan - as per neurology team

## 2022-02-08 NOTE — PROGRESS NOTE ADULT - SUBJECTIVE AND OBJECTIVE BOX
Pt seen and examined w/ .    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 Code stroke was initially called, no tpa or intervention. Imaging negative. Family states pt admitted for seizures in November December and January. Son Mr. Velasquez states previous seizures are noted to have the same symptoms as patient presented with today, Left arm "heaviness", "looking to the left." Pt takes vimpat 75 mg BID at home.  (02 Feb 2022 17:58)    S: Patient was examined and seen at bedside. This morning pt is resting comfortably in bed and reports no new issues or overnight events. No complaints, feels well. Poor historian.   Denies CP, SOB, N/V/D/C/AP, cough, F, chills, dizziness, new focal weakness, HA, vision changes, dysuria, or urinary symptoms, blood in stool.  ROS: all other systems reviewed and are negative    PAST MEDICAL & SURGICAL HISTORY:  Hypertension    Dementia    Chronic GERD    History of TIA (transient ischemic attack)    Seizure disorder    Folate deficiency    Vitamin B12 deficiency    S/P AKA (above knee amputation), left    H/O left nephrectomy    General: NAD. Looks stated age.  HEENT: clean oropharynx, EOMI, no LAD  Neck: trachea midline, no thyromegaly  CV: nl S1 S2; no m/r/g  Resp: decreased breath sounds at base  GI: NT/ND/S +BS  MS: no clubbing/cyanosis/edema, +Lt AKA  Neuro: motor, sensory intact; + reflexes  Skin: no rashes, nl turgor  Psychiatric: AA0x1+ w/ poor insight and judgement            MEDICATIONS  (STANDING):  aspirin  chewable 81 milliGRAM(s) Oral daily  atorvastatin 40 milliGRAM(s) Oral at bedtime  cefTRIAXone   IVPB 2000 milliGRAM(s) IV Intermittent every 24 hours  chlorhexidine 2% Cloths 1 Application(s) Topical <User Schedule>  gabapentin 100 milliGRAM(s) Oral three times a day  heparin   Injectable 5000 Unit(s) SubCutaneous every 12 hours  lacosamide IVPB 200 milliGRAM(s) IV Intermittent every 12 hours  lactobacillus acidophilus 1 Tablet(s) Oral daily  metoprolol tartrate 25 milliGRAM(s) Oral two times a day  QUEtiapine 100 milliGRAM(s) Oral at bedtime  QUEtiapine 25 milliGRAM(s) Oral daily  senna 2 Tablet(s) Oral at bedtime  tamsulosin 0.4 milliGRAM(s) Oral at bedtime  vancomycin    Solution 125 milliGRAM(s) Oral every 12 hours    MEDICATIONS  (PRN):  acetaminophen     Tablet .. 650 milliGRAM(s) Oral every 6 hours PRN Moderate Pain (4 - 6)  LORazepam   Injectable 2 milliGRAM(s) IV Push once PRN seizure    Home Medications:  folic acid 1 mg oral tablet: 1 tab(s) orally once a day (02 Feb 2022 18:24)  Lipitor 40 mg oral tablet: 1 tab(s) orally once a day (02 Feb 2022 18:24)  Metoprolol Tartrate 25 mg oral tablet: 1 tab(s) orally once a day (02 Feb 2022 18:24)  Norvasc 5 mg oral tablet: 1 tab(s) orally once a day (02 Feb 2022 18:24)    Vital Signs Last 24 Hrs  T(C): 37.1 (08 Feb 2022 13:34), Max: 37.1 (08 Feb 2022 13:34)  T(F): 98.8 (08 Feb 2022 13:34), Max: 98.8 (08 Feb 2022 13:34)  HR: 114 (08 Feb 2022 13:34) (108 - 123)  BP: 114/57 (08 Feb 2022 13:34) (102/75 - 122/59)  BP(mean): --  RR: 18 (08 Feb 2022 13:34) (18 - 18)  SpO2: 95% (07 Feb 2022 20:19) (95% - 95%)  CAPILLARY BLOOD GLUCOSE        LABS:                        11.6   6.30  )-----------( 165      ( 08 Feb 2022 06:00 )             36.7     02-08    143  |  108  |  17  ----------------------------<  109<H>  4.4   |  22  |  0.9    Ca    10.4<H>      08 Feb 2022 06:00  Mg     1.9     02-08    TPro  6.5  /  Alb  3.8  /  TBili  0.2  /  DBili  x   /  AST  21  /  ALT  17  /  AlkPhos  123<H>  02-08    LIVER FUNCTIONS - ( 08 Feb 2022 06:00 )  Alb: 3.8 g/dL / Pro: 6.5 g/dL / ALK PHOS: 123 U/L / ALT: 17 U/L / AST: 21 U/L / GGT: x                           Consultant Notes Reviewed:  [x ] YES  [ ] NO  Care Discussed with Consultants/Other Providers/ Housestaff [ x] YES  [ ] NO  Radiology, labs, new studies personally reviewed.

## 2022-02-08 NOTE — PROGRESS NOTE ADULT - ATTENDING COMMENTS
Pt examined on 2/8/22 and I spoke to son at length.  His concerns are that patient be awake in am and during day so she can ambulate and if too drowsy or too tremulous will not be able to do that safely making care more difficult.  He wishes her medication regimen and timing to match what he does at home so 9 am and 9 pm for lacosamide dosing and evening quetiapine.  Am quetiapine should be prn only and he requests something for prn phantom limb pain which can occur a few times a month lasting for several hours.  He does not feel gabapentin helps nor tylenol.  He states pain is not every day so standing med not needed, perhaps NSAID.  I discussed with medical team.  She should f/u with neurologist within a couple of weeks of discharge so lamotrigine titration can be guided.  If she is reasonably awake during day over next 24 hours lamotrigine 12.5 mg may be started at night (one-half of regular 25 mg tablet), then in 1 week 25 mg extended release qhs.  At 2 weeks 50 mg XR lamotrigine may be given qhs.  Continue lacosamide (Vimpat) 200 mg twice a day.

## 2022-02-08 NOTE — PROGRESS NOTE ADULT - ASSESSMENT
Assessment:            Plan:           Assessment:  This is a 75 yo F with PMHx of Alzheimer's dementia, CVA with left sided weakness and left facial droop, Left AKA, seizure d/o, UTI, HTN, HLD presents to the ER for seizure activity in status epilepticus. Downgraded from NCC. Likely seizures 2/2 underlying infection.      Recs:  THIS IS AN INCOMPLETE NOTE . FULL NOTE TO FOLLOW SHORTLY     - c/w mgmt of infectious process  - MR Head ordered, not obtained  - c/w lacosamide and quetiapine   - seizure precautions  - keep mg > 2               Assessment:  This is a 77 yo F with PMHx of Alzheimer's dementia, CVA with left sided weakness and left facial droop, Left AKA, seizure d/o, UTI, HTN, HLD presents to the ER for seizure activity in status epilepticus. Downgraded from NCC. Likely seizures 2/2 underlying infection.    Patient experiencing bilateral upper extremity tremors, and left gaze preference.     #Medication adjustments  Attending spoke with patient's son, Mr. Velasquez regarding medication management. Patient's son expressed strong preference to adjust hospital dosing schedule to better match home dosing schedule. Recommendations as below:   - c/w vimpat, but change timing or medication to 9 AM and 9 PM    - c/w quetiapine at 9 PM standing, but change AM dose to PRN for agitation  - discontinue standing gabapentin, which was previously prescribed for phantom limb pain. No current phantom limb pain. Possibly consider alternative PRN pain medications, but note that reportedly tylenol has not been strong enough in the past.     #Other recommendations  - Patient may benefit from additional AEDs: on 2/10/22 (in 2 days): may start lamotrigine 12.5 mg, and up-titrate to 50 mg 7 days later (2/17/22).  - consider PT for evaluation of patient's ambulation. Patient has bilateral upper extremity tremors and a single leg. Patient may potentially experience lower extremity tremor that can interfere with ambulation, and may benefit from PT evaluation.   - rpt EEG obtained (2/8)  - c/w mgmt of infectious process  - seizure precautions  - keep mg > 2               Assessment:  This is a 77 yo F with PMHx of Alzheimer's dementia, CVA with left sided weakness and left facial droop, Left AKA, seizure d/o, UTI, HTN, HLD presents to the ER for seizure activity in status epilepticus. Downgraded from NCC. Likely seizures 2/2 underlying infection.    Patient experiencing bilateral upper extremity tremors, and left gaze preference.     #Medication adjustments  Attending spoke with patient's son, Mr. Velasquez regarding medication management. Patient's son expressed strong preference to adjust hospital dosing schedule to better match home dosing schedule. Recommendations as below:   - c/w vimpat, but change timing or medication to 9 AM and 9 PM    - c/w quetiapine at 9 PM standing, but change AM dose to PRN for agitation  - discontinue standing gabapentin, which was previously prescribed for phantom limb pain. No current phantom limb pain. Possibly consider alternative PRN pain medications, but note that reportedly tylenol has not been strong enough in the past.     #Other recommendations  - Patient may benefit from additional AEDs: on 2/10/22 (in 2 days): may start lamotrigine 12.5 mg, and up-titrate to 50 mg 7 days later (2/17/22).  - consider PT for evaluation of patient's ambulation. Patient has bilateral upper extremity tremors and a single leg. Patient may potentially experience lower extremity tremor that can interfere with ambulation, and may benefit from PT evaluation.   - rpt EEG obtained (2/8).   - vEEG discontinued.   - c/w mgmt of infectious process  - seizure precautions  - keep mg > 2               Assessment:  This is a 75 yo F with PMHx of Alzheimer's dementia, CVA with left sided weakness and left facial droop, Left AKA, seizure d/o, UTI, HTN, HLD presents to the ER for seizure activity in status epilepticus. Downgraded from NCC. Likely seizures 2/2 underlying infection.    Patient experiencing bilateral upper extremity tremors, and left gaze preference.     #Medication adjustments  Attending spoke with patient's son, Mr. Velasquez regarding medication management. Patient's son expressed strong preference to adjust hospital dosing schedule to better match home dosing schedule. Recommendations as below:   - c/w vimpat, but change timing or medication to 9 AM and 9 PM    - c/w quetiapine at 9 PM standing, but change AM dose to PRN for agitation  - discontinue standing gabapentin, which was previously prescribed for phantom limb pain. No current phantom limb pain. Possibly consider alternative PRN pain medications, but note that reportedly tylenol has not been strong enough in the past.     #Other recommendations  - Patient may benefit from additional AEDs: on 2/10/22 (in 2 days): may start lamotrigine 12.5 mg immediate release (one-half of 25 mg tab), and up-titrate to 25 ER mg 7 days later (2/17/22) then at 14 days to 50 mg ER qhs.  - consider PT for evaluation of patient's ambulation. Patient has bilateral upper extremity tremors and a single leg. Patient may potentially experience lower extremity tremor that can interfere with ambulation, and may benefit from PT evaluation.   - rpt EEG obtained (2/8).   - vEEG discontinued.   - c/w mgmt of infectious process  - seizure precautions  - keep mg > 2

## 2022-02-08 NOTE — PROGRESS NOTE ADULT - TIME BILLING
time spent on review of labs and imaging studies, obtaining history, personally examining patient, counselling and communicating with patient/ family, entering orders for medications/tests/etc, discussions with other health care providers, documentation in electronic health records, independent interpretation of labs, imaging/procedure results and care coordination.
time spent on review of labs and imaging studies, obtaining history, personally examining patient, counselling and communicating with patient/ family, entering orders for medications/tests/etc, discussions with other health care providers, documentation in electronic health records, independent interpretation of labs, imaging/procedure results and care coordination.
I have personally seen and examined this patient.    I have reviewed all pertinent clinical information and reviewed all relevant imaging and diagnostic studies personally.   I counseled the patient about diagnostic testing and treatment plan. All questions were answered.   I discussed recommendations with the primary team.
time spent on review of labs and imaging studies, obtaining history, personally examining patient, counselling and communicating with patient/ family, entering orders for medications/tests/etc, discussions with other health care providers, documentation in electronic health records, independent interpretation of labs, imaging/procedure results and care coordination.
time spent on review of labs and imaging studies, obtaining history, personally examining patient, counselling and communicating with patient/ family, entering orders for medications/tests/etc, discussions with other health care providers, documentation in electronic health records, independent interpretation of labs, imaging/procedure results and care coordination.

## 2022-02-09 NOTE — PROGRESS NOTE ADULT - SUBJECTIVE AND OBJECTIVE BOX
----------Daily Progress Note----------    HISTORY OF PRESENT ILLNESS:  Patient is a 76y old Female who presents with a chief complaint of Seizures (08 Feb 2022 16:14)    Currently admitted to medicine with the primary diagnosis of Status epilepticus       Today is hospital day 7d.     INTERVAL HOSPITAL COURSE / OVERNIGHT EVENTS:    No events overnight    Review of Systems: Otherwise unremarkable     <<<<<PAST MEDICAL & SURGICAL HISTORY>>>>>  Hypertension    Dementia    Chronic GERD    History of TIA (transient ischemic attack)    Seizure disorder    Folate deficiency    Vitamin B12 deficiency    S/P AKA (above knee amputation), left    H/O left nephrectomy      ALLERGIES  adhesives (Unknown)  valproic acid (Unknown)      Home Medications:  folic acid 1 mg oral tablet: 1 tab(s) orally once a day (02 Feb 2022 18:24)  Lipitor 40 mg oral tablet: 1 tab(s) orally once a day (02 Feb 2022 18:24)  Metoprolol Tartrate 25 mg oral tablet: 1 tab(s) orally once a day (02 Feb 2022 18:24)  Norvasc 5 mg oral tablet: 1 tab(s) orally once a day (02 Feb 2022 18:24)        MEDICATIONS  STANDING MEDICATIONS  aspirin  chewable 81 milliGRAM(s) Oral daily  atorvastatin 40 milliGRAM(s) Oral at bedtime  cefTRIAXone   IVPB 2000 milliGRAM(s) IV Intermittent every 24 hours  chlorhexidine 2% Cloths 1 Application(s) Topical <User Schedule>  haloperidol    Injectable 1 milliGRAM(s) IntraMuscular once  heparin   Injectable 5000 Unit(s) SubCutaneous every 12 hours  lacosamide IVPB 200 milliGRAM(s) IV Intermittent every 12 hours  lactobacillus acidophilus 1 Tablet(s) Oral daily  metoprolol tartrate 25 milliGRAM(s) Oral three times a day  QUEtiapine 25 milliGRAM(s) Oral every 12 hours  senna 2 Tablet(s) Oral at bedtime  tamsulosin 0.4 milliGRAM(s) Oral at bedtime  vancomycin    Solution 125 milliGRAM(s) Oral every 12 hours    PRN MEDICATIONS  acetaminophen     Tablet .. 650 milliGRAM(s) Oral every 6 hours PRN  gabapentin 300 milliGRAM(s) Oral daily PRN  LORazepam   Injectable 2 milliGRAM(s) IV Push once PRN    VITALS:  T(F): 98.4  HR: 115  BP: 117/64  RR: 18  SpO2: 96%    <<<<<LABS>>>>>                        11.6   6.30  )-----------( 165      ( 08 Feb 2022 06:00 )             36.7     02-08    143  |  108  |  17  ----------------------------<  109<H>  4.4   |  22  |  0.9    Ca    10.4<H>      08 Feb 2022 06:00  Mg     1.9     02-08    TPro  6.5  /  Alb  3.8  /  TBili  0.2  /  DBili  x   /  AST  21  /  ALT  17  /  AlkPhos  123<H>  02-08            [id number]        <<<<<RADIOLOGY>>>>>    < from: CT Abdomen and Pelvis No Cont (02.09.22 @ 12:27) >    PROCEDURE DATE:  02/09/2022          INTERPRETATION:  CLINICAL STATEMENT: Nephrolithiasis    TECHNIQUE: Contiguous axial CT images were obtained from the lower chest   to the pubicsymphysis without intravenous contrast.  Oral contrast was   not administered.  Reformatted images in the coronal and sagittal planes   were acquired.    COMPARISON CT: None.    OTHER STUDIES USED FOR CORRELATION: Renal sonogram on 2/7/2022      FINDINGS: Limited evaluation due to the lack of intravenous contrast    LOWER CHEST: Subsegmental atelectasis at the lung bases bilaterally.    HEPATOBILIARY: Status post cholecystectomy. Pneumobilia, possibly from   sphincterotomy.    SPLEEN: Unremarkable.    PANCREAS: Unremarkable.    ADRENAL GLANDS: Unremarkable.    KIDNEYS: Status post left nephrectomy. 4.6 x 4.7 x 5.1 cm right lower   renal pelvic calculus causing mild to moderate hydronephrosis towards the   upper pole. There are multiple smaller intrarenal calculi some of which   are linear with the appearance of milk of calcium for example on series 4   image 105.    ABDOMINOPELVIC NODES: Unremarkable.    PELVIC ORGANS: Focus of gas is seen within the bladder on series 2 image   63, correlate for history of instrumentation.    PERITONEUM/MESENTERY/BOWEL: Moderate hiatal hernia. No bowel obstruction,   free fluid or free air. Colonic diverticulosis. The appendix is normal.    BONES/SOFT TISSUES: Multilevel degenerative changes of the spine..    OTHER: A few surgical clips are seen in the right retroperitoneum.   Relative atrophy of the left gluteal/hip musculature.    IMPRESSION:    Status post left nephrectomy.    4.6 x 4.7 x 5.1 cm right lower renal pelvic calculus causing mild to  moderate hydronephrosis towards the upper pole.    Multiple smaller right intrarenal calculi some of which are linear with   the appearance of milk of calcium for example on series 4 image 105.    Focus of gas is seen within the bladder on series 2image 63, correlate   for history of instrumentation.    Moderate hiatal hernia.    Relative atrophy of the left gluteal/hip musculature.    < end of copied text >      <<<<<PHYSICAL EXAM>>>>>  GENERAL: resting comfortably in bed  PULMONARY: decreased bibasilar breath sounds  CARDIOVASCULAR: Regular rate and rhythm, S1-S2, no murmurs  GASTROINTESTINAL: Soft, non-tender, non-distended, no guarding.  NEUROLOGIC: AAOX2    -----------------------------------------------------------------------------------------------------------------------------------------------------------------------------------------------

## 2022-02-09 NOTE — PROGRESS NOTE ADULT - SUBJECTIVE AND OBJECTIVE BOX
Patient seen with daughter at the bedside.  Patient's son was also called on the phone.    She denies flank pain.  Apparently, she had right kidney surgery for stones 10 years ago at [facility].  She is currently undergoing CIC for urinary retention.        PE:  Abdomen:  Soft  :  No CVA tenderness, no suprapubic fullness
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
## 2022-02-09 NOTE — PROGRESS NOTE ADULT - ASSESSMENT
Large Staghorn Calculus with solitary kidney and normal renal function  Urinary retention    1.  Continue CIC.  Urodynamics and cystoscopy as outpatient.  2.  Will need PCNL however this does not need to be done emergently.  Will need PCNT by IR prior to discharge but will need stop ASA and all blood thinners prior to the procedure.  She will need to be cleared also medically prior to the procedure.  Nephrolithotomy can also be done as outpatient.    3.  Plans discussed with the patient's daughter and son.

## 2022-02-09 NOTE — PROVIDER CONTACT NOTE (OTHER) - SITUATION
Patient combative during urethral catheterization attempt, attempting to bite, hit, scratch, and kick, unable to obtain catherterization, assisted by ISIDRO Lawrence;

## 2022-02-09 NOTE — PROVIDER CONTACT NOTE (OTHER) - ACTION/TREATMENT ORDERED:
ISIDRO Lawrence and ISIDRO Byrne unable to catheterize patient; as per MD Flores, do not attempt to catheterize now, will attempt catheterization later.
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
## 2022-02-10 NOTE — CONSULT NOTE ADULT - CONSULT REASON
Seizure
PCN
Hydronephrosis
UTI/Bacteremia
seizure
bacteremia, seizures, transfer of service
r/o Status

## 2022-02-10 NOTE — CONSULT NOTE ADULT - SUBJECTIVE AND OBJECTIVE BOX
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 Code stroke was initially called, no tpa or intervention. Imaging negative. Family states pt admitted for seizures in November December and January. Son Mr. Velasquez states previous seizures are noted to have the same symptoms as patient presented with today, Left arm "heaviness", "looking to the left." Pt takes vimpat 75 mg BID at home.  (02 Feb 2022 17:58)        Vital Signs Last 24 Hrs  T(C): 36.9 (05 Feb 2022 20:28), Max: 37 (05 Feb 2022 13:27)  T(F): 98.5 (05 Feb 2022 20:28), Max: 98.6 (05 Feb 2022 13:27)  HR: 104 (05 Feb 2022 20:28) (96 - 104)  BP: 125/64 (05 Feb 2022 20:28) (112/57 - 130/70)  BP(mean): --  RR: 18 (05 Feb 2022 20:28) (17 - 18)  SpO2: --    Neurological Exam:   At hour of sleep   Patient awake alert oriented to self place  L AKA  Grossly non focal  No events this morning.        Medications:  MEDICATIONS  (STANDING):  cefTRIAXone   IVPB 2000 milliGRAM(s) IV Intermittent every 24 hours  chlorhexidine 2% Cloths 1 Application(s) Topical <User Schedule>  gabapentin 100 milliGRAM(s) Oral three times a day  heparin   Injectable 5000 Unit(s) SubCutaneous every 12 hours  lacosamide IVPB 200 milliGRAM(s) IV Intermittent every 12 hours  levETIRAcetam 500 milliGRAM(s) Oral two times a day  QUEtiapine 100 milliGRAM(s) Oral at bedtime  QUEtiapine 25 milliGRAM(s) Oral daily  senna 2 Tablet(s) Oral at bedtime  tamsulosin 0.4 milliGRAM(s) Oral at bedtime  vancomycin    Solution 125 milliGRAM(s) Oral every 12 hours    MEDICATIONS  (PRN):  acetaminophen     Tablet .. 650 milliGRAM(s) Oral every 6 hours PRN Moderate Pain (4 - 6)  LORazepam   Injectable 2 milliGRAM(s) IV Push once PRN seizure      Labs:  CBC Full  -  ( 05 Feb 2022 07:18 )  WBC Count : 5.66 K/uL  RBC Count : 4.24 M/uL  Hemoglobin : 12.6 g/dL  Hematocrit : 39.7 %  Platelet Count - Automated : 181 K/uL  Mean Cell Volume : 93.6 fL  Mean Cell Hemoglobin : 29.7 pg  Mean Cell Hemoglobin Concentration : 31.7 g/dL  Auto Neutrophil # : x  Auto Lymphocyte # : x  Auto Monocyte # : x  Auto Eosinophil # : x  Auto Basophil # : x  Auto Neutrophil % : x  Auto Lymphocyte % : x  Auto Monocyte % : x  Auto Eosinophil % : x  Auto Basophil % : x    02-05    143  |  108  |  9<L>  ----------------------------<  100<H>  4.2   |  24  |  0.8    Ca    10.1      05 Feb 2022 07:18  Phos  2.2     02-04  Mg     1.9     02-05    TPro  6.7  /  Alb  4.1  /  TBili  0.6  /  DBili  x   /  AST  12  /  ALT  11  /  AlkPhos  103  02-05    LIVER FUNCTIONS - ( 05 Feb 2022 07:18 )  Alb: 4.1 g/dL / Pro: 6.7 g/dL / ALK PHOS: 103 U/L / ALT: 11 U/L / AST: 12 U/L / GGT: x             VEEG in the last 24 hours:    Background------------ continues  , less than optimally organized reaching frequencies in the range of 6-7 Hz    Focal and generalized slowing-   ---- 1-mild to moderate generalized slowing.  2- moderate right hemispheric and mid to moderate bioccipital/posterior quadrant focal slowing right > left    Interictal activity----------   frequent right FT sharps on occasions appear in psuedo-periodic pattern  and moderate number of independent  left or bilateral synchronous occipital  sharp waves     Events------------   none    Seizures---------  none    Impression:  abnormal as above    Plan - as/neurology team  
Neuro Critical Care Consult Note     SUMMARY:  BIBA for altered mental status. Pt sitting in bed 1 hour ago talking to daughter, deviation to left side, stopped speaking as per daughter. . Code Stroke activated.     SEDATION SCORES:  RASS: CAM-ICU:     REVIEW OF SYSTEMS:    VITALS: [] Reviewed    IMAGING/DATA: [] Reviewed    IVF FLUIDS/MEDICATIONS: [] Reviewed    ALLERGIES: Aldhesives (Unknown)    DEVICES:   [] Restraints [X] PIVs [] ET tube [] central line [] PICC [] arterial line [] jacobs [] NGT/OGT [] EVD [] LD [] VLADIMIR/HMV [] LiCOX [] ICP monitor [] Trach [] PEG [] Chest Tube [] other:    EXAMINATION:  General: No acute distress  HEENT: Anicteric sclerae  Cardiac: [id number]  Lungs: Clear  Abdomen: Soft, non-tender, +BS  Extremities: No c/c/e  Skin/Incision Site: Clean, dry and intact  Neurologic: After ativan 4 mg and keppra, pt examined. Pt pupils are midline, equal and reactive. Pt localizes with UE equally with painful stimuli. Pt withdraws LE to pain.     ICU Vital Signs Last 24 Hrs  T(C): 38.7 (02 Feb 2022 12:39), Max: 38.7 (02 Feb 2022 12:39)  T(F): 101.6 (02 Feb 2022 12:39), Max: 101.6 (02 Feb 2022 12:39)  HR: 108 (02 Feb 2022 12:39) (108 - 124)  BP: 141/69 (02 Feb 2022 12:39) (141/69 - 163/72)  BP(mean): --  ABP: --  ABP(mean): --  RR: 20 (02 Feb 2022 12:39) (18 - 20)  SpO2: 97% (02 Feb 2022 12:39) (96% - 97%)        LABS:  Na: 142 (02-02 @ 12:00)  K: 3.6 (02-02 @ 12:00)  Cl: 107 (02-02 @ 12:00)  CO2: 25 (02-02 @ 12:00)  BUN: 14 (02-02 @ 12:00)  Cr: 0.9 (02-02 @ 12:00)  Glu: 152(02-02 @ 12:00)    Hgb: 13.0 (02-02 @ 12:00)  Hct: 40.6 (02-02 @ 12:00)  WBC: 12.73 (02-02 @ 12:00)  Plt: 163 (02-02 @ 12:00)    INR:   PTT:       LIVER FUNCTIONS - ( 02 Feb 2022 12:00 )  Alb: 4.0 g/dL / Pro: 6.6 g/dL / ALK PHOS: 107 U/L / ALT: 15 U/L / AST: 17 U/L / GGT: x           
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 Code Stroke called for unresponsiveness and lateral gaze. Patient was previously on Keppra (discontinued due to drowsiness), then on Depakote (discontinued due to tremors) and started on Vimpat during recent admission , which was started on 22. It was then increased on a prior admission. EMS gave the patient 5 mg Valium. In the ED patient was noted to have seizure like activity, given a total of 4 mg of ativan.       PAST MEDICAL & SURGICAL HISTORY:  Hypertension  Dementia  Chronic GERD  History of TIA (transient ischemic attack)  Seizure disorder  Folate deficiency  Vitamin B12 deficiency  S/P AKA (above knee amputation), left  H/O left nephrectomy      Vital Signs Last 24 Hrs  T(C): 38.7 (2022 12:39), Max: 38.7 (2022 12:39)  T(F): 101.6 (2022 12:39), Max: 101.6 (2022 12:39)  HR: 108 (2022 12:39) (108 - 124)  BP: 141/69 (2022 12:39) (141/69 - 163/72)  RR: 20 (2022 12:39) (18 - 20)  SpO2: 97% (2022 12:39) (96% - 97%)      Neurological Exam:   Mental status: Awake, Patient slightly somnolent but arousable. Follows simple commands.  Cranial nerves: Pupils equally round and reactive to light, left lateral gaze, extraocular muscles intact, V1 through V3 intact bilaterally and symmetric, face symmetric, tongue was midline.  Motor:  Moves all 3 extremities anti-gravity. L AKA. Normal tone and bulk.  No abnormal movements.    Sensation: Withdraws to painful stimuli.  Coordination: No dysmetria on finger-to-nose and heel-to-shin.  No dysdiadokinesia.  Reflexes: 2+ in bilateral UE/LE, downgoing toes bilaterally.       Labs:  CBC Full  -  ( 2022 12:00 )  WBC Count : 12.73 K/uL  RBC Count : 4.38 M/uL  Hemoglobin : 13.0 g/dL  Hematocrit : 40.6 %  Platelet Count - Automated : 163 K/uL  Mean Cell Volume : 92.7 fL  Mean Cell Hemoglobin : 29.7 pg  Mean Cell Hemoglobin Concentration : 32.0 g/dL  Auto Neutrophil # : 10.67 K/uL  Auto Lymphocyte # : 0.91 K/uL  Auto Monocyte # : 0.80 K/uL  Auto Eosinophil # : 0.29 K/uL  Auto Basophil # : 0.02 K/uL  Auto Neutrophil % : 83.8 %  Auto Lymphocyte % : 7.1 %  Auto Monocyte % : 6.3 %  Auto Eosinophil % : 2.3 %  Auto Basophil % : 0.2 %        142  |  107  |  14  ----------------------------<  152<H>  3.6   |  25  |  0.9    Ca    9.3      2022 12:00  Mg     1.7         TPro  6.6  /  Alb  4.0  /  TBili  0.9  /  DBili  x   /  AST  17  /  ALT  15  /  AlkPhos  107  02-02    LIVER FUNCTIONS - ( 2022 12:00 )  Alb: 4.0 g/dL / Pro: 6.6 g/dL / ALK PHOS: 107 U/L / ALT: 15 U/L / AST: 17 U/L / GGT: x             Urinalysis Basic - ( 2022 12:08 )    Color: Light Yellow / Appearance: Slightly Turbid / S.008 / pH: x  Gluc: x / Ketone: Negative  / Bili: Negative / Urobili: <2 mg/dL   Blood: x / Protein: Trace / Nitrite: Negative   Leuk Esterase: Large / RBC: 8 /HPF /  /HPF   Sq Epi: x / Non Sq Epi: 0 /HPF / Bacteria: Many    
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 Code stroke was initially called, no tpa or intervention. Imaging negative. Family states pt admitted for seizures in November December and January. Son Mr. Velasquez states previous seizures are noted to have the same symptoms as patient presented with today, Left arm "heaviness", "looking to the left." Pt takes vimpat 75 mg BID at home.  (02 Feb 2022 17:58)    RELEVANT INTERVAL EVENTS: The patient was admitted for further management of seizure, incidentally  found to have a very large right renal pelvic staghorn calculus on Ct abdomen and pelvis. Urology recommended IR for placement of PCNT for ultimate outpatient PCNL.       PAST MEDICAL & SURGICAL HISTORY:  Hypertension    Dementia    Chronic GERD    History of TIA (transient ischemic attack)    Seizure disorder    Folate deficiency    Vitamin B12 deficiency    S/P AKA (above knee amputation), left    H/O left nephrectomy        MEDICATIONS  (STANDING):  atorvastatin 40 milliGRAM(s) Oral at bedtime  cefTRIAXone   IVPB 2000 milliGRAM(s) IV Intermittent every 24 hours  chlorhexidine 2% Cloths 1 Application(s) Topical <User Schedule>  haloperidol    Injectable 1 milliGRAM(s) IntraMuscular once  heparin   Injectable 5000 Unit(s) SubCutaneous every 12 hours  lacosamide IVPB 200 milliGRAM(s) IV Intermittent every 12 hours  lactobacillus acidophilus 1 Tablet(s) Oral daily  metoprolol tartrate 25 milliGRAM(s) Oral three times a day  QUEtiapine 25 milliGRAM(s) Oral every 12 hours  senna 2 Tablet(s) Oral at bedtime  tamsulosin 0.8 milliGRAM(s) Oral at bedtime  vancomycin    Solution 125 milliGRAM(s) Oral every 12 hours    MEDICATIONS  (PRN):  acetaminophen     Tablet .. 650 milliGRAM(s) Oral every 6 hours PRN Moderate Pain (4 - 6)  gabapentin 300 milliGRAM(s) Oral daily PRN phantom pain in LLE  LORazepam   Injectable 2 milliGRAM(s) IV Push once PRN seizure  OLANZapine Injectable 2.5 milliGRAM(s) IntraMuscular every 6 hours PRN agitation      Allergies    adhesives (Unknown)  valproic acid (Unknown)    Intolerances        Social History:   Smoking: Yes [ ]  No [ ]   ______pk yrs  ETOH  Yes [ ]  No [ ]  Social [ ]  DRUGS:  Yes [ ]  No [ ]  if so what______________    FAMILY HISTORY:  FH: allergy      Vital Signs Last 24 Hrs  T(C): 36.9 (10 Feb 2022 11:36), Max: 38.3 (09 Feb 2022 20:25)  T(F): 98.5 (10 Feb 2022 11:36), Max: 101 (09 Feb 2022 20:25)  HR: 70 (10 Feb 2022 13:25) (70 - 113)  BP: 111/56 (10 Feb 2022 13:25) (111/56 - 133/66)  BP(mean): 107 (09 Feb 2022 18:14) (107 - 107)  RR: 18 (10 Feb 2022 13:25) (16 - 19)  SpO2: 99% (10 Feb 2022 13:25) (97% - 99%)      Labs:                         12.6   6.40  )-----------( 194      ( 10 Feb 2022 04:30 )             41.5     02-10    144  |  108  |  30<H>  ----------------------------<  109<H>  4.8   |  19  |  1.1    Ca    10.5<H>      10 Feb 2022 04:30  Mg     2.2     02-10    TPro  7.2  /  Alb  4.2  /  TBili  0.3  /  DBili  x   /  AST  18  /  ALT  15  /  AlkPhos  148<H>  02-10        Pertinent labs:                      12.6   6.40  )-----------( 194      ( 10 Feb 2022 04:30 )             41.5       02-10    144  |  108  |  30<H>  ----------------------------<  109<H>  4.8   |  19  |  1.1    Ca    10.5<H>      10 Feb 2022 04:30  Mg     2.2     02-10    TPro  7.2  /  Alb  4.2  /  TBili  0.3  /  DBili  x   /  AST  18  /  ALT  15  /  AlkPhos  148<H>  02-10          Radiology & Additional Studies:   < from: CT Abdomen and Pelvis No Cont (02.09.22 @ 12:27) >  Status post left nephrectomy.    4.6 x 4.7 x 5.1 cm right lower renal pelvic calculus causing mild to  moderate hydronephrosis towards the upper pole.    Multiple smaller right intrarenal calculi some of which are linear with   the appearance of milk of calcium for example on series 4 image 105.    Focus of gas is seen within the bladder on series 2image 63, correlate   for history of instrumentation.    Moderate hiatal hernia.    Relative atrophy of the left gluteal/hip musculature.    --- End of Report ---      < end of copied text >    Radiology imaging reviewed.       ASSESSMENT AND PLAN:    75 yo F with PMHx as above admitted for further management of seizure, incidentally  found to have a very large right renal pelvic staghorn calculus on Ct abdomen and pelvis. Patient has a single kidney (s/p left nephrectomy) with normal range creatinine, normal WBC count at thsi time. Urology recommended IR for placement of PCNT for ultimate outpatient PCNL.     -No indication for acute inpatient PCN placement at this time.  -Patient will need to stop ASA at least 3 days prior to any planned outpatient procedure.  -f/u Urology.    Thank you for the courtesy of this consult, please call [phone] with any questions during regular business hours. Please call [phone] at all other times with emergent questions.   
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 Code stroke was initially called, no tpa or intervention. Imaging negative. Family states pt admitted for seizures in November December and January. Son Mr. Velasquez states previous seizures are noted to have the same symptoms as patient presented with today, Left arm "heaviness", "looking to the left." Pt takes vimpat 75 mg BID at home.  (02 Feb 2022 17:58)      INFECTIOUS DISEASE HISTORY:  History as above.   Presented with seizure activity.   ID consulted for E coli bacteremia, urine Cx also growing E coli,   Son reports repeated hospitlizations for possible UTIs.   Reviewed micro history, Grew E coli in Jan 2022.   She also has history of C diff.  Family reports difficulty in telling when she is feeling unwell. Denies any fevers, nausea, vomiting, abdominal pain.     PAST MEDICAL & SURGICAL HISTORY:  Hypertension    Dementia    Chronic GERD    History of TIA (transient ischemic attack)    Seizure disorder    Folate deficiency    Vitamin B12 deficiency    S/P AKA (above knee amputation), left    H/O left nephrectomy        FAMILY HISTORY  FH: allergy        SOCIAL HISTORY  Social History:  Denies smoking   Denies  ETOH Use  Denies Illicit Drug Use (22 Dec 2021 10:58)        ROS  General: Denies rigors, nightsweats  HEENT: Denies headache, rhinorrhea, sore throat, eye pain  CV: Denies CP, palpitations  PULM: Denies wheezing, hemoptysis  GI: Denies hematemesis, hematochezia, melena  : Denies discharge, hematuria  MSK: Denies arthralgias, myalgias  SKIN: Denies rash, lesions  NEURO: Denies paresthesias, weakness  PSYCH: Denies depression, anxiety    VITALS:  T(F): 97.2, Max: 98.4 (02-04-22 @ 20:32)  HR: 102  BP: 130/70  RR: 17Vital Signs Last 24 Hrs  T(C): 36.2 (05 Feb 2022 06:14), Max: 36.9 (04 Feb 2022 20:32)  T(F): 97.2 (05 Feb 2022 06:14), Max: 98.4 (04 Feb 2022 20:32)  HR: 102 (05 Feb 2022 06:14) (88 - 102)  BP: 130/70 (05 Feb 2022 06:14) (121/56 - 130/70)  BP(mean): 88 (04 Feb 2022 13:32) (88 - 88)  RR: 17 (05 Feb 2022 06:14) (17 - 18)  SpO2: 99% (04 Feb 2022 20:32) (97% - 99%)    PHYSICAL EXAM:  Gen: NAD, resting in bed  HEENT: Normocephalic, atraumatic  Neck: supple, no lymphadenopathy  CV: Regular rate & regular rhythm  Lungs: decreased BS at bases, no fremitus  Abdomen: Soft, BS present  Ext: Warm, well perfused  Neuro: non focal, awake  Skin: no rash, no erythema  Lines: no phlebitis    TESTS & MEASUREMENTS:                        12.6   5.66  )-----------( 181      ( 05 Feb 2022 07:18 )             39.7     02-05    143  |  108  |  9<L>  ----------------------------<  100<H>  4.2   |  24  |  0.8    Ca    10.1      05 Feb 2022 07:18  Phos  2.2     02-04  Mg     1.9     02-05    TPro  6.7  /  Alb  4.1  /  TBili  0.6  /  DBili  x   /  AST  12  /  ALT  11  /  AlkPhos  103  02-05    eGFR if Non African American: 71 mL/min/1.73M2 (02-05-22 @ 07:18)  eGFR if African American: 82 mL/min/1.73M2 (02-05-22 @ 07:18)    LIVER FUNCTIONS - ( 05 Feb 2022 07:18 )  Alb: 4.1 g/dL / Pro: 6.7 g/dL / ALK PHOS: 103 U/L / ALT: 11 U/L / AST: 12 U/L / GGT: x               Culture - Blood (collected 02-02-22 @ 13:45)  Source: .Blood Blood-Peripheral  Gram Stain (02-03-22 @ 09:13):    Growth in aerobic bottle: Gram Negative Rods  Preliminary Report (02-04-22 @ 12:06):    Growth in aerobic bottle: Escherichia coli    ***Blood Panel PCR results on this specimen are available    approximately 3 hours after the Gram stain result.***    Gram stain, PCR, and/or culture results may not always    correspond due to difference in methodologies.    ************************************************************    This PCR assay was performed by multiplex PCR. This    Assay tests for 66 bacterial and resistance gene targets.    Please refer to the [facility] test directory    at [email or web address] for details.  Organism: Blood Culture PCR (02-03-22 @ 11:41)  Organism: Blood Culture PCR (02-03-22 @ 11:41)      -  Escherichia coli: Detec      Method Type: PCR    Culture - Blood (collected 02-02-22 @ 13:45)  Source: .Blood Blood-Peripheral  Preliminary Report (02-03-22 @ 22:02):    No growth to date.    Culture - Urine (collected 02-02-22 @ 12:45)  Source: Catheterized Catheterized  Final Report (02-05-22 @ 08:24):    >100,000 CFU/ml Escherichia coli  Organism: Escherichia coli (02-05-22 @ 08:24)  Organism: Escherichia coli (02-05-22 @ 08:24)      -  Amikacin: S <=16      -  Amoxicillin/Clavulanic Acid: S <=8/4      -  Ampicillin: R >16 These ampicillin results predict results for amoxicillin      -  Ampicillin/Sulbactam: I 16/8 Enterobacter, Klebsiella aerogenes, Citrobacter, and Serratia may develop resistance during prolonged therapy (3-4 days)      -  Aztreonam: S <=4      -  Cefazolin: S 16 (MIC_CL_COM_ENTERIC_CEFAZU) For uncomplicated UTI with K. pneumoniae, E. coli, or P. mirablis: JENI <=16 is sensitive and JENI >=32 is resistant. This also predicts results for oral agents cefaclor, cefdinir, cefpodoxime, cefprozil, cefuroxime axetil, cephalexin and locarbef for uncomplicated UTI. Note that some isolates may be susceptible to these agents while testing resistant to cefazolin.      -  Cefepime: S <=2      -  Cefoxitin: S <=8      -  Ceftriaxone: S <=1 Enterobacter, Klebsiella aerogenes, Citrobacter, and Serratia may develop resistance during prolonged therapy      -  Ciprofloxacin: R >2      -  Ertapenem: S <=0.5      -  Gentamicin: R >8      -  Imipenem: S <=1      -  Levofloxacin: R >4      -  Meropenem: S <=1      -  Nitrofurantoin: S <=32 Should not be used to treat pyelonephritis      -  Piperacillin/Tazobactam: S <=8      -  Tigecycline: S <=2      -  Tobramycin: I 8      -  Trimethoprim/Sulfamethoxazole: R >2/38      Method Type: JENI    Culture - Blood (collected 01-21-22 @ 01:10)  Source: .Blood Blood  Final Report (01-26-22 @ 09:01):    No Growth Final    Culture - Urine (collected 01-19-22 @ 17:20)  Source: Catheterized Catheterized  Final Report (01-23-22 @ 12:28):    >100,000 CFU/ml Escherichia coli  Organism: Escherichia coli (01-23-22 @ 12:28)  Organism: Escherichia coli (01-23-22 @ 12:28)      -  Amikacin: S <=16      -  Amoxicillin/Clavulanic Acid: S <=8/4      -  Ampicillin: R >16 These ampicillin results predict results for amoxicillin      -  Ampicillin/Sulbactam: I 16/8 Enterobacter, Klebsiella aerogenes, Citrobacter, and Serratia may develop resistance during prolonged therapy (3-4 days)      -  Aztreonam: S <=4      -  Cefazolin: S <=2 (MIC_CL_COM_ENTERIC_CEFAZU) For uncomplicated UTI with K. pneumoniae, E. coli, or P. mirablis: JENI <=16 is sensitive and JENI >=32 is resistant. This also predicts results for oral agents cefaclor, cefdinir, cefpodoxime, cefprozil, cefuroxime axetil, cephalexin and locarbef for uncomplicated UTI. Note that some isolates may be susceptible to these agents while testing resistant to cefazolin.      -  Cefepime: S <=2      -  Cefoxitin: S <=8      -  Ceftriaxone: S <=1 Enterobacter, Klebsiella aerogenes, Citrobacter, and Serratia may develop resistance during prolonged therapy      -  Ciprofloxacin: R >2      -  Ertapenem: S <=0.5      -  Gentamicin: R >8      -  Imipenem: S <=1      -  Levofloxacin: R >4      -  Meropenem: S <=1      -  Nitrofurantoin: S <=32 Should not be used to treat pyelonephritis      -  Piperacillin/Tazobactam: S <=8      -  Tigecycline: S <=2      -  Tobramycin: S 4      -  Trimethoprim/Sulfamethoxazole: R >2/38      Method Type: JENI    Culture - Blood (collected 01-19-22 @ 16:00)  Source: .Blood Blood-Peripheral  Final Report (01-25-22 @ 01:00):    No Growth Final    Culture - Urine (collected 12-22-21 @ 19:50)  Source: Clean Catch Clean Catch (Midstream)  Final Report (12-24-21 @ 05:19):    <10,000 CFU/mL Normal Urogenital Marylin    Culture - Blood (collected 11-29-21 @ 05:30)  Source: .Blood Blood  Final Report (12-04-21 @ 14:00):    No Growth Final    Culture - Urine (collected 06-25-21 @ 15:55)  Source: .Urine Clean Catch (Midstream)  Final Report (06-27-21 @ 18:45):    <10,000 CFU/mL Normal Urogenital Marylin        Blood Gas Venous - Lactate: 0.80 mmol/L (02-02-22 @ 15:30)      INFECTIOUS DISEASES TESTING  Procalcitonin, Serum: 0.13 ng/mL (02-03-22 @ 06:55)  COVID-19 PCR: NotDetec (02-02-22 @ 12:20)  Clostridium difficile Toxin by PCR: RESULT INTERPRETATION:    Detected - Clostridium difficile toxin B detected by amplified DNA PCR .    C. difficile PCR test results should be interpreted only with  consideration of the patient's clinical situation and history.  This test  will detect the presence of toxigenic C. difficile.  However it cannot be  used as the sole criteria for the diagnosis of antibiotic associated  diarrhea, antibiotic associated colitis, or pseudomembranous colitis.  Colonization with C. difficile may exceed 20%in hospital patients, the  majority of whom are without Toxigenic Clostridium Difficile disease.  Testing is generally not recommended in children below the age of 1 year,  as up to half of healthy infants are asymptomatically colonized with C.  difficile.  In addition, C. difficile PCR testing cannot be used as a  "test of cure" as dead organism nucleic acids will persist and be  detected after treatment.  Successful treatment of C. difficile disease  is determined by resolution of clinical symptoms. Method: CDPCR  TOXIGENIC CLOST.DIFFICILE POSITIVE;  027-NAP1-B1 PRESUMPTIVE NEGATIVE.  By: Real-Time PCR (Polymerase Chain Reaction)  NOTE: This method detects the Toxin B gene (tCdB), the  binary toxin gene (CDT), and the single-base-pair  deletion at nucleotide 117 within the gene encoding  a negative regulator of toxin production (tcdC 117).  The combined presence of genes encoding Toxin B and  binary toxin and the tcdC 117 deletion has been  associated with hypervirulent C. difficile strain  known as 027/NAP1/B1, which has been associated with  severe disease outbreaks in healthcare facilities  worldwide. (01-21-22 @ 07:20)  MRSA PCR Result.: Negative (01-19-22 @ 17:20)  Procalcitonin, Serum: 0.07 ng/mL (01-19-22 @ 11:00)  Procalcitonin, Serum: 0.06 ng/mL (01-18-22 @ 23:24)  COVID-19 PCR: Detected (01-18-22 @ 21:31)  Clostridium difficile Toxin by PCR: RESULT INTERPRETATION:    Detected - Clostridium difficile toxin B detected by amplified DNA PCR .    C. difficile PCR test results should be interpreted only with  consideration of the patient's clinical situation and history.  This test  will detect the presence of toxigenic C. difficile.  However it cannot be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  3.3<L>   |  23  |  0.8    Ca    9.2      04 Feb 2022 04:30  Phos  2.2     02-04  Mg     2.2     02-04    TPro  5.8<L>  /  Alb  3.4<L>  /  TBili  0.6  /  DBili  x   /  AST  13  /  ALT  10  /  AlkPhos  89  02-04    LIVER FUNCTIONS - ( 04 Feb 2022 04:30 )  Alb: 3.4 g/dL / Pro: 5.8 g/dL / ALK PHOS: 89 U/L / ALT: 10 U/L / AST: 13 U/L / GGT: x                       Culture - Blood (collected 02 Feb 2022 13:45)  Source: .Blood Blood-Peripheral  Gram Stain (03 Feb 2022 09:13):    Growth in aerobic bottle: Gram Negative Rods  Preliminary Report (04 Feb 2022 12:06):    Growth in aerobic bottle: Escherichia coli    ***Blood Panel PCR results on this specimen are available    approximately 3 hours after the Gram stain result.***    Gram stain, PCR, and/or culture results may not always    correspond due to difference in methodologies.    ************************************************************    This PCR assay was performed by multiplex PCR. This    Assay tests for 66 bacterial and resistance gene targets.    Please refer to the [facility] test directory    at [email or web address] for details.  Organism: Blood Culture PCR (03 Feb 2022 11:41)  Organism: Blood Culture PCR (03 Feb 2022 11:41)    Culture - Blood (collected 02 Feb 2022 13:45)  Source: .Blood Blood-Peripheral  Preliminary Report (03 Feb 2022 22:02):    No growth to date.    Culture - Urine (collected 02 Feb 2022 12:45)  Source: Catheterized Catheterized  Preliminary Report (04 Feb 2022 10:43):    >100,000 CFU/ml Gram Negative Rods        EKG - nonspecific changes (my read)  Chart and consultant noted personally reviewed.  Care Discussed with Consultants/Other Providers/ Housestaff [ x] YES [ ] NO   Radiology, labs, old records personally reviewed.
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
## 2022-02-10 NOTE — DISCHARGE NOTE NURSING/CASE MANAGEMENT/SOCIAL WORK - PATIENT PORTAL LINK FT
You can access the FollowMyHealth Patient Portal offered by [facility] by registering at the following website: [email or web address]. By joining [employer]’s FollowMyHealth portal, you will also be able to view your health information using other applications (apps) compatible with our system.
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
## 2022-02-10 NOTE — CONSULT NOTE ADULT - CONSULT REQUESTED DATE/TIME
HOSPITAL PROGRESS NOTE    Date of Service: 2019  LOS:  LOS: 1 day   Patient Name: Swetha Luis  Age/Sex: 57 y.o. female  : 1962  MRN: [id number]   Primary Cardiologist: Dr. Los Gabriel     Subjective:     Chief Complaint/Follow up:  Chest Pain, Dyspnea, Elevated Troponin    Interval History:   She just returned from having her echocardiogram completed.  Denies any further chest pain and shortness of breath.    Objective:     Objective:  Temp:  [94.8 °F (34.9 °C)-97.7 °F (36.5 °C)] 97.7 °F (36.5 °C)  Heart Rate:  [58-73] 65  Resp:  [18] 18  BP: (120-150)/(54-97) 150/93  Body mass index is 25.18 kg/m².    Intake/Output Summary (Last 24 hours) at 2019 1001  Last data filed at 2019 0307  Gross per 24 hour   Intake 100 ml   Output 150 ml   Net -50 ml         19  0211 19  0500 19  0909   Weight: 70.8 kg (156 lb) 70.9 kg (156 lb 6.4 oz) 70.8 kg (156 lb)     Weight change:     Physical Exam:   General Appearance: Alert, cooperative, in no acute distress.  HEENT: Normocephalic. Tracheostomy present.   Neck: Supple. No JVD. No Carotid bruit. No thyromegaly  Lungs: Diminished. Normal respiratory effort and rate.  Heart:: Regular rate and rhythm, normal S1 and S2, no murmurs, gallops or rubs.  Abdomen: Soft, nontender, non-distended. positive bowel sounds. LUQ peg tube.   Extremities: Warm, no cyanosis, or clubbing. No edema.     Lab Review:   Results from last 7 days   Lab Units 19  0521 19  0723  19  2248   SODIUM mmol/L 142 136   < > 136   POTASSIUM mmol/L 4.3 3.7   < > 4.6   CHLORIDE mmol/L 101 98   < > 97*   CO2 mmol/L 30.5* 29.3*   < > 27.4   BUN mg/dL 27* 23*   < > 23*   CREATININE mg/dL 0.94 0.95   < > 1.11*   GLUCOSE mg/dL 86 95   < > 127*   CALCIUM mg/dL 9.1 9.1   < > 9.6   AST (SGOT) U/L  --  28  --  40*   ALT (SGPT) U/L  --  44*  --  61*    < > = values in this interval not displayed.     Results from last 7 days   Lab Units 19  1211 19  0605 
02-Feb-2022 14:29
02-Feb-2022 15:27
09/22/19  0130 09/21/19  2248   TROPONIN T ng/mL 0.045* 0.034* 0.031* 0.036*     Results from last 7 days   Lab Units 09/24/19  0521 09/23/19  0723   WBC 10*3/mm3 7.82 6.74   HEMOGLOBIN g/dL 12.8 12.4   HEMATOCRIT % 41.6 39.9   PLATELETS 10*3/mm3 211 201                 Results from last 7 days   Lab Units 09/21/19  2248   PROBNP pg/mL 269.1           Results for orders placed during the hospital encounter of 12/24/18   Adult Transthoracic Echo Complete W/ Cont if Necessary Per Protocol    Narrative · Left ventricular systolic function is low normal. Calculated EF = 48.0%.   Estimated EF was in disagreement with the calculated EF. Estimated EF =   50%. Normal left ventricular cavity size and wall thickness noted.   Multiple wall segments are not well visualized and cannot rule out wall   motion abnormalities in the basal anterolateral anterior and apical   anterior walls. Left ventricular diastolic dysfunction is noted (grade I)   consistent with impaired relaxation.  · Right ventricular cavity is borderline dilated.  · The aortic valve is abnormal in structure. There is thickening of the   aortic valve. There is moderate nodular sclerosis of non-coronary cusp.   Cannot rule out possible fibroblastoma..  · Mild mitral valve regurgitation is present.  · Trace tricuspid valve regurgitation is present. Insufficient TR velocity   profile to estimate the right ventricular systolic pressure.        I reviewed the patient's new clinical results.  I personally viewed and interpreted the patient's EKG/Telemetry data/Labs/Test Results.     Current Medications:   Scheduled Meds:    aspirin 81 mg Oral Daily   budesonide 0.5 mg Nebulization Daily - RT   busPIRone 5 mg Oral TID   escitalopram 20 mg Oral Daily   gabapentin 300 mg Oral TID   guaiFENesin-dextromethorphan 5 mL Per G Tube 4x Daily   lansoprazole 30 mg Per G Tube QAM   metoprolol succinate XL 25 mg Oral Q24H   nystatin 5 mL Swish & Swallow 4x Daily   nystatin  Topical 
10-Feb-2022 13:00
05-Feb-2022 12:45
06-Feb-2022 01:54
07-Feb-2022 17:23
TID   pramipexole 0.25 mg Oral Nightly   predniSONE 10 mg Per G Tube Daily   sodium chloride 10 mL Intravenous Q12H   thiamine 100 mg Oral Daily       Allergies:  Allergies   Allergen Reactions   • Cephalexin Unknown (See Comments)     unk     • Hydrocodone Unknown (See Comments)     unk   • [place] Unknown (See Comments)     unk   • Zithromax [Azithromycin] Unknown (See Comments)     unk       Assessment:       Dyspnea    Stage 3 chronic kidney disease (CMS/[facility])    COPD (chronic obstructive pulmonary disease) (CMS/[facility])    Respiratory failure, chronic (CMS/[facility])    Chronic diastolic CHF (congestive heart failure) (CMS/[facility])    Elevated LFTs    Elevated troponin    Tracheostomy present (CMS/[facility])    Essential hypertension    Dysphagia        Plan:   Assessment/Plan       1. Chest Pain/Mildy Elevated Troponin Level: She denies any further chest pain.  Echocardiogram showed no wall motion abnormality.  I discussed with Dr. Gabriel and we feel that her troponin is elevated due to chronic hypoxia.  2. Chronic Diastolic Heart Failure: Resolved per echocardiogram.  Normal heart rate today.  3. Sinus Bradycardia: Normal heart rate today.   4. Chronic Kidney Disease: Creatinine normal.   5.  Hypoxia: Appreciate medicine team following.  6.  Cardiology will sign off today.    The plan of care was discussed with Los Gabriel and she agrees.    AMINATA Jackson  [facility]   09/24/19  10:01 AM      
04-Feb-2022

## 2022-02-10 NOTE — PROGRESS NOTE ADULT - SUBJECTIVE AND OBJECTIVE BOX
Patient is a 76y old  Female who presents with a chief complaint of Seizures (10 Feb 2022 15:02)      Patient seen and examined at bedside.    ALLERGIES:  adhesives (Unknown)  valproic acid (Unknown)    MEDICATIONS:  acetaminophen     Tablet .. 650 milliGRAM(s) Oral every 6 hours PRN  atorvastatin 40 milliGRAM(s) Oral at bedtime  cefTRIAXone   IVPB 2000 milliGRAM(s) IV Intermittent every 24 hours  chlorhexidine 2% Cloths 1 Application(s) Topical <User Schedule>  gabapentin 300 milliGRAM(s) Oral daily PRN  haloperidol    Injectable 1 milliGRAM(s) IntraMuscular once  heparin   Injectable 5000 Unit(s) SubCutaneous every 12 hours  lacosamide 200 milliGRAM(s) Oral two times a day  lactobacillus acidophilus 1 Tablet(s) Oral daily  LORazepam   Injectable 2 milliGRAM(s) IV Push once PRN  metoprolol tartrate 25 milliGRAM(s) Oral three times a day  OLANZapine Injectable 2.5 milliGRAM(s) IntraMuscular every 6 hours PRN  QUEtiapine 25 milliGRAM(s) Oral <User Schedule>  QUEtiapine 100 milliGRAM(s) Oral at bedtime  senna 2 Tablet(s) Oral at bedtime  tamsulosin 0.8 milliGRAM(s) Oral at bedtime  vancomycin    Solution 125 milliGRAM(s) Oral every 12 hours    Vital Signs Last 24 Hrs  T(F): 98.5 (10 Feb 2022 11:36), Max: 101 (09 Feb 2022 20:25)  HR: 70 (10 Feb 2022 13:25) (70 - 113)  BP: 111/56 (10 Feb 2022 13:25) (111/56 - 133/66)  RR: 18 (10 Feb 2022 13:25) (16 - 19)  SpO2: 99% (10 Feb 2022 13:25) (97% - 99%)  I&O's Summary    09 Feb 2022 07:01  -  10 Feb 2022 07:00  --------------------------------------------------------  IN: 700 mL / OUT: 200 mL / NET: 500 mL    10 Feb 2022 07:01  -  10 Feb 2022 16:39  --------------------------------------------------------  IN: 120 mL / OUT: 800 mL / NET: -680 mL        PHYSICAL EXAM:  General: NAD, Alert, agitated   ENT: MMM  Neck: Supple, No JVD  Lungs: Clear to auscultation bilaterally, no crackles   Cardio: RRR, S1/S2, No murmurs  Abdomen: Soft, Nontender, Nondistended; Bowel sounds present  Extremities: No cyanosis, No edema    LABS:                        12.6   6.40  )-----------( 194      ( 10 Feb 2022 04:30 )             41.5     02-10    144  |  108  |  30  ----------------------------<  109  4.8   |  19  |  1.1    Ca    10.5      10 Feb 2022 04:30  Mg     2.2     02-10    TPro  7.2  /  Alb  4.2  /  TBili  0.3  /  DBili  x   /  AST  18  /  ALT  15  /  AlkPhos  148  02-10    eGFR if Non African American: 48 mL/min/1.73M2 (02-10-22 @ 04:30)  eGFR if African American: 56 mL/min/1.73M2 (02-10-22 @ 04:30)            11-26 Chol 111 mg/dL LDL -- HDL 47 mg/dL Trig 92 mg/dL                      COVID-19 PCR: Detected (02-09-22 @ 08:51)  COVID-19 PCR: NotDetec (02-02-22 @ 12:20)  COVID-19 PCR: Detected (01-18-22 @ 21:31)      RADIOLOGY & ADDITIONAL TESTS:    Care Discussed with Consultants/Other Providers:

## 2022-02-11 NOTE — PROGRESS NOTE ADULT - ASSESSMENT
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 Downgraded from NCC. Likely seizures 2/2 underlying infection.    Patient experiencing bilateral upper extremity tremors, and left gaze preference.     #Medication  ** c/w quetiapine at 9 PM standing, but change AM dose to PRN for agitation  - c/w vimpat    #Other recommendations  - c/w mgmt of infectious process  - seizure precautions  - keep mg > 2     This is a 75 yo F with PMHx of Alzheimer's dementia, CVA with left sided weakness and left facial droop, Left AKA, seizure d/o, UTI, HTN, HLD presents to the ER for seizure activity in status epilepticus. Downgraded from NCC. Likely seizures 2/2 underlying infection.    #Recs  ** c/w quetiapine at 9 PM standing, but change AM dose to PRN for agitation  * no acute neurological contraindications to discharge  - c/w AEDs: lacosamide, vimpat  - c/w mgmt of infectious process  - seizure precautions  - keep mg > 2     This is a 77 yo F with PMHx of Alzheimer's dementia, CVA with left sided weakness and left facial droop, Left AKA, seizure d/o, UTI, HTN, HLD presents to the ER for seizure activity in status epilepticus. Downgraded from NCC. Likely seizures 2/2 underlying infection.    #Recs  ** c/w quetiapine at 9 PM standing, but change AM dose to PRN for agitation  * no acute neurological contraindications to discharge  - c/w AEDs: lacosamide, lamotrigine   - c/w quetiapine   - c/w mgmt of infectious process  - seizure precautions  - keep mg > 2     This is a 75 yo F with PMHx of Alzheimer's dementia, CVA with left sided weakness and left facial droop, Left AKA, seizure d/o, UTI, HTN, HLD presents to the ER for seizure activity in status epilepticus. Downgraded from NCC. Likely seizures 2/2 underlying infection.    #Recs  ** c/w quetiapine 100 mg at 9 PM standing, but change AM 25 mg dose to PRN for agitation  * no acute neurological contraindications to discharge  - c/w AEDs: lacosamide, lamotrigine   - c/w quetiapine   - c/w mgmt of infectious process  - seizure precautions  - keep mg > 2
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
## 2022-02-11 NOTE — DISCHARGE NOTE PROVIDER - CARE PROVIDERS DIRECT ADDRESSES
,DirectAddress_Unknown,[email or web address],DirectAddress_Unknown

## 2022-02-11 NOTE — DISCHARGE NOTE PROVIDER - NSDCCPCAREPLAN_GEN_ALL_CORE_FT
PRINCIPAL DISCHARGE DIAGNOSIS  Diagnosis: Status epilepticus  Assessment and Plan of Treatment: You were admitted for a seizure that was status epilepticus. You were treated with anti-seizure medication and you will be followed up by neurology on outside basis. Please take all medications as prescribed. Dial 911 if symptoms recurr or present to nearest ED.      SECONDARY DISCHARGE DIAGNOSES  Diagnosis: Acute UTI  Assessment and Plan of Treatment: You had a UTI Ecoli secondary to obstructing stone in the right kidney. You will need to continue antibiotics as prescribed. You will undergo procedures by urology and IR on outside basis. Please follow up.    Diagnosis: Sepsis  Assessment and Plan of Treatment: Your sepsis was secondary to UTI you were treated in the hospital with IV antibiotics. You can continue all antibiotics on outside basis.     PRINCIPAL DISCHARGE DIAGNOSIS  Diagnosis: Status epilepticus  Assessment and Plan of Treatment: You were admitted for a seizure that was status epilepticus. You were seen by neurology and had an EEG.  Your Vinpat medication was increased from 75mg twice a day to 200mg twice a day. You were started on a new seizure medication, Lamotrigine.  You are to take 12.5mg of lamotrigine the first week, 25mg the second week, and 50mg the third week. Please take all medications as prescribed. Dial 911 if symptoms recurr or present to nearest ED.      SECONDARY DISCHARGE DIAGNOSES  Diagnosis: Acute UTI  Assessment and Plan of Treatment: You had a UTI Ecoli secondary to obstructing stone in the right kidney. You will need to continue antibiotics as prescribed. You will undergo procedures by urology and IR on outside basis. Please follow up.    Diagnosis: Sepsis  Assessment and Plan of Treatment: Your sepsis was secondary to UTI you were treated in the hospital with IV antibiotics. You can continue all antibiotics on outside basis.

## 2022-02-11 NOTE — DISCHARGE NOTE PROVIDER - HOSPITAL COURSE
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 Code stroke was initially called, no tpa or intervention. Imaging negative. Family states pt admitted for seizures in November December and January. Son Mr. Velasquez states previous seizures are noted to have the same symptoms as patient presented with today, Left arm "heaviness", "looking to the left." Pt takes vimpat 75 mg BID at home.   02/02/22 Patient admitted, CT head negative, and started on seizure prophylaxis  02/03/22 EEG negative, patient had Ecoli bacteremia, started on Ceftriaxone and voncomycin for C. Diff prophylaxis   02/04/22 Patient connected to video EEG   02/04-07/22 Patient seen by IR and urology regarding kidney stones. Will need procedures on outside basis and to stop aspirin at least 3 days before the procedure.   02/08/2022 Patient had abnormal VEEG results  02/09/2022 Patient tested positive for COVID and transferred to 3A  02/10/22 Patient is on Ceftriaxone for UTI Ecoli, on Vanco for C. Diff prophylaxis, and on lacosamide for Seizure prophylaxis. Follow up with Neuro regarding PO Vimpat and increasing Seroquel.   02/11/22 Patient is stable. Started on lamotrigine 12.5 mg increasing by 12.5 mg each week in divided doses until taking  50 mg twice a day. Continue lacosamide 200 BID, and quetiapine 100 at bed time. Will need jacobs or clean intermittent cath if family able to do it. Will undergo procedure on outside basis. Will continue antibiotics as described.     CT Abdomen Findings:  IMPRESSION:    Status post left nephrectomy.    4.6 x 4.7 x 5.1 cm right lower renal pelvic calculus causing mild to moderate hydronephrosis towards the upper pole.    Multiple smaller right intrarenal calculi some of which are linear with the appearance of milk of calcium for example on series 4 image 105.    Focus of gas is seen within the bladder on series 2 image 63, correlate for history of instrumentation.    Moderate hiatal hernia.    Relative atrophy of the left gluteal/hip musculature. 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 Code stroke was initially called, no tpa or intervention. Imaging negative. Family states pt admitted for seizures in November December and January. Son Mr. Velasquez states previous seizures are noted to have the same symptoms as patient presented with today, Left arm "heaviness", "looking to the left." Pt takes vimpat 75 mg BID at home.   02/02/22 Patient admitted, CT head negative, and started on seizure prophylaxis  02/03/22 EEG negative, patient had Ecoli bacteremia, started on Ceftriaxone and voncomycin for C. Diff prophylaxis   02/04/22 Patient connected to video EEG   02/04-07/22 Patient seen by IR and urology regarding kidney stones. Will need procedures on outside basis and to stop aspirin at least 3 days before the procedure.   02/08/2022 Patient had abnormal VEEG results  02/09/2022 Patient tested positive for COVID and transferred to [facility]A  02/10/22 Patient is on Ceftriaxone for UTI Ecoli, on Vanco for C. Diff prophylaxis, and on lacosamide for Seizure prophylaxis. Follow up with Neuro regarding PO Vimpat and increasing Seroquel.   02/11/22 Patient is stable. Started on lamotrigine 12.5 mg increasing by 12.5 mg each week in divided doses until taking  50 mg at night. Continue lacosamide 200 BID, and quetiapine 100 at bed time. Will need jacobs or clean intermittent cath if family able to do it. Will undergo procedure on outside basis. Will continue antibiotics as described.     CT Abdomen Findings:  IMPRESSION:    Status post left nephrectomy.    4.6 x 4.7 x 5.1 cm right lower renal pelvic calculus causing mild to moderate hydronephrosis towards the upper pole.    Multiple smaller right intrarenal calculi some of which are linear with the appearance of milk of calcium for example on series 4 image 105.    Focus of gas is seen within the bladder on series 2 image 63, correlate for history of instrumentation.    Moderate hiatal hernia.    Relative atrophy of the left gluteal/hip musculature.  I have personally seen and examined patient on the above date.  I discussed the case with Dr. Mark and I agree with findings and plan as detailed per note above, which I have amended where appropriate.

## 2022-02-11 NOTE — PROGRESS NOTE ADULT - REASON FOR ADMISSION
Seizures

## 2022-02-11 NOTE — DISCHARGE NOTE PROVIDER - NSDCFUADDAPPT_GEN_ALL_CORE_FT
Please follow up with your neurologist.   Take the medication as we discussed in person and as prescribed.   Urology will call and schedule an appointment for them and IR.   Discontinue aspirin atleast 3 days before the procedure.  Please follow up with your neurologist.   Take the medication as we discussed in person and as prescribed.   Urology will call and schedule an appointment for them and IR.   Discontinue aspirin at least 3 days before the procedure.

## 2022-02-11 NOTE — DISCHARGE NOTE PROVIDER - CARE PROVIDER_API CALL
Satish Wilson  Internal Medicine  N  [facility], Phys,    Phone: [phone]  Fax: [phone]  Established Patient  Follow Up Time: 2 weeks    Tyrell Najera)  Neurology  [address] [place] [address], [facility] [address]  [place] [address]  Phone: [phone]  Fax: [phone]  Follow Up Time: 2 weeks    Jose Escalera)  Urology  [address] [place] [address]  Phone: [phone]  Fax: [phone]  Follow Up Time: 2 weeks

## 2022-02-11 NOTE — DISCHARGE NOTE PROVIDER - NSDCMRMEDTOKEN_GEN_ALL_CORE_FT
Aspirin Low Dose 81 mg oral delayed release tablet: 81 tab(s) orally once a day   folic acid 1 mg oral tablet: 1 tab(s) orally once a day  Lipitor 40 mg oral tablet: 1 tab(s) orally once a day  Metoprolol Tartrate 25 mg oral tablet: 1 tab(s) orally once a day  Norvasc 5 mg oral tablet: 1 tab(s) orally once a day  Vitamin B-12 500 mcg oral tablet: 1 tab(s) orally once a day    Aspirin Low Dose 81 mg oral delayed release tablet: 81 tab(s) orally once a day   cefpodoxime 200 mg oral tablet: 1 tab(s) orally 2 times a day until 02/15  folic acid 1 mg oral tablet: 1 tab(s) orally once a day  lacosamide 200 mg oral tablet: 1 tab(s) orally 2 times a day MDD:400 mg  lamoTRIgine 25 mg oral tablet: 0.5 tab(s) orally once a day (at bedtime) from 02/11 to 02/17  lamoTRIgine 25 mg oral tablet: 2 tab(s) orally once a day (at bedtime) from 02/25 onward  lamoTRIgine 25 mg oral tablet: 1 tab(s) orally once a day (at bedtime) from 02/18-02/24  Lipitor 40 mg oral tablet: 1 tab(s) orally once a day  Macrobid 100 mg oral capsule: 1 cap(s) orally once a day starting 02/16  Metoprolol Tartrate 25 mg oral tablet: 1 tab(s) orally once a day  Norvasc 5 mg oral tablet: 1 tab(s) orally once a day  QUEtiapine 100 mg oral tablet: 1 tab(s) orally once a day (at bedtime) MDD:125 mg  QUEtiapine 25 mg oral tablet: 1 tab(s) orally once a day MDD:125 mg  vancomycin 125 mg oral capsule: 1 cap(s) orally 2 times a day until 02/20  Vitamin B-12 500 mcg oral tablet: 1 tab(s) orally once a day

## 2022-02-11 NOTE — PROGRESS NOTE ADULT - NUTRITIONAL ASSESSMENT
This patient has been assessed with a concern for Malnutrition and has been determined to have a diagnosis/diagnoses of Moderate protein-calorie malnutrition.    This patient is being managed with:   Diet DASH/TLC-  Sodium & Cholesterol Restricted  Soft and Bite Sized (SOFTBTSZ)  Supplement Feeding Modality:  Oral  Ensure Enlive Cans or Servings Per Day:  1       Frequency:  Three Times a day  Entered: Feb 5 2022 12:03PM    
This patient has been assessed with a concern for Malnutrition and has been determined to have a diagnosis/diagnoses of Moderate protein-calorie malnutrition.    This patient is being managed with:   Diet Soft and Bite Sized-  Entered: Feb  3 2022 11:52AM    The following pending diet order is being considered for treatment of Moderate protein-calorie malnutrition:  Diet DASH/TLC-  Sodium & Cholesterol Restricted  Soft and Bite Sized (SOFTBTSZ)  Supplement Feeding Modality:  Oral  Ensure Enlive Cans or Servings Per Day:  1       Frequency:  Three Times a day  Entered: Feb 5 2022 12:03PM  
This patient has been assessed with a concern for Malnutrition and has been determined to have a diagnosis/diagnoses of Moderate protein-calorie malnutrition.    This patient is being managed with:   Diet DASH/TLC-  Sodium & Cholesterol Restricted  Soft and Bite Sized (SOFTBTSZ)  Supplement Feeding Modality:  Oral  Ensure Enlive Cans or Servings Per Day:  1       Frequency:  Three Times a day  Entered: Feb 5 2022 12:03PM    
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
## 2022-02-11 NOTE — PROGRESS NOTE ADULT - ATTENDING COMMENTS
Pt examined 2/11/22 with daughter in room.  She was wide awake and periodically attempting to get out of bed.  (this despite having received ativan last night).  Daughter indicates they would not typically give the am seroquel for this but rather only if she was physically violent - swinging at them. Pt examined 2/11/22 with daughter in room.  She was wide awake and periodically attempting to get out of bed.  (this despite having received ativan last night).  Daughter indicates they would not typically give the am seroquel for this but rather only if she was physically violent - swinging at them.  She is tolerating the 12.5 mg lamotrigine pm dose and will increase to 25 mg XR daily x 1 week then 25 mg XR twice a day.

## 2022-02-11 NOTE — PROGRESS NOTE ADULT - PROVIDER SPECIALTY LIST ADULT
Hospitalist
Internal Medicine
Hospitalist
Neurology
Hospitalist
Hospitalist
Internal Medicine
Internal Medicine
Neurology
Critical Care
Internal Medicine
Neurology
Urology
Infectious Disease
Hospitalist
Hospitalist

## 2022-02-11 NOTE — PROGRESS NOTE ADULT - SUBJECTIVE AND OBJECTIVE BOX
THIS IS AN INCOMPLETE NOTE . FULL NOTE TO FOLLOW SHORTLY     Neurology  Progress Note  22    Name:  DANISHA JIMÉNEZ; 76y    Interval History: Patient received 1x dose of ativan IM 2mg at 12:10 AM. Patient seen and examined at bedside.        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 Code stroke was initially called, no tpa or intervention. Imaging negative. Family states pt admitted for seizures in  and January. Son Mr. Velasquez states previous seizures are noted to have the same symptoms as patient presented with today, Left arm "heaviness", "looking to the left." Pt takes vimpat 75 mg BID at home.  (2022 17:58)    REVIEW OF SYSTEMS:  As states in HPI.    Medications:  acetaminophen     Tablet .. 650 milliGRAM(s) Oral every 6 hours PRN  acetaminophen  Suppository .. 975 milliGRAM(s) Rectal once  atorvastatin 40 milliGRAM(s) Oral at bedtime  cefTRIAXone   IVPB 1000 milliGRAM(s) IV Intermittent once  cefTRIAXone   IVPB 2000 milliGRAM(s) IV Intermittent every 24 hours  chlorhexidine 2% Cloths 1 Application(s) Topical <User Schedule>  gabapentin 300 milliGRAM(s) Oral daily PRN  haloperidol    Injectable 1 milliGRAM(s) IntraMuscular once  heparin   Injectable 5000 Unit(s) SubCutaneous every 12 hours  lacosamide 200 milliGRAM(s) Oral two times a day  lacosamide IVPB 50 milliGRAM(s) IV Intermittent once  lactobacillus acidophilus 1 Tablet(s) Oral daily  levETIRAcetam  IVPB 2000 milliGRAM(s) IV Intermittent once  LORazepam   Injectable 2 milliGRAM(s) IV Push once  LORazepam   Injectable 2 milliGRAM(s) IV Push once  LORazepam   Injectable 2 milliGRAM(s) IV Push once PRN  magnesium sulfate  IVPB 2 Gram(s) IV Intermittent once  metoprolol tartrate 25 milliGRAM(s) Oral three times a day  OLANZapine Injectable 2.5 milliGRAM(s) IntraMuscular every 6 hours PRN  potassium chloride    Tablet ER 20 milliEquivalent(s) Oral every 2 hours  potassium chloride   Powder 40 milliEquivalent(s) Oral once  potassium chloride  20 mEq/100 mL IVPB 20 milliEquivalent(s) IV Intermittent once  QUEtiapine 25 milliGRAM(s) Oral <User Schedule>  QUEtiapine 100 milliGRAM(s) Oral at bedtime  senna 2 Tablet(s) Oral at bedtime  sodium chloride 0.9% Bolus 500 milliLiter(s) IV Bolus once  tamsulosin 0.8 milliGRAM(s) Oral at bedtime  vancomycin    Solution 125 milliGRAM(s) Oral every 12 hours  vancomycin  IVPB 750 milliGRAM(s) IV Intermittent once    Vitals:  T(C): 36.1 (22 @ 05:38), Max: 36.9 (02-10-22 @ 11:36)  HR: 83 (22 @ 05:38) (70 - 113)  BP: 132/77 (22 @ 05:38) (111/56 - 136/63)  RR: 18 (22 @ 05:38) (18 - 19)  SpO2: 99% (22 @ 05:38) (97% - 99%)        Labs:                        12.6   6.40  )-----------( 194      ( 10 Feb 2022 04:30 )             41.5     WBC Count: 6.40 K/uL (02-10 @ 04:30)  WBC Count: 6.30 K/uL ( @ 06:00)  WBC Count: 6.02 K/uL ( @ 04:30)  WBC Count: 4.65 K/uL ( @ 08:33)  WBC Count: 5.66 K/uL ( @ 07:18)      02-10    144  |  108  |  30<H>  ----------------------------<  109<H>  4.8   |  19  |  1.1    Ca    10.5<H>      10 Feb 2022 04:30  Mg     2.2     02-10    TPro  7.2  /  Alb  4.2  /  TBili  0.3  /  DBili  x   /  AST  18  /  ALT  15  /  AlkPhos  148<H>  02-10    CAPILLARY BLOOD GLUCOSE      POCT Blood Glucose.: 107 mg/dL (2022 07:34)    LIVER FUNCTIONS - ( 10 Feb 2022 04:30 )  Alb: 4.2 g/dL / Pro: 7.2 g/dL / ALK PHOS: 148 U/L / ALT: 15 U/L / AST: 18 U/L / GGT: x               CSF:      Neuro  Eyes: Conjunctiva and sclera clear.  Cranial nerves: Pupils equally round and reactive to light, extraocular muscles intact, V1 through V3 intact bilaterally and symmetric, face symmetric, tongue was midline.  Motor: Moves all 3 extremities anti-gravity. L AKA. Normal tone and bulk.  No abnormal movements.    Sensation: Intact to light touch, pain, temperature and vibration.  Coordination: No dysmetria on finger-to-nose and heel-to-shin.   Reflexes: 2+ in bilateral UE/LE, downgoing toes bilaterally.       VEEG 24 hours: 22    Background - continuous, less than optimally organized, reaching frequencies in the range of 6-7Hz    Focal and generalized slowin. mild to moderate generalized slowing  2. Moderate bilateral right significantly more than left posterior quadrants focal slowing    Interictal activity - independent right >> left sharp waves and spike and after going slow    Events - none    Seizures - none    Impression: Abnormal VEEG as above    Plan - as per neurology team    < from: CT Brain Stroke Protocol (22 @ 12:16) >  IMPRESSION:    No acute intracranial pathology, stable exam since 2022.    Stable severe chronic microvascular ischemic changes. Recommend further   evaluation with MRI if there is continued clinical concern for   superimposed small acute infarct.    Communication: The summary of above findings were discussed with readback   confirmation with TORSTEN Ramesh by radiologist Dr. Lemus on 2022 at 12:22   PM.    --- End of Report ---      < end of copied text >  < from: CT Head No Cont (22 @ 19:40) >  Impression:    No CT evidence of acute intracranial injury.    Severe chronic microvascular ischemic changes.    No significant change since 2021    --- End of Report ---    < end of copied text >   Neurology  Progress Note  22    Name:  DANISHA JIMÉNEZ; 76y    Interval History: Patient received 1x dose of ativan IM 2mg at 12:10 AM. Patient seen and examined at bedside.     Patient reportedly planned for discharge today.       HPI:  This is a 75 yo F with PMHx of Alzheimer's dementia, 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 Code stroke was initially called, no tpa or intervention. Imaging negative. Family states pt admitted for seizures in  and January. Son Mr. Velasquez states previous seizures are noted to have the same symptoms as patient presented with today, Left arm "heaviness", "looking to the left." Pt takes vimpat 75 mg BID at home.  (2022 17:58)    REVIEW OF SYSTEMS:  As states in HPI.    Medications:  acetaminophen     Tablet .. 650 milliGRAM(s) Oral every 6 hours PRN  acetaminophen  Suppository .. 975 milliGRAM(s) Rectal once  atorvastatin 40 milliGRAM(s) Oral at bedtime  cefTRIAXone   IVPB 1000 milliGRAM(s) IV Intermittent once  cefTRIAXone   IVPB 2000 milliGRAM(s) IV Intermittent every 24 hours  chlorhexidine 2% Cloths 1 Application(s) Topical <User Schedule>  gabapentin 300 milliGRAM(s) Oral daily PRN  haloperidol    Injectable 1 milliGRAM(s) IntraMuscular once  heparin   Injectable 5000 Unit(s) SubCutaneous every 12 hours  lacosamide 200 milliGRAM(s) Oral two times a day  lacosamide IVPB 50 milliGRAM(s) IV Intermittent once  lactobacillus acidophilus 1 Tablet(s) Oral daily  levETIRAcetam  IVPB 2000 milliGRAM(s) IV Intermittent once  LORazepam   Injectable 2 milliGRAM(s) IV Push once  LORazepam   Injectable 2 milliGRAM(s) IV Push once  LORazepam   Injectable 2 milliGRAM(s) IV Push once PRN  magnesium sulfate  IVPB 2 Gram(s) IV Intermittent once  metoprolol tartrate 25 milliGRAM(s) Oral three times a day  OLANZapine Injectable 2.5 milliGRAM(s) IntraMuscular every 6 hours PRN  potassium chloride    Tablet ER 20 milliEquivalent(s) Oral every 2 hours  potassium chloride   Powder 40 milliEquivalent(s) Oral once  potassium chloride  20 mEq/100 mL IVPB 20 milliEquivalent(s) IV Intermittent once  QUEtiapine 25 milliGRAM(s) Oral <User Schedule>  QUEtiapine 100 milliGRAM(s) Oral at bedtime  senna 2 Tablet(s) Oral at bedtime  sodium chloride 0.9% Bolus 500 milliLiter(s) IV Bolus once  tamsulosin 0.8 milliGRAM(s) Oral at bedtime  vancomycin    Solution 125 milliGRAM(s) Oral every 12 hours  vancomycin  IVPB 750 milliGRAM(s) IV Intermittent once    Vitals:  T(C): 36.1 (22 @ 05:38), Max: 36.9 (02-10-22 @ 11:36)  HR: 83 (22 @ 05:38) (70 - 113)  BP: 132/77 (22 @ 05:38) (111/56 - 136/63)  RR: 18 (22 @ 05:38) (18 - 19)  SpO2: 99% (22 @ 05:38) (97% - 99%)        Labs:                        12.6   6.40  )-----------( 194      ( 10 Feb 2022 04:30 )             41.5     WBC Count: 6.40 K/uL (02-10 @ 04:30)  WBC Count: 6.30 K/uL ( @ 06:00)  WBC Count: 6.02 K/uL ( @ 04:30)  WBC Count: 4.65 K/uL ( @ 08:33)  WBC Count: 5.66 K/uL ( @ 07:18)      02-10    144  |  108  |  30<H>  ----------------------------<  109<H>  4.8   |  19  |  1.1    Ca    10.5<H>      10 Feb 2022 04:30  Mg     2.2     02-10    TPro  7.2  /  Alb  4.2  /  TBili  0.3  /  DBili  x   /  AST  18  /  ALT  15  /  AlkPhos  148<H>  02-10    CAPILLARY BLOOD GLUCOSE      POCT Blood Glucose.: 107 mg/dL (2022 07:34)    LIVER FUNCTIONS - ( 10 Feb 2022 04:30 )  Alb: 4.2 g/dL / Pro: 7.2 g/dL / ALK PHOS: 148 U/L / ALT: 15 U/L / AST: 18 U/L / GGT: x               CSF:      Neuro  Eyes: Conjunctiva and sclera clear.  Cranial nerves: Pupils equally round and reactive to light, extraocular muscles intact, V1 through V3 intact bilaterally and symmetric, face symmetric, tongue was midline.  Motor: Moves all 3 extremities anti-gravity. L AKA. Normal tone and bulk.  No abnormal movements.    Sensation: Intact to light touch, pain, temperature and vibration.  Coordination: No dysmetria on finger-to-nose and heel-to-shin.   Reflexes: 2+ in bilateral UE/LE, downgoing toes bilaterally.       VEEG 24 hours: 22    Background - continuous, less than optimally organized, reaching frequencies in the range of 6-7Hz    Focal and generalized slowin. mild to moderate generalized slowing  2. Moderate bilateral right significantly more than left posterior quadrants focal slowing    Interictal activity - independent right >> left sharp waves and spike and after going slow    Events - none    Seizures - none    Impression: Abnormal VEEG as above    Plan - as per neurology team    < from: CT Brain Stroke Protocol (22 @ 12:16) >  IMPRESSION:    No acute intracranial pathology, stable exam since 2022.    Stable severe chronic microvascular ischemic changes. Recommend further   evaluation with MRI if there is continued clinical concern for   superimposed small acute infarct.    Communication: The summary of above findings were discussed with readback   confirmation with TORSTEN Ramesh by radiologist Dr. Lemus on 2022 at 12:22   PM.    --- End of Report ---      < end of copied text >  < from: CT Head No Cont (22 @ 19:40) >  Impression:    No CT evidence of acute intracranial injury.    Severe chronic microvascular ischemic changes.    No significant change since 2021    --- End of Report ---    < end of copied text >   Neurology  Progress Note  22    Name:  DANISHA JIMÉNEZ; 76y    Interval History: Patient received 1x dose of ativan IM 2mg at 12:10 AM. Patient seen and examined at bedside. Patient appears comfortable, no acute distress.     Patient reportedly planned for discharge today.       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 Code stroke was initially called, no tpa or intervention. Imaging negative. Family states pt admitted for seizures in  and January. Son Mr. Velasquez states previous seizures are noted to have the same symptoms as patient presented with today, Left arm "heaviness", "looking to the left." Pt takes vimpat 75 mg BID at home.  (2022 17:58)    REVIEW OF SYSTEMS:  As states in HPI.    Medications:  acetaminophen     Tablet .. 650 milliGRAM(s) Oral every 6 hours PRN  acetaminophen  Suppository .. 975 milliGRAM(s) Rectal once  atorvastatin 40 milliGRAM(s) Oral at bedtime  cefTRIAXone   IVPB 1000 milliGRAM(s) IV Intermittent once  cefTRIAXone   IVPB 2000 milliGRAM(s) IV Intermittent every 24 hours  chlorhexidine 2% Cloths 1 Application(s) Topical <User Schedule>  gabapentin 300 milliGRAM(s) Oral daily PRN  haloperidol    Injectable 1 milliGRAM(s) IntraMuscular once  heparin   Injectable 5000 Unit(s) SubCutaneous every 12 hours  lacosamide 200 milliGRAM(s) Oral two times a day  lacosamide IVPB 50 milliGRAM(s) IV Intermittent once  lactobacillus acidophilus 1 Tablet(s) Oral daily  levETIRAcetam  IVPB 2000 milliGRAM(s) IV Intermittent once  LORazepam   Injectable 2 milliGRAM(s) IV Push once  LORazepam   Injectable 2 milliGRAM(s) IV Push once  LORazepam   Injectable 2 milliGRAM(s) IV Push once PRN  magnesium sulfate  IVPB 2 Gram(s) IV Intermittent once  metoprolol tartrate 25 milliGRAM(s) Oral three times a day  OLANZapine Injectable 2.5 milliGRAM(s) IntraMuscular every 6 hours PRN  potassium chloride    Tablet ER 20 milliEquivalent(s) Oral every 2 hours  potassium chloride   Powder 40 milliEquivalent(s) Oral once  potassium chloride  20 mEq/100 mL IVPB 20 milliEquivalent(s) IV Intermittent once  QUEtiapine 25 milliGRAM(s) Oral <User Schedule>  QUEtiapine 100 milliGRAM(s) Oral at bedtime  senna 2 Tablet(s) Oral at bedtime  sodium chloride 0.9% Bolus 500 milliLiter(s) IV Bolus once  tamsulosin 0.8 milliGRAM(s) Oral at bedtime  vancomycin    Solution 125 milliGRAM(s) Oral every 12 hours  vancomycin  IVPB 750 milliGRAM(s) IV Intermittent once    Vitals:  T(C): 36.1 (22 @ 05:38), Max: 36.9 (02-10-22 @ 11:36)  HR: 83 (22 @ 05:38) (70 - 113)  BP: 132/77 (22 @ 05:38) (111/56 - 136/63)  RR: 18 (22 @ 05:38) (18 - 19)  SpO2: 99% (22 @ 05:38) (97% - 99%)        Labs:                        12.6   6.40  )-----------( 194      ( 10 Feb 2022 04:30 )             41.5     WBC Count: 6.40 K/uL (02-10 @ 04:30)  WBC Count: 6.30 K/uL ( @ 06:00)  WBC Count: 6.02 K/uL ( @ 04:30)  WBC Count: 4.65 K/uL ( @ 08:33)  WBC Count: 5.66 K/uL ( @ 07:18)      02-10    144  |  108  |  30<H>  ----------------------------<  109<H>  4.8   |  19  |  1.1    Ca    10.5<H>      10 Feb 2022 04:30  Mg     2.2     02-10    TPro  7.2  /  Alb  4.2  /  TBili  0.3  /  DBili  x   /  AST  18  /  ALT  15  /  AlkPhos  148<H>  02-10    CAPILLARY BLOOD GLUCOSE      POCT Blood Glucose.: 107 mg/dL (2022 07:34)    LIVER FUNCTIONS - ( 10 Feb 2022 04:30 )  Alb: 4.2 g/dL / Pro: 7.2 g/dL / ALK PHOS: 148 U/L / ALT: 15 U/L / AST: 18 U/L / GGT: x               CSF:      Neuro  Eyes: Conjunctiva and sclera clear.  Cranial nerves: Pupils equally round and reactive to light, extraocular muscles intact, V1 through V3 intact bilaterally and symmetric, face symmetric, tongue was midline.  Motor: Moves all 3 extremities anti-gravity. L AKA. Normal tone and bulk.  No abnormal movements.    Sensation: Intact to light touch, pain, temperature and vibration.  Coordination: No dysmetria on finger-to-nose and heel-to-shin.   Reflexes: 2+ in bilateral UE/LE, downgoing toes bilaterally.       VEEG 24 hours: 22    Background - continuous, less than optimally organized, reaching frequencies in the range of 6-7Hz    Focal and generalized slowin. mild to moderate generalized slowing  2. Moderate bilateral right significantly more than left posterior quadrants focal slowing    Interictal activity - independent right >> left sharp waves and spike and after going slow    Events - none    Seizures - none    Impression: Abnormal VEEG as above    Plan - as per neurology team    < from: CT Brain Stroke Protocol (22 @ 12:16) >  IMPRESSION:    No acute intracranial pathology, stable exam since 2022.    Stable severe chronic microvascular ischemic changes. Recommend further   evaluation with MRI if there is continued clinical concern for   superimposed small acute infarct.    Communication: The summary of above findings were discussed with readback   confirmation with TORSTEN Ramesh by radiologist Dr. Lemus on 2022 at 12:22   PM.    --- End of Report ---      < end of copied text >  < from: CT Head No Cont (22 @ 19:40) >  Impression:    No CT evidence of acute intracranial injury.    Severe chronic microvascular ischemic changes.    No significant change since 2021    --- End of Report ---    < end of copied text >   Neurology  Progress Note  22    Name:  DANISHA JIMÉNEZ; 76y    Interval History: Patient received 1x dose of ativan IM 2mg at 12:10 AM. Patient seen and examined at bedside. Patient appears comfortable, no acute distress.     Patient reportedly planned for discharge today.       HPI:  This is a 75 yo F with PMHx of Alzheimer's dementia, 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 Code stroke was initially called, no tpa or intervention. Imaging negative. Family states pt admitted for seizures in  and January. Son Mr. Velasquez states previous seizures are noted to have the same symptoms as patient presented with today, Left arm "heaviness", "looking to the left." Pt takes vimpat 75 mg BID at home.  (2022 17:58)    REVIEW OF SYSTEMS:  As states in HPI.    Medications:  acetaminophen     Tablet .. 650 milliGRAM(s) Oral every 6 hours PRN  acetaminophen  Suppository .. 975 milliGRAM(s) Rectal once  atorvastatin 40 milliGRAM(s) Oral at bedtime  cefTRIAXone   IVPB 1000 milliGRAM(s) IV Intermittent once  cefTRIAXone   IVPB 2000 milliGRAM(s) IV Intermittent every 24 hours  chlorhexidine 2% Cloths 1 Application(s) Topical <User Schedule>  gabapentin 300 milliGRAM(s) Oral daily PRN  haloperidol    Injectable 1 milliGRAM(s) IntraMuscular once  heparin   Injectable 5000 Unit(s) SubCutaneous every 12 hours  lacosamide 200 milliGRAM(s) Oral two times a day  lacosamide IVPB 50 milliGRAM(s) IV Intermittent once  lactobacillus acidophilus 1 Tablet(s) Oral daily  levETIRAcetam  IVPB 2000 milliGRAM(s) IV Intermittent once  LORazepam   Injectable 2 milliGRAM(s) IV Push once  LORazepam   Injectable 2 milliGRAM(s) IV Push once  LORazepam   Injectable 2 milliGRAM(s) IV Push once PRN  magnesium sulfate  IVPB 2 Gram(s) IV Intermittent once  metoprolol tartrate 25 milliGRAM(s) Oral three times a day  OLANZapine Injectable 2.5 milliGRAM(s) IntraMuscular every 6 hours PRN  potassium chloride    Tablet ER 20 milliEquivalent(s) Oral every 2 hours  potassium chloride   Powder 40 milliEquivalent(s) Oral once  potassium chloride  20 mEq/100 mL IVPB 20 milliEquivalent(s) IV Intermittent once  QUEtiapine 25 milliGRAM(s) Oral <User Schedule>  QUEtiapine 100 milliGRAM(s) Oral at bedtime  senna 2 Tablet(s) Oral at bedtime  sodium chloride 0.9% Bolus 500 milliLiter(s) IV Bolus once  tamsulosin 0.8 milliGRAM(s) Oral at bedtime  vancomycin    Solution 125 milliGRAM(s) Oral every 12 hours  vancomycin  IVPB 750 milliGRAM(s) IV Intermittent once    Vitals:  T(C): 36.1 (22 @ 05:38), Max: 36.9 (02-10-22 @ 11:36)  HR: 83 (22 @ 05:38) (70 - 113)  BP: 132/77 (22 @ 05:38) (111/56 - 136/63)  RR: 18 (22 @ 05:38) (18 - 19)  SpO2: 99% (22 @ 05:38) (97% - 99%)        Labs:                        12.6   6.40  )-----------( 194      ( 10 Feb 2022 04:30 )             41.5     WBC Count: 6.40 K/uL (02-10 @ 04:30)  WBC Count: 6.30 K/uL ( @ 06:00)  WBC Count: 6.02 K/uL ( @ 04:30)  WBC Count: 4.65 K/uL ( @ 08:33)  WBC Count: 5.66 K/uL ( @ 07:18)      02-10    144  |  108  |  30<H>  ----------------------------<  109<H>  4.8   |  19  |  1.1    Ca    10.5<H>      10 Feb 2022 04:30  Mg     2.2     02-10    TPro  7.2  /  Alb  4.2  /  TBili  0.3  /  DBili  x   /  AST  18  /  ALT  15  /  AlkPhos  148<H>  02-10    CAPILLARY BLOOD GLUCOSE      POCT Blood Glucose.: 107 mg/dL (2022 07:34)    LIVER FUNCTIONS - ( 10 Feb 2022 04:30 )  Alb: 4.2 g/dL / Pro: 7.2 g/dL / ALK PHOS: 148 U/L / ALT: 15 U/L / AST: 18 U/L / GGT: x               CSF:      Neuro  Eyes: Conjunctiva and sclera clear.  Cranial nerves: Pupils equally round and reactive to light, extraocular muscles intact, V1 through V3 intact bilaterally and symmetric, face symmetric, tongue was midline.  Motor: Moves all 3 extremities anti-gravity. L AKA. Normal tone and bulk.  No abnormal movements.    Sensation: Intact to light touch, pain, temperature and vibration.  Coordination: No dysmetria on finger-to-nose and heel-to-shin.   Reflexes: 2+ UE/LE, downgoing toes.       VEEG 24 hours: 22    Background - continuous, less than optimally organized, reaching frequencies in the range of 6-7Hz    Focal and generalized slowin. mild to moderate generalized slowing  2. Moderate bilateral right significantly more than left posterior quadrants focal slowing    Interictal activity - independent right >> left sharp waves and spike and after going slow    Events - none    Seizures - none    Impression: Abnormal VEEG as above    Plan - as per neurology team    < from: CT Brain Stroke Protocol (22 @ 12:16) >  IMPRESSION:    No acute intracranial pathology, stable exam since 2022.    Stable severe chronic microvascular ischemic changes. Recommend further   evaluation with MRI if there is continued clinical concern for   superimposed small acute infarct.    Communication: The summary of above findings were discussed with readback   confirmation with TORSTEN Ramesh by radiologist Dr. Lemus on 2022 at 12:22   PM.    --- End of Report ---      < end of copied text >  < from: CT Head No Cont (22 @ 19:40) >  Impression:    No CT evidence of acute intracranial injury.    Severe chronic microvascular ischemic changes.    No significant change since 2021    --- End of Report ---    < end of copied text >   Neurology  Progress Note  22    Name:  DANISHA JIMÉNEZ; 76y    Interval History: Patient received 1x dose of ativan IM 2mg at 12:10 AM. Patient seen and examined at bedside. Patient appears comfortable, no acute distress.     Patient reportedly planned for discharge today.       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 Code stroke was initially called, no tpa or intervention. Imaging negative. Family states pt admitted for seizures in  and January. Son Mr. Velasquez states previous seizures are noted to have the same symptoms as patient presented with today, Left arm "heaviness", "looking to the left." Pt takes vimpat 75 mg BID at home.  (2022 17:58)    REVIEW OF SYSTEMS:  As states in HPI.    MEDICATIONS  (STANDING):  aspirin  chewable 81 milliGRAM(s) Oral daily  atorvastatin 40 milliGRAM(s) Oral at bedtime  cefTRIAXone   IVPB 2000 milliGRAM(s) IV Intermittent every 24 hours  chlorhexidine 2% Cloths 1 Application(s) Topical <User Schedule>  haloperidol    Injectable 1 milliGRAM(s) IntraMuscular once  heparin   Injectable 5000 Unit(s) SubCutaneous every 12 hours  lacosamide 200 milliGRAM(s) Oral two times a day  lactobacillus acidophilus 1 Tablet(s) Oral daily  lamoTRIgine 12.5 milliGRAM(s) Oral daily  metoprolol tartrate 25 milliGRAM(s) Oral three times a day  QUEtiapine 25 milliGRAM(s) Oral <User Schedule>  QUEtiapine 100 milliGRAM(s) Oral at bedtime  senna 2 Tablet(s) Oral at bedtime  tamsulosin 0.8 milliGRAM(s) Oral at bedtime  vancomycin    Solution 125 milliGRAM(s) Oral every 12 hours    MEDICATIONS  (PRN):  acetaminophen     Tablet .. 650 milliGRAM(s) Oral every 6 hours PRN Moderate Pain (4 - 6)  gabapentin 300 milliGRAM(s) Oral daily PRN phantom pain in LLE  OLANZapine Injectable 2.5 milliGRAM(s) IntraMuscular every 6 hours PRN agitation      Vitals:  T(C): 36.1 (22 @ 05:38), Max: 36.9 (02-10-22 @ 11:36)  HR: 83 (22 @ 05:38) (70 - 113)  BP: 132/77 (22 @ 05:38) (111/56 - 136/63)  RR: 18 (22 @ 05:38) (18 - 19)  SpO2: 99% (22 @ 05:38) (97% - 99%)        Labs:                        12.6   6.40  )-----------( 194      ( 10 Feb 2022 04:30 )             41.5     WBC Count: 6.40 K/uL (02-10 @ 04:30)  WBC Count: 6.30 K/uL ( @ 06:00)  WBC Count: 6.02 K/uL ( @ 04:30)  WBC Count: 4.65 K/uL ( @ 08:33)  WBC Count: 5.66 K/uL ( @ 07:18)      02-10    144  |  108  |  30<H>  ----------------------------<  109<H>  4.8   |  19  |  1.1    Ca    10.5<H>      10 Feb 2022 04:30  Mg     2.2     02-10    TPro  7.2  /  Alb  4.2  /  TBili  0.3  /  DBili  x   /  AST  18  /  ALT  15  /  AlkPhos  148<H>  02-10    CAPILLARY BLOOD GLUCOSE      POCT Blood Glucose.: 107 mg/dL (2022 07:34)    LIVER FUNCTIONS - ( 10 Feb 2022 04:30 )  Alb: 4.2 g/dL / Pro: 7.2 g/dL / ALK PHOS: 148 U/L / ALT: 15 U/L / AST: 18 U/L / GGT: x               CSF:      Neuro  Eyes: Conjunctiva and sclera clear.  Cranial nerves: Pupils equally round and reactive to light, extraocular muscles intact, V1 through V3 intact bilaterally and symmetric, face symmetric, tongue was midline.  Motor: Moves all 3 extremities anti-gravity. L AKA. Normal tone and bulk.  No abnormal movements.    Sensation: Intact to light touch, pain, temperature and vibration.  Coordination: No dysmetria on finger-to-nose and heel-to-shin.   Reflexes: 2+ UE/LE, downgoing toes.       VEEG 24 hours: 22    Background - continuous, less than optimally organized, reaching frequencies in the range of 6-7Hz    Focal and generalized slowin. mild to moderate generalized slowing  2. Moderate bilateral right significantly more than left posterior quadrants focal slowing    Interictal activity - independent right >> left sharp waves and spike and after going slow    Events - none    Seizures - none    Impression: Abnormal VEEG as above    Plan - as per neurology team    < from: CT Brain Stroke Protocol (22 @ 12:16) >  IMPRESSION:    No acute intracranial pathology, stable exam since 2022.    Stable severe chronic microvascular ischemic changes. Recommend further   evaluation with MRI if there is continued clinical concern for   superimposed small acute infarct.    Communication: The summary of above findings were discussed with readback   confirmation with TORSTEN Ramesh by radiologist Dr. Lemus on 2022 at 12:22   PM.    --- End of Report ---      < end of copied text >  < from: CT Head No Cont (22 @ 19:40) >  Impression:    No CT evidence of acute intracranial injury.    Severe chronic microvascular ischemic changes.    No significant change since 2021    --- End of Report ---    < end of copied text >

## 2022-02-11 NOTE — CHART NOTE - NSCHARTNOTESELECT_GEN_ALL_CORE
Event Note
 PA/Event Note
Event Note
Family Communication/Event Note
Family Communication/Event Note
Neurology Follow up/Event Note

## 2022-02-11 NOTE — PROGRESS NOTE ADULT - SUBJECTIVE AND OBJECTIVE BOX
Patient is a 76y old  Female who presents with a chief complaint of Seizures (11 Feb 2022 09:36)      Patient seen and examined at bedside.    ALLERGIES:  adhesives (Unknown)  valproic acid (Unknown)    MEDICATIONS:  acetaminophen     Tablet .. 650 milliGRAM(s) Oral every 6 hours PRN  aspirin  chewable 81 milliGRAM(s) Oral daily  atorvastatin 40 milliGRAM(s) Oral at bedtime  cefTRIAXone   IVPB 2000 milliGRAM(s) IV Intermittent every 24 hours  chlorhexidine 2% Cloths 1 Application(s) Topical <User Schedule>  gabapentin 300 milliGRAM(s) Oral daily PRN  haloperidol    Injectable 1 milliGRAM(s) IntraMuscular once  heparin   Injectable 5000 Unit(s) SubCutaneous every 12 hours  lacosamide 200 milliGRAM(s) Oral two times a day  lactobacillus acidophilus 1 Tablet(s) Oral daily  lamoTRIgine 12.5 milliGRAM(s) Oral daily  metoprolol tartrate 25 milliGRAM(s) Oral three times a day  OLANZapine Injectable 2.5 milliGRAM(s) IntraMuscular every 6 hours PRN  QUEtiapine 25 milliGRAM(s) Oral <User Schedule>  QUEtiapine 100 milliGRAM(s) Oral at bedtime  senna 2 Tablet(s) Oral at bedtime  tamsulosin 0.8 milliGRAM(s) Oral at bedtime  vancomycin    Solution 125 milliGRAM(s) Oral every 12 hours    Vital Signs Last 24 Hrs  T(F): 97.5 (11 Feb 2022 14:44), Max: 97.5 (11 Feb 2022 14:44)  HR: 115 (11 Feb 2022 14:44) (83 - 115)  BP: 138/79 (11 Feb 2022 14:44) (132/77 - 138/79)  RR: 18 (11 Feb 2022 14:44) (18 - 18)  SpO2: 99% (11 Feb 2022 14:44) (99% - 99%)  I&O's Summary    10 Feb 2022 07:01  -  11 Feb 2022 07:00  --------------------------------------------------------  IN: 120 mL / OUT: 2400 mL / NET: -2280 mL        PHYSICAL EXAM:  General: NAD, Alert  ENT: MMM  Neck: Supple, No JVD  Lungs: Clear to auscultation bilaterally, no crackles   Cardio: RRR, S1/S2, No murmurs  Abdomen: Soft, Nontender, Nondistended; Bowel sounds present  Extremities: No cyanosis, No edema    LABS:                        12.6   6.40  )-----------( 194      ( 10 Feb 2022 04:30 )             41.5     02-10    144  |  108  |  30  ----------------------------<  109  4.8   |  19  |  1.1    Ca    10.5      10 Feb 2022 04:30  Mg     2.2     02-10    TPro  7.2  /  Alb  4.2  /  TBili  0.3  /  DBili  x   /  AST  18  /  ALT  15  /  AlkPhos  148  02-10    eGFR if Non African American: 48 mL/min/1.73M2 (02-10-22 @ 04:30)  eGFR if African American: 56 mL/min/1.73M2 (02-10-22 @ 04:30)            11-26 Chol 111 mg/dL LDL -- HDL 47 mg/dL Trig 92 mg/dL              POCT Blood Glucose.: 110 mg/dL (11 Feb 2022 11:28)  POCT Blood Glucose.: 107 mg/dL (11 Feb 2022 07:34)          COVID-19 PCR: Detected (02-09-22 @ 08:51)  COVID-19 PCR: NotDetec (02-02-22 @ 12:20)  COVID-19 PCR: Detected (01-18-22 @ 21:31)      RADIOLOGY & ADDITIONAL TESTS:    Care Discussed with Consultants/Other Providers:

## 2022-02-11 NOTE — DISCHARGE NOTE PROVIDER - DETAILS OF MALNUTRITION DIAGNOSIS/DIAGNOSES
This patient has been assessed with a concern for Malnutrition and was treated during this hospitalization for the following Nutrition diagnosis/diagnoses:     -  02/05/2022: Moderate protein-calorie malnutrition

## 2022-02-11 NOTE — DISCHARGE NOTE PROVIDER - PROVIDER TOKENS
PROVIDER:[TOKEN:[[id number]],FOLLOWUP:[2 weeks],ESTABLISHEDPATIENT:[T]],PROVIDER:[TOKEN:[[id number]],FOLLOWUP:[2 weeks]],PROVIDER:[TOKEN:[[id number]],FOLLOWUP:[2 weeks]]

## 2022-02-17 NOTE — ED PROVIDER NOTE - PATIENT PORTAL LINK FT
You can access the FollowMyHealth Patient Portal offered by [facility] by registering at the following website: [email or web address]. By joining [facility]’s FollowMyHealth portal, you will also be able to view your health information using other applications (apps) compatible with our system.

## 2022-02-17 NOTE — CONSULT NOTE ADULT - ASSESSMENT
Impression:  75 yo female, PMHx of Alzheimer's dementia, 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 Per patient's daughter, patient seems to have been taking the wrong dose of her Vimpat. Per the daughter, patient should be taking 150 mg Vimpat but has been getting 75 mg. Patient had 2 seizures back to back without return to baseline and EMS gave versed en route. Patient back to baseline daughter wants to take patient home.     Suggestion:  Discussed with Dr Nguyen  Can discharge patient with outpatient follow up  Continue Vimpat at current dose.  Impression:  75 yo female, PMHx of Alzheimer's dementia, 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 Per patient's daughter, patient seems to have been taking the wrong dose of her Vimpat. Per the daughter, patient should be taking 150 mg Vimpat but has been getting 75 mg. Patient had 2 seizures back to back without return to baseline and EMS gave versed en route. Patient back to baseline daughter wants to take patient home.     Suggestion:  Discussed with Dr Nguyen  Can discharge patient with outpatient follow up  Continue current seizure management.

## 2022-02-17 NOTE — ED PROVIDER NOTE - NSFOLLOWUPINSTRUCTIONS_ED_ALL_ED_FT
FOLLOW UP WITH THE NEUROLOGIST AS INSTRUCTED.    Seizure    A seizure is abnormal electrical activity in the brain; the specific cause may or may not be found. Prior to a seizure you may experience a warning sensation (aura) that may include fear, nausea, dizziness, and visual changes such as flashing lights of spots. Common symptoms during the seizure may include an altered mental status, rhythmic jerking movements, drooling, grunting, loss of bladder or bowel control, or tongue biting. After a seizure, you may feel confused and sleepy.     Do not swim, drive, operate machinery, or engage in any risky activity during which a seizure could cause further injury to you or others. Teach friends and family what to do if you HAVE a seizure which includes laying you on the ground with your head on a cushion and turning you to the side to keep your breathing passages clear in case of vomiting.    SEEK IMMEDIATE MEDICAL CARE IF YOU HAVE ANY OF THE FOLLOWING SYMPTOMS: seizure lasting over 5 minutes, not waking up or persistent altered mental status after the seizure, or more frequent or worsening seizures.

## 2022-02-17 NOTE — CONSULT NOTE ADULT - SUBJECTIVE AND OBJECTIVE BOX
Neurology Consult    Patient is a 76y old  Female who presents with a chief complaint of seizure    HPI:  77 yo female, PMHx of Alzheimer's dementia, 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 Per patient's daughter, patient seems to have been taking the wrong dose of her Vimpat. Per the daughter, patient should be taking 150 mg Vimpat but has been getting 75 mg. Patient had 2 seizures back to back without return to baseline and EMS gave versed en route.    PAST MEDICAL & SURGICAL HISTORY:  Hypertension    Dementia    Chronic GERD    History of TIA (transient ischemic attack)    Seizure disorder    Folate deficiency    Vitamin B12 deficiency    S/P AKA (above knee amputation), left    H/O left nephrectomy        FAMILY HISTORY:  FH: allergy        Social History: (-) x 3    Allergies    adhesives (Unknown)  valproic acid (Unknown)    Intolerances        MEDICATIONS  (STANDING):    MEDICATIONS  (PRN):      Vital Signs Last 24 Hrs  T(C): 37.1 (2022 13:50), Max: 37.1 (2022 13:50)  T(F): 98.7 (2022 13:50), Max: 98.7 (2022 13:50)  HR: 103 (2022 13:50) (103 - 103)  BP: 122/66 (2022 13:50) (122/66 - 122/66)  BP(mean): --  RR: 18 (2022 13:50) (18 - 18)  SpO2: 100% (2022 13:50) (100% - 100%)    Examination:  Awake alert oriented to self and place as per baseline  No gaze no acute visual deficit  No facial asymmetry  Moves bilateral UE LE without gross deficit( LAKA)  No sensory deficit  Neglect and cerebellar exams limited by mental status.     Labs:   CBC Full  -  ( 2022 14:00 )  WBC Count : 7.19 K/uL  RBC Count : 4.00 M/uL  Hemoglobin : 12.1 g/dL  Hematocrit : 38.4 %  Platelet Count - Automated : 327 K/uL  Mean Cell Volume : 96.0 fL  Mean Cell Hemoglobin : 30.3 pg  Mean Cell Hemoglobin Concentration : 31.5 g/dL  Auto Neutrophil # : 6.35 K/uL  Auto Lymphocyte # : 0.45 K/uL  Auto Monocyte # : 0.30 K/uL  Auto Eosinophil # : 0.04 K/uL  Auto Basophil # : 0.04 K/uL  Auto Neutrophil % : 88.2 %  Auto Lymphocyte % : 6.3 %  Auto Monocyte % : 4.2 %  Auto Eosinophil % : 0.6 %  Auto Basophil % : 0.6 %        143  |  109  |  11  ----------------------------<  115<H>  3.7   |  22  |  0.8    Ca    10.1      2022 14:00    TPro  6.6  /  Alb  3.9  /  TBili  0.3  /  DBili  x   /  AST  14  /  ALT  13  /  AlkPhos  155<H>      LIVER FUNCTIONS - ( 2022 14:00 )  Alb: 3.9 g/dL / Pro: 6.6 g/dL / ALK PHOS: 155 U/L / ALT: 13 U/L / AST: 14 U/L / GGT: x             Urinalysis Basic - ( 2022 17:03 )    Color: Light Yellow / Appearance: Slightly Turbid / S.009 / pH: x  Gluc: x / Ketone: Negative  / Bili: Negative / Urobili: <2 mg/dL   Blood: x / Protein: 30 mg/dL / Nitrite: Positive   Leuk Esterase: Large / RBC: 3 /HPF /  /HPF   Sq Epi: x / Non Sq Epi: 0 /HPF / Bacteria: Negative

## 2022-02-17 NOTE — ED PROVIDER NOTE - OBJECTIVE STATEMENT
77 yo female, PMHx of Alzheimer's dementia, 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 Per patient's daughter, patient seems to have been taking the wrong dose of her Vimpat. Per the daughter, patient should be taking 150 mg Vimpat but has been getting 75 mg. Patient had 2 seizures back to back without return to baseline and EMS gave versed en route. Witnessed by daughter and denies head trauma.

## 2022-02-17 NOTE — ED PROVIDER NOTE - PR
Pt called stated she needs to get the US order to take to [address] on [facility] in [place] pt would like the order faxed over there. They need to know what she is having this done. 172

## 2022-02-17 NOTE — ED PROVIDER NOTE - CARE PROVIDER_API CALL
Jose David Nguyen)  Neurology  [address] [place] [address]  Phone: [phone]  Fax: [phone]  Follow Up Time: 1-3 Days

## 2022-02-17 NOTE — ED PROVIDER NOTE - NSCAREINITIATED _GEN_ER
Pt refusing pelvic exam and Vaginal US.   \"im just here to make sure the baby is ok.\"   Ai Alvarez(Resident)

## 2022-02-17 NOTE — ED PROVIDER NOTE - CLINICAL SUMMARY MEDICAL DECISION MAKING FREE TEXT BOX
76-year-old female presents to the ED for seizure-like episode.  Patient was recently readjusted on her Vimpat dosage and was accidentally underdosed by her family.  Patient initially was brought in by EMS who noticed 2 episodes of seizures and was given Versed in route.  Patient was signed out to me pending evaluation by neurology and final disposition.  Initial team evaluated the patient and was placed on seizure precautions.  Physical exam unremarkable.  Labs reviewed by me noted to have nitrites and leukocyte esterase which was treated by the initial team for UTI.  Vimpat redosed along with Keppra..  Evaluated by neurology at bedside with recommendations for continued dosing with the appropriate 150 mg of Vimpat.  Discharged home with return precautions given.  Patient's family at bedside who understood plan.  Discharged home.

## 2022-02-17 NOTE — ED PROVIDER NOTE - ATTENDING CONTRIBUTION TO CARE
76-year-old female with a past medical history of seizures on Vimpat brought in by ambulance for seizures.  Patient had 2 episodes of seizures was given Versed by EMS.  Further history later obtained by patient's daughter who states accidentally has been giving half the dose of Vimpat as she was recently admitted and it was increased.  Daughter states son gave the wrong bottle and she is only been giving half dose of what she was supposed to be getting of  Vimpat no fevers no chills nausea vomiting chest pain shortness of breath abdominal pain or falls.  on exam  CONSTITUTIONAL: WA / WN / NAD  HEAD: NCAT  EYES: PERRL; EOMI;   ENT: Normal pharynx  NECK: Supple; no meningeal signs  CARD: RRR; nl S1/S2; no M/R/G.   RESP: Respiratory rate and effort are normal; breath sounds clear and equal bilaterally.  ABD: Soft, NT ND   MSK/EXT: No gross deformities; full range of motion.  SKIN: Warm and dry;   NEURO: AAOx3, Motor 5/5 x 4 extremities  PSYCH: , Normal Affect 76-year-old female with a past medical history of seizures on Vimpat brought in by ambulance for seizures.  Patient had 2 episodes of seizures was given Versed by EMS.  Further history later obtained by patient's daughter who states accidentally has been giving half the dose of Vimpat as she was recently admitted and it was increased.  Daughter states son gave the wrong bottle and she is only been giving half dose of what she was supposed to be getting of  Vimpat no fevers no chills nausea vomiting chest pain shortness of breath abdominal pain or falls.  on exam  CONSTITUTIONAL: WA / WN / NAD  HEAD: NCAT  EYES: PERRL; EOMI;   ENT: Normal pharynx  NECK: Supple; no meningeal signs  CARD: RRR; nl S1/S2; no M/R/G.   RESP: Respiratory rate and effort are normal; breath sounds clear and equal bilaterally.  ABD: Soft, NT ND   MSK/EXT: No gross deformities + left aka  SKIN: Warm and dry;   NEURO: AAOx3, Motor 5/5 x 4 extremities  PSYCH: , Normal Affect

## 2022-02-17 NOTE — ED PROVIDER NOTE - PHYSICAL EXAMINATION
CONSTITUTIONAL: Well-developed; well-nourished  SKIN: warm, dry  HEAD: Normocephalic; atraumatic.  EYES: no conjunctival injection  ENT: No nasal discharge; airway clear.  NECK: Supple; non tender.  CARD: S1, S2 normal; Regular rate and rhythm.   RESP: No wheezes, rales or rhonchi.  ABD: soft ntnd  EXT: Normal ROM.  No clubbing, cyanosis or edema.   NEURO: drowsy, opens eyes to pain

## 2022-02-17 NOTE — ED PROVIDER NOTE - PROGRESS NOTE DETAILS
SR: -s/o provided to dr. south pending neuro Jose: received signout from Dr. Alvarez and Dr. Saleh. Pt was cleared by neurology for outpatient follow up.  Full DC instructions discussed and patient knows when to seek immediate medical attention.  Patient has proper follow up.  All results discussed and patient aware they may require further work up.  Proper follow up ensured. Limitations of ED work up discussed.  Medications administered and prescribed/OTC home meds discussed.  All questions and concerns from patient or family addressed. Understanding of instructions verbalized.

## 2022-02-22 NOTE — ED POST DISCHARGE NOTE - DETAILS
SPOKE WITH SON, BHAVANI. HE WILL CALL PMD AND GIVE THEM LAB TELE TO CALL FOR REPORT. PATIENT JUST FINISHED COURSE OF CEFPODOXIME, BUT UCX WAS TAKEN IN MIDDLE OF TREATMENT. ADVISED F/U WITH PMD WITHIN 24 HRS

## 2022-02-22 NOTE — ED POST DISCHARGE NOTE - RESULT SUMMARY
+ UCX- PSEUDOMONAS AERG. NOTED. + UCX- PSEUDOMONAS AERG. NOTED. RX SENT FOR LEVAQUIN 500 MG QD 7 DAYS.

## 2022-06-23 ENCOUNTER — APPOINTMENT (OUTPATIENT)
Dept: NEUROLOGY | Facility: CLINIC | Age: 77
End: 2022-06-23

## 2022-09-22 NOTE — PATIENT PROFILE ADULT - CAREGIVER NAME
Patient admitted to  via [facility] from [facility] with acute liver failure. Dr. Cota bedside.     Past Medical History:   Diagnosis Date   • Bell's palsy    • Diabetes mellitus (CMS/HCC)    • HTN (hypertension)         Kishore tarango

## 2022-11-11 NOTE — PROGRESS NOTE ADULT - REASON FOR ADMISSION
Mechanical fall, UTI w/o hematuria, Dementia Name: Carey Hines [facility]  : 1965  MRN: [id number]    Oncology Navigation Follow-Up Note    Contact Type:  Telephone    Notes: Spoke with pt for f/u call. Pt denied questions/concerns. Instructed pt may contact writer prn. Will continue to follow.     Electronically signed by Lindsay Chatterjee RN on 2022 at 12:51 PM

## 2022-12-19 ENCOUNTER — APPOINTMENT (OUTPATIENT)
Dept: NEUROLOGY | Facility: CLINIC | Age: 77
End: 2022-12-19

## 2023-03-16 NOTE — CHART NOTE - NSCHARTNOTEFT_GEN_A_CORE
Calorie Count Result    Kcal count form reviewed. Documentation with no specifications of amounts of each item consumed, but a general % tray eaten. Numbers below are rough estimation.     Day 1  750kcal, 30g protein  Day 2  1000kcal, 46g protein  Day 3  1125kcal, 45g protein    Nutrition care deferred to NST.   RD spectra [phone] with questions about this kcal count. Please triage the following appoitment made through [facility].      Appointment For: Shady Dunn ([id number])   Visit Type: PC EXTENDED FOLLOW-UP ([id number])      4/11/2023    2:00 PM  40 mins.  Yessenia Tello MD [facility] INTERNAL MEDICINE      Patient Comments:   Medication update,std/hiv testing,blood in puke

## 2023-06-07 NOTE — PATIENT PROFILE ADULT - FUNCTIONAL ASSESSMENT - DAILY ACTIVITY SCORE.
18 Price (Do Not Change): 0.00 Instructions: This plan will send the code FBSD to the PM system.  DO NOT or CHANGE the price. Detail Level: Simple

## 2023-08-03 NOTE — ED ADULT NURSE NOTE - HOW OFTEN DO YOU HAVE A DRINK CONTAINING ALCOHOL?
Never Itraconazole Counseling:  I discussed with the patient the risks of itraconazole including but not limited to liver damage, nausea/vomiting, neuropathy, and severe allergy.  The patient understands that this medication is best absorbed when taken with acidic beverages such as non-diet cola or ginger ale.  The patient understands that monitoring is required including baseline LFTs and repeat LFTs at intervals.  The patient understands that they are to contact us or the primary physician if concerning signs are noted.

## 2024-04-05 NOTE — BEHAVIORAL HEALTH ASSESSMENT NOTE - NSBHPTASSESSDT_PSY_A_CORE
Call pt and let her know prescription [facility] and for her to call back If its out of stock   
Caller: AlexandraClementinaManhattan Ruth    Relationship: Self    Best call back number: [phone]     Requested Prescriptions:   Requested Prescriptions     Pending Prescriptions Disp Refills    Dulaglutide (Trulicity) 1.5 MG/0.5ML solution pen-injector       Sig: Inject 3 mg under the skin into the appropriate area as directed 1 (One) Time Per Week.        Pharmacy where request should be sent: [facility]/PHARMACY #[id number] - [address] [phone] [place] [phone] FX     Last office visit with prescribing clinician: 3/22/2024   Last telemedicine visit with prescribing clinician: Visit date not found   Next office visit with prescribing clinician: 5/3/2024     Additional details provided by patient: PATIENT STATED THAT HER DOSAGE WAS CHANGED TO 3 MG AND SHE IS DUE HER NEXT INJECTION ON 04/06/24. SHE DOES NOT HAVE ENOUGH FOR A FULL DOSE.    Does the patient have less than a 3 day supply:  [x] Yes  [] No    Would you like a call back once the refill request has been completed: [x] Yes [] No    If the office needs to give you a call back, can they leave a voicemail: [x] Yes [] No    Aby Reed Rep   04/04/24 15:45 EDT         
28-Jun-2021 17:30

## 2024-05-31 NOTE — CHART NOTE - NSCHARTNOTESELECT_GEN_ALL_CORE
Event Note
Event Note
Message from patient: Patient has a foster child with her that was just born on the 27th of May at [facility], she is asking if you would see the child today. Please call mother.     RN spoke provider and new patient can be see on 6-3-24 at 1030 or 1500.  RN called Ted foster mom and she said she can come at 1500 on Monday 6-3-24. Appointment made for 6-3-24 @ 1500 for Ghada Randolph.          
Event Note

## 2024-06-24 NOTE — ED PROVIDER NOTE - PROGRESS NOTE
Dear Sayda,     Your recent ultrasound showed a reactive lymph node ( which means a normal swollen lymph node) and we will plan to continue to monitor this. As the radiologist recommends, if this area changes or enlarges we can plan repeat ultrasound at that time.        Thank you,     Brittany Miles, CHE, CNP  [facility]        
Stable.

## 2024-08-01 NOTE — DISCHARGE NOTE PROVIDER - NSDCQMAMI_CARD_ALL_CORE
Department of Anesthesiology  Postprocedure Note    Patient: Staci Cornejo  MRN: [id number]  YOB: 1952  Date of evaluation: 8/1/2024    Procedure Summary       Date: 08/01/24 Room / Location: [place] [address] / [facility]    Anesthesia Start: 1000 Anesthesia Stop: 1058    Procedure: ENTEROSCOPY WITH ARGON PLASMA COAUGULATION Diagnosis:       Iron deficiency anemia, unspecified iron deficiency anemia type      (Iron deficiency anemia, unspecified iron deficiency anemia type [D50.9])    Surgeons: Orlando Castro MD Responsible Provider: Casey Palacio MD    Anesthesia Type: MAC ASA Status: 2            Anesthesia Type: No value filed.    Bridgett Phase I: Bridgett Score: 10    Bridgett Phase II: Bridgett Score: 10    Anesthesia Post Evaluation    Patient location during evaluation: PACU  Patient participation: complete - patient participated  Level of consciousness: awake  Pain score: 0  Airway patency: patent  Nausea & Vomiting: no nausea  Cardiovascular status: blood pressure returned to baseline  Respiratory status: acceptable  Hydration status: euvolemic  Pain management: adequate    No notable events documented.   No

## 2024-10-18 NOTE — ED ADULT NURSE NOTE - NS ED NOTE ABUSE RESPONSE YN
Please call patient- Mild degeneration noted in the lumbar spine, otherwise xray was not showing any acute findings. 
Yes
